# Patient Record
Sex: FEMALE | Race: ASIAN | NOT HISPANIC OR LATINO
[De-identification: names, ages, dates, MRNs, and addresses within clinical notes are randomized per-mention and may not be internally consistent; named-entity substitution may affect disease eponyms.]

---

## 2017-02-15 ENCOUNTER — RESULT REVIEW (OUTPATIENT)
Age: 72
End: 2017-02-15

## 2018-12-26 ENCOUNTER — APPOINTMENT (OUTPATIENT)
Dept: PULMONOLOGY | Facility: CLINIC | Age: 73
End: 2018-12-26
Payer: MEDICARE

## 2018-12-26 VITALS
OXYGEN SATURATION: 99 % | HEIGHT: 59 IN | TEMPERATURE: 98 F | RESPIRATION RATE: 20 BRPM | WEIGHT: 252 LBS | BODY MASS INDEX: 50.8 KG/M2 | HEART RATE: 111 BPM | DIASTOLIC BLOOD PRESSURE: 82 MMHG | SYSTOLIC BLOOD PRESSURE: 164 MMHG

## 2018-12-26 VITALS — WEIGHT: 250 LBS | BODY MASS INDEX: 50.49 KG/M2

## 2018-12-26 DIAGNOSIS — E66.2 MORBID (SEVERE) OBESITY WITH ALVEOLAR HYPOVENTILATION: ICD-10-CM

## 2018-12-26 DIAGNOSIS — M06.30 RHEUMATOID NODULE, UNSPECIFIED SITE: ICD-10-CM

## 2018-12-26 PROCEDURE — ZZZZZ: CPT

## 2018-12-26 PROCEDURE — 94729 DIFFUSING CAPACITY: CPT

## 2018-12-26 PROCEDURE — 94726 PLETHYSMOGRAPHY LUNG VOLUMES: CPT

## 2018-12-26 PROCEDURE — 94010 BREATHING CAPACITY TEST: CPT

## 2018-12-26 PROCEDURE — 99214 OFFICE O/P EST MOD 30 MIN: CPT | Mod: 25

## 2018-12-27 ENCOUNTER — OTHER (OUTPATIENT)
Age: 73
End: 2018-12-27

## 2018-12-27 DIAGNOSIS — G47.19 OTHER HYPERSOMNIA: ICD-10-CM

## 2019-01-25 ENCOUNTER — APPOINTMENT (OUTPATIENT)
Dept: SLEEP CENTER | Facility: CLINIC | Age: 74
End: 2019-01-25
Payer: MEDICARE

## 2019-01-25 PROCEDURE — G0399: CPT | Mod: 26

## 2019-01-28 ENCOUNTER — OUTPATIENT (OUTPATIENT)
Dept: OUTPATIENT SERVICES | Facility: HOSPITAL | Age: 74
LOS: 1 days | End: 2019-01-28
Payer: MEDICARE

## 2019-01-28 PROCEDURE — G0399: CPT

## 2019-02-01 DIAGNOSIS — G47.33 OBSTRUCTIVE SLEEP APNEA (ADULT) (PEDIATRIC): ICD-10-CM

## 2019-03-15 ENCOUNTER — APPOINTMENT (OUTPATIENT)
Dept: PULMONOLOGY | Facility: CLINIC | Age: 74
End: 2019-03-15
Payer: MEDICARE

## 2019-03-15 VITALS
HEIGHT: 59 IN | HEART RATE: 95 BPM | TEMPERATURE: 98 F | SYSTOLIC BLOOD PRESSURE: 160 MMHG | DIASTOLIC BLOOD PRESSURE: 81 MMHG | BODY MASS INDEX: 50.4 KG/M2 | OXYGEN SATURATION: 95 % | WEIGHT: 250 LBS | RESPIRATION RATE: 16 BRPM

## 2019-03-15 DIAGNOSIS — J47.9 BRONCHIECTASIS, UNCOMPLICATED: ICD-10-CM

## 2019-03-15 DIAGNOSIS — I50.30 UNSPECIFIED DIASTOLIC (CONGESTIVE) HEART FAILURE: ICD-10-CM

## 2019-03-15 DIAGNOSIS — Z86.79 PERSONAL HISTORY OF OTHER DISEASES OF THE CIRCULATORY SYSTEM: ICD-10-CM

## 2019-03-15 DIAGNOSIS — Z86.39 PERSONAL HISTORY OF OTHER ENDOCRINE, NUTRITIONAL AND METABOLIC DISEASE: ICD-10-CM

## 2019-03-15 DIAGNOSIS — M06.9 RHEUMATOID ARTHRITIS, UNSPECIFIED: ICD-10-CM

## 2019-03-15 PROCEDURE — 99215 OFFICE O/P EST HI 40 MIN: CPT

## 2019-03-15 RX ORDER — HYDRALAZINE HYDROCHLORIDE 50 MG/1
TABLET ORAL
Refills: 0 | Status: ACTIVE | COMMUNITY

## 2019-03-15 RX ORDER — BENAZEPRIL HYDROCHLORIDE 10 MG/1
10 TABLET ORAL
Refills: 0 | Status: ACTIVE | COMMUNITY

## 2019-03-15 NOTE — ASSESSMENT
[FreeTextEntry1] : Based on patient examination and her chest x-ray findings, it seems likely that she has diastolic heart failure. My plan is to add Aldactone to her regimen. I asked her to weigh herself daily and she will call me in 3 or 4 days to be certain that she tolerates the Aldactone. She did not have any significant effect, I would like we added metolazone to her regimen while monitoring her kidney function. I discussed this with her in detail. I reviewed the echo report from her cardiologist's office.

## 2019-03-15 NOTE — REVIEW OF SYSTEMS
[Fatigue] : fatigue [Cough] : cough [Dyspnea] : dyspnea [As Noted in HPI] : as noted in HPI [Hypertension] : ~T hypertension [Arthralgias] : arthralgias [Diabetes] : diabetes mellitus [Negative] : Neurologic

## 2019-03-15 NOTE — PHYSICAL EXAM
[General Appearance - In No Acute Distress] : no acute distress [Normal Conjunctiva] : the conjunctiva exhibited no abnormalities [Neck Appearance] : the appearance of the neck was normal [Heart Rate And Rhythm] : heart rate and rhythm were normal [Heart Sounds] : normal S1 and S2 [] : no respiratory distress [Respiration, Rhythm And Depth] : normal respiratory rhythm and effort [FreeTextEntry1] : wheelchair [Nail Clubbing] : no clubbing of the fingernails [Cyanosis, Localized] : no localized cyanosis [FreeTextEntry2] : +4 [No Focal Deficits] : no focal deficits [Affect] : the affect was normal [Mood] : the mood was normal

## 2019-03-15 NOTE — HISTORY OF PRESENT ILLNESS
[FreeTextEntry1] : 73-year-old female with increasing dyspnea including paroxysmal nocturnal dyspnea. The patient is complaining of difficulty breathing while sleeping with choking episodes. Her sleep study failed to disclose significant apnea. However, a recent chest radiograph demonstrated bilateral pleural effusions, perihilar infiltrates and cardiomegaly consistent with congestive heart failure. Her cardiologist started her on Cipro. The patient currently is on several antihypertensives and 80 mg of Lasix a day. She admits to a nonproductive cough and fatigue as well as dyspnea but denies any chest pain or fever

## 2019-03-15 NOTE — REASON FOR VISIT
[Follow-Up] : a follow-up visit [Shortness of Breath] : shortness of Breath [Family Member] : family member

## 2019-04-15 ENCOUNTER — RX RENEWAL (OUTPATIENT)
Age: 74
End: 2019-04-15

## 2019-04-15 RX ORDER — SPIRONOLACTONE 25 MG/1
25 TABLET ORAL
Qty: 60 | Refills: 0 | Status: ACTIVE | COMMUNITY
Start: 2019-03-15 | End: 1900-01-01

## 2020-04-04 ENCOUNTER — INPATIENT (INPATIENT)
Facility: HOSPITAL | Age: 75
LOS: 19 days | Discharge: HOSPICE | End: 2020-04-24
Attending: INTERNAL MEDICINE | Admitting: INTERNAL MEDICINE
Payer: MEDICARE

## 2020-04-04 VITALS
HEART RATE: 94 BPM | RESPIRATION RATE: 20 BRPM | TEMPERATURE: 99 F | OXYGEN SATURATION: 99 % | SYSTOLIC BLOOD PRESSURE: 99 MMHG | DIASTOLIC BLOOD PRESSURE: 40 MMHG

## 2020-04-04 DIAGNOSIS — E87.5 HYPERKALEMIA: ICD-10-CM

## 2020-04-04 LAB
ALBUMIN SERPL ELPH-MCNC: 2.3 G/DL — LOW (ref 3.3–5)
ALBUMIN SERPL ELPH-MCNC: 2.4 G/DL — LOW (ref 3.3–5)
ALP SERPL-CCNC: 187 U/L — HIGH (ref 40–120)
ALP SERPL-CCNC: 201 U/L — HIGH (ref 40–120)
ALT FLD-CCNC: 27 U/L — SIGNIFICANT CHANGE UP (ref 4–33)
ALT FLD-CCNC: 28 U/L — SIGNIFICANT CHANGE UP (ref 4–33)
ANION GAP SERPL CALC-SCNC: 14 MMO/L — SIGNIFICANT CHANGE UP (ref 7–14)
ANION GAP SERPL CALC-SCNC: 15 MMO/L — HIGH (ref 7–14)
ANION GAP SERPL CALC-SCNC: 17 MMO/L — HIGH (ref 7–14)
ANISOCYTOSIS BLD QL: SLIGHT — SIGNIFICANT CHANGE UP
APPEARANCE UR: CLEAR — SIGNIFICANT CHANGE UP
APTT BLD: 24.2 SEC — LOW (ref 27.5–36.3)
AST SERPL-CCNC: 20 U/L — SIGNIFICANT CHANGE UP (ref 4–32)
AST SERPL-CCNC: 22 U/L — SIGNIFICANT CHANGE UP (ref 4–32)
BACTERIA # UR AUTO: HIGH
BASOPHILS # BLD AUTO: 0.04 K/UL — SIGNIFICANT CHANGE UP (ref 0–0.2)
BASOPHILS NFR BLD AUTO: 0.2 % — SIGNIFICANT CHANGE UP (ref 0–2)
BASOPHILS NFR SPEC: 0 % — SIGNIFICANT CHANGE UP (ref 0–2)
BILIRUB SERPL-MCNC: 0.3 MG/DL — SIGNIFICANT CHANGE UP (ref 0.2–1.2)
BILIRUB SERPL-MCNC: 0.4 MG/DL — SIGNIFICANT CHANGE UP (ref 0.2–1.2)
BILIRUB UR-MCNC: NEGATIVE — SIGNIFICANT CHANGE UP
BLASTS # FLD: 0 % — SIGNIFICANT CHANGE UP (ref 0–0)
BLOOD UR QL VISUAL: NEGATIVE — SIGNIFICANT CHANGE UP
BUN SERPL-MCNC: 107 MG/DL — HIGH (ref 7–23)
BUN SERPL-MCNC: 110 MG/DL — HIGH (ref 7–23)
BUN SERPL-MCNC: 110 MG/DL — HIGH (ref 7–23)
CALCIUM SERPL-MCNC: 9.2 MG/DL — SIGNIFICANT CHANGE UP (ref 8.4–10.5)
CALCIUM SERPL-MCNC: 9.4 MG/DL — SIGNIFICANT CHANGE UP (ref 8.4–10.5)
CALCIUM SERPL-MCNC: 9.5 MG/DL — SIGNIFICANT CHANGE UP (ref 8.4–10.5)
CHLORIDE SERPL-SCNC: 100 MMOL/L — SIGNIFICANT CHANGE UP (ref 98–107)
CHLORIDE SERPL-SCNC: 100 MMOL/L — SIGNIFICANT CHANGE UP (ref 98–107)
CHLORIDE SERPL-SCNC: 101 MMOL/L — SIGNIFICANT CHANGE UP (ref 98–107)
CO2 SERPL-SCNC: 19 MMOL/L — LOW (ref 22–31)
CO2 SERPL-SCNC: 19 MMOL/L — LOW (ref 22–31)
CO2 SERPL-SCNC: 20 MMOL/L — LOW (ref 22–31)
COLOR SPEC: YELLOW — SIGNIFICANT CHANGE UP
CREAT SERPL-MCNC: 2.82 MG/DL — HIGH (ref 0.5–1.3)
CREAT SERPL-MCNC: 2.97 MG/DL — HIGH (ref 0.5–1.3)
CREAT SERPL-MCNC: 3.15 MG/DL — HIGH (ref 0.5–1.3)
EOSINOPHIL # BLD AUTO: 0.04 K/UL — SIGNIFICANT CHANGE UP (ref 0–0.5)
EOSINOPHIL NFR BLD AUTO: 0.2 % — SIGNIFICANT CHANGE UP (ref 0–6)
EOSINOPHIL NFR FLD: 0 % — SIGNIFICANT CHANGE UP (ref 0–6)
EPI CELLS # UR: SIGNIFICANT CHANGE UP
GLUCOSE SERPL-MCNC: 224 MG/DL — HIGH (ref 70–99)
GLUCOSE SERPL-MCNC: 300 MG/DL — HIGH (ref 70–99)
GLUCOSE SERPL-MCNC: 413 MG/DL — HIGH (ref 70–99)
GLUCOSE UR-MCNC: NEGATIVE — SIGNIFICANT CHANGE UP
HCT VFR BLD CALC: 27.1 % — LOW (ref 34.5–45)
HCT VFR BLD CALC: 27.9 % — LOW (ref 34.5–45)
HGB BLD-MCNC: 7.9 G/DL — LOW (ref 11.5–15.5)
HGB BLD-MCNC: 8 G/DL — LOW (ref 11.5–15.5)
HYPOCHROMIA BLD QL: SIGNIFICANT CHANGE UP
IMM GRANULOCYTES NFR BLD AUTO: 7.3 % — HIGH (ref 0–1.5)
INR BLD: 1.24 — HIGH (ref 0.88–1.17)
KETONES UR-MCNC: NEGATIVE — SIGNIFICANT CHANGE UP
LEUKOCYTE ESTERASE UR-ACNC: SIGNIFICANT CHANGE UP
LYMPHOCYTES # BLD AUTO: 0.2 K/UL — LOW (ref 1–3.3)
LYMPHOCYTES # BLD AUTO: 1.2 % — LOW (ref 13–44)
LYMPHOCYTES NFR SPEC AUTO: 0.9 % — LOW (ref 13–44)
MACROCYTES BLD QL: SLIGHT — SIGNIFICANT CHANGE UP
MANUAL SMEAR VERIFICATION: SIGNIFICANT CHANGE UP
MCHC RBC-ENTMCNC: 27.6 PG — SIGNIFICANT CHANGE UP (ref 27–34)
MCHC RBC-ENTMCNC: 28 PG — SIGNIFICANT CHANGE UP (ref 27–34)
MCHC RBC-ENTMCNC: 28.7 % — LOW (ref 32–36)
MCHC RBC-ENTMCNC: 29.2 % — LOW (ref 32–36)
MCV RBC AUTO: 94.8 FL — SIGNIFICANT CHANGE UP (ref 80–100)
MCV RBC AUTO: 97.6 FL — SIGNIFICANT CHANGE UP (ref 80–100)
METAMYELOCYTES # FLD: 1.7 % — HIGH (ref 0–1)
MONOCYTES # BLD AUTO: 0.51 K/UL — SIGNIFICANT CHANGE UP (ref 0–0.9)
MONOCYTES NFR BLD AUTO: 3.1 % — SIGNIFICANT CHANGE UP (ref 2–14)
MONOCYTES NFR BLD: 4.4 % — SIGNIFICANT CHANGE UP (ref 2–9)
MYELOCYTES NFR BLD: 0.9 % — HIGH (ref 0–0)
NEUTROPHIL AB SER-ACNC: 87.8 % — HIGH (ref 43–77)
NEUTROPHILS # BLD AUTO: 14.61 K/UL — HIGH (ref 1.8–7.4)
NEUTROPHILS NFR BLD AUTO: 88 % — HIGH (ref 43–77)
NEUTS BAND # BLD: 4.3 % — SIGNIFICANT CHANGE UP (ref 0–6)
NITRITE UR-MCNC: NEGATIVE — SIGNIFICANT CHANGE UP
NRBC # FLD: 0.03 K/UL — SIGNIFICANT CHANGE UP (ref 0–0)
NRBC # FLD: 0.06 K/UL — SIGNIFICANT CHANGE UP (ref 0–0)
NT-PROBNP SERPL-SCNC: 2753 PG/ML — SIGNIFICANT CHANGE UP
OTHER - HEMATOLOGY %: 0 — SIGNIFICANT CHANGE UP
PH UR: 6 — SIGNIFICANT CHANGE UP (ref 5–8)
PLATELET # BLD AUTO: 142 K/UL — LOW (ref 150–400)
PLATELET # BLD AUTO: 165 K/UL — SIGNIFICANT CHANGE UP (ref 150–400)
PLATELET COUNT - ESTIMATE: NORMAL — SIGNIFICANT CHANGE UP
PMV BLD: 12.5 FL — SIGNIFICANT CHANGE UP (ref 7–13)
PMV BLD: 12.7 FL — SIGNIFICANT CHANGE UP (ref 7–13)
POLYCHROMASIA BLD QL SMEAR: SLIGHT — SIGNIFICANT CHANGE UP
POTASSIUM SERPL-MCNC: 7.2 MMOL/L — CRITICAL HIGH (ref 3.5–5.3)
POTASSIUM SERPL-MCNC: 7.5 MMOL/L — CRITICAL HIGH (ref 3.5–5.3)
POTASSIUM SERPL-MCNC: 7.5 MMOL/L — CRITICAL HIGH (ref 3.5–5.3)
POTASSIUM SERPL-SCNC: 7.2 MMOL/L — CRITICAL HIGH (ref 3.5–5.3)
POTASSIUM SERPL-SCNC: 7.5 MMOL/L — CRITICAL HIGH (ref 3.5–5.3)
POTASSIUM SERPL-SCNC: 7.5 MMOL/L — CRITICAL HIGH (ref 3.5–5.3)
PROCALCITONIN SERPL-MCNC: 4.05 NG/ML — HIGH (ref 0.02–0.1)
PROCALCITONIN SERPL-MCNC: 4.82 NG/ML — HIGH (ref 0.02–0.1)
PROMYELOCYTES # FLD: 0 % — SIGNIFICANT CHANGE UP (ref 0–0)
PROT SERPL-MCNC: 6.5 G/DL — SIGNIFICANT CHANGE UP (ref 6–8.3)
PROT SERPL-MCNC: 6.9 G/DL — SIGNIFICANT CHANGE UP (ref 6–8.3)
PROT UR-MCNC: SIGNIFICANT CHANGE UP
PROTHROM AB SERPL-ACNC: 14.3 SEC — HIGH (ref 9.8–13.1)
RBC # BLD: 2.86 M/UL — LOW (ref 3.8–5.2)
RBC # BLD: 2.86 M/UL — LOW (ref 3.8–5.2)
RBC # FLD: 15.9 % — HIGH (ref 10.3–14.5)
RBC # FLD: 15.9 % — HIGH (ref 10.3–14.5)
RBC CASTS # UR COMP ASSIST: SIGNIFICANT CHANGE UP (ref 0–?)
REVIEW TO FOLLOW: YES — SIGNIFICANT CHANGE UP
SODIUM SERPL-SCNC: 134 MMOL/L — LOW (ref 135–145)
SODIUM SERPL-SCNC: 135 MMOL/L — SIGNIFICANT CHANGE UP (ref 135–145)
SODIUM SERPL-SCNC: 136 MMOL/L — SIGNIFICANT CHANGE UP (ref 135–145)
SP GR SPEC: 1.02 — SIGNIFICANT CHANGE UP (ref 1–1.04)
UROBILINOGEN FLD QL: 0.2 — SIGNIFICANT CHANGE UP
VARIANT LYMPHS # BLD: 0 % — SIGNIFICANT CHANGE UP
WBC # BLD: 15.61 K/UL — HIGH (ref 3.8–10.5)
WBC # BLD: 16.61 K/UL — HIGH (ref 3.8–10.5)
WBC # FLD AUTO: 15.61 K/UL — HIGH (ref 3.8–10.5)
WBC # FLD AUTO: 16.61 K/UL — HIGH (ref 3.8–10.5)
WBC UR QL: SIGNIFICANT CHANGE UP (ref 0–?)

## 2020-04-04 PROCEDURE — 71045 X-RAY EXAM CHEST 1 VIEW: CPT | Mod: 26

## 2020-04-04 RX ORDER — INSULIN HUMAN 100 [IU]/ML
3 INJECTION, SOLUTION SUBCUTANEOUS ONCE
Refills: 0 | Status: COMPLETED | OUTPATIENT
Start: 2020-04-04 | End: 2020-04-04

## 2020-04-04 RX ORDER — SODIUM ZIRCONIUM CYCLOSILICATE 10 G/10G
10 POWDER, FOR SUSPENSION ORAL ONCE
Refills: 0 | Status: COMPLETED | OUTPATIENT
Start: 2020-04-04 | End: 2020-04-05

## 2020-04-04 RX ORDER — DEXTROSE 50 % IN WATER 50 %
50 SYRINGE (ML) INTRAVENOUS ONCE
Refills: 0 | Status: COMPLETED | OUTPATIENT
Start: 2020-04-04 | End: 2020-04-04

## 2020-04-04 RX ORDER — SODIUM BICARBONATE 1 MEQ/ML
50 SYRINGE (ML) INTRAVENOUS ONCE
Refills: 0 | Status: COMPLETED | OUTPATIENT
Start: 2020-04-04 | End: 2020-04-04

## 2020-04-04 RX ORDER — ACETAMINOPHEN 500 MG
650 TABLET ORAL ONCE
Refills: 0 | Status: COMPLETED | OUTPATIENT
Start: 2020-04-04 | End: 2020-04-04

## 2020-04-04 RX ORDER — CALCIUM GLUCONATE 100 MG/ML
1 VIAL (ML) INTRAVENOUS ONCE
Refills: 0 | Status: COMPLETED | OUTPATIENT
Start: 2020-04-04 | End: 2020-04-04

## 2020-04-04 RX ORDER — CALCIUM GLUCONATE 100 MG/ML
2 VIAL (ML) INTRAVENOUS ONCE
Refills: 0 | Status: COMPLETED | OUTPATIENT
Start: 2020-04-04 | End: 2020-04-04

## 2020-04-04 RX ORDER — INSULIN HUMAN 100 [IU]/ML
7 INJECTION, SOLUTION SUBCUTANEOUS ONCE
Refills: 0 | Status: COMPLETED | OUTPATIENT
Start: 2020-04-04 | End: 2020-04-04

## 2020-04-04 RX ORDER — FUROSEMIDE 40 MG
40 TABLET ORAL ONCE
Refills: 0 | Status: COMPLETED | OUTPATIENT
Start: 2020-04-04 | End: 2020-04-04

## 2020-04-04 RX ORDER — SODIUM ZIRCONIUM CYCLOSILICATE 10 G/10G
10 POWDER, FOR SUSPENSION ORAL ONCE
Refills: 0 | Status: DISCONTINUED | OUTPATIENT
Start: 2020-04-04 | End: 2020-04-04

## 2020-04-04 RX ORDER — SODIUM ZIRCONIUM CYCLOSILICATE 10 G/10G
10 POWDER, FOR SUSPENSION ORAL THREE TIMES A DAY
Refills: 0 | Status: DISCONTINUED | OUTPATIENT
Start: 2020-04-04 | End: 2020-04-04

## 2020-04-04 RX ORDER — BUMETANIDE 0.25 MG/ML
4 INJECTION INTRAMUSCULAR; INTRAVENOUS ONCE
Refills: 0 | Status: COMPLETED | OUTPATIENT
Start: 2020-04-04 | End: 2020-04-04

## 2020-04-04 RX ADMIN — Medication 50 MILLILITER(S): at 20:22

## 2020-04-04 RX ADMIN — Medication 40 MILLIGRAM(S): at 13:50

## 2020-04-04 RX ADMIN — Medication 50 MILLIEQUIVALENT(S): at 22:52

## 2020-04-04 RX ADMIN — Medication 100 GRAM(S): at 20:22

## 2020-04-04 RX ADMIN — INSULIN HUMAN 7 UNIT(S): 100 INJECTION, SOLUTION SUBCUTANEOUS at 20:22

## 2020-04-04 RX ADMIN — Medication 650 MILLIGRAM(S): at 22:52

## 2020-04-04 NOTE — ED PROVIDER NOTE - ATTENDING CONTRIBUTION TO CARE
74 year old female presenting with gross fluid overload. LE weeping edema. will check lytes and admit for iv diuresis.

## 2020-04-04 NOTE — H&P ADULT - NSICDXPASTMEDICALHX_GEN_ALL_CORE_FT
PAST MEDICAL HISTORY:  Bronchiectasis     Hyperlipidemia     Hypertension     Rheumatoid arthritis     Type 2 diabetes mellitus

## 2020-04-04 NOTE — H&P ADULT - NSHPLABSRESULTS_GEN_ALL_CORE
The Labs were reviewed by me   The Radiology was reviewed by me    EKG tracing reviewed by me        134<L>  |  100  |  107<H>  ----------------------------<  413<H>  7.2<HH>   |  20<L>  |  3.15<H>      135  |  101  |  110<H>  ----------------------------<  300<H>  7.5<HH>   |  19<L>  |  2.97<H>      136  |  100  |  110<H>  ----------------------------<  224<H>  7.5<HH>   |  19<L>  |  2.82<H>    Ca    9.4      2020 21:00  Ca    9.2      2020 13:50  Ca    9.5      2020 13:00    TPro  6.5  /  Alb  2.3<L>  /  TBili  0.3  /  DBili  x   /  AST  20  /  ALT  27  /  AlkPhos  187<H>    TPro  6.9  /  Alb  2.4<L>  /  TBili  0.4  /  DBili  x   /  AST  22  /  ALT  28  /  AlkPhos  201<H>            PT/INR - ( 2020 13:00 )   PT: 14.3 SEC;   INR: 1.24          PTT - ( 2020 13:00 )  PTT:24.2 SEC              Urinalysis Basic - ( 2020 21:00 )    Color: YELLOW / Appearance: CLEAR / S.019 / pH: 6.0  Gluc: NEGATIVE / Ketone: NEGATIVE  / Bili: NEGATIVE / Urobili: 0.2   Blood: NEGATIVE / Protein: MODERATE / Nitrite: NEGATIVE   Leuk Esterase: SMALL / RBC: NONE / WBC 2-5   Sq Epi: x / Non Sq Epi: FEW / Bacteria: MODERATE                              8.0    15.61 )-----------( 142      ( 2020 21:00 )             27.9                         7.9    16.61 )-----------( 165      ( 2020 13:00 )             27.1     CAPILLARY BLOOD GLUCOSE      POCT Blood Glucose.: 416 mg/dL (2020 22:39)  POCT Blood Glucose.: 375 mg/dL (2020 20:02)  POCT Blood Glucose.: 220 mg/dL (2020 12:10) The Labs were reviewed by me   The Radiology was reviewed by me    EKG tracing reviewed by me        134<L>  |  100  |  107<H>  ----------------------------<  413<H>  7.2<HH>   |  20<L>  |  3.15<H>      135  |  101  |  110<H>  ----------------------------<  300<H>  7.5<HH>   |  19<L>  |  2.97<H>      136  |  100  |  110<H>  ----------------------------<  224<H>  7.5<HH>   |  19<L>  |  2.82<H>    Ca    9.4      2020 21:00  Ca    9.2      2020 13:50  Ca    9.5      2020 13:00    TPro  6.5  /  Alb  2.3<L>  /  TBili  0.3  /  DBili  x   /  AST  20  /  ALT  27  /  AlkPhos  187<H>    TPro  6.9  /  Alb  2.4<L>  /  TBili  0.4  /  DBili  x   /  AST  22  /  ALT  28  /  AlkPhos  201<H>            PT/INR - ( 2020 13:00 )   PT: 14.3 SEC;   INR: 1.24          PTT - ( 2020 13:00 )  PTT:24.2 SEC              Urinalysis Basic - ( 2020 21:00 )    Color: YELLOW / Appearance: CLEAR / S.019 / pH: 6.0  Gluc: NEGATIVE / Ketone: NEGATIVE  / Bili: NEGATIVE / Urobili: 0.2   Blood: NEGATIVE / Protein: MODERATE / Nitrite: NEGATIVE   Leuk Esterase: SMALL / RBC: NONE / WBC 2-5   Sq Epi: x / Non Sq Epi: FEW / Bacteria: MODERATE                              8.0    15.61 )-----------( 142      ( 2020 21:00 )             27.9                         7.9    16.61 )-----------( 165      ( 2020 13:00 )             27.1     CAPILLARY BLOOD GLUCOSE      POCT Blood Glucose.: 416 mg/dL (2020 22:39)  POCT Blood Glucose.: 375 mg/dL (2020 20:02)  POCT Blood Glucose.: 220 mg/dL (2020 12:10)    < from: Xray Chest 1 View-PORTABLE IMMEDIATE (20 @ 14:38) >      IMPRESSION:  Redemonstratedblunted bilateral CP angles compatible with persistent small pleural reactions. Grossly clear remaining visualized lungs. No pneumothorax.    Stable slightly prominent appearing cardiac and mediastinal silhouettes.    Trachea midline.    Stable osseous structures.    < end of copied text >

## 2020-04-04 NOTE — H&P ADULT - PROBLEM SELECTOR PLAN 4
PMH insulin dependent T2DM; home medications include tresiba 40 U at bedtime, Novolog sliding scale, amaryl 2 mg BID  -c/w lantus 15 U (about 50% given acute renal failure)   -FS q6h while NPO  -Hyperglycemic to 400s given recent D50 adminsitration and renal failure Meets SIRS criteria (WBC 16.61, HR 93-99, RR 20-28) unclear source   -CXR clear, UA w/ small leuk esterase  -monitor off abx  -f/u BCX, UCX   -VS q4h Meets SIRS criteria (WBC 16.61, HR 93-99, RR 20-28) unclear source   - pro-marlin elevated to 4, CXR clear, UA w/ small leuk esterase  -monitor off abx for now, if spikes fever, low threshold to start broad spectrum abx  -f/u BCX, UCX, covid 19 PCR  -VS q4h

## 2020-04-04 NOTE — ED ADULT NURSE NOTE - OBJECTIVE STATEMENT
patient brought to room 24 awake and alert, bilateral legs noted swollen with weeping edema, bed sheets soaked. The top of patient s right foot noted to have large popped blister. Patient states she has not walked in days and is helped by her home health aide. Patient states that she uses home o2.

## 2020-04-04 NOTE — H&P ADULT - PROBLEM SELECTOR PLAN 3
Per [employer] notes, patient w/ diastolic HF   -P/w LE edema, orthopnea likely 2/2 acute on chronic diastolic HF  -S/p lasix 40 IVP x1; hold off on further diuresis for now given acute renal failure  -f/u TTE Per [employer] notes, patient w/ diastolic HF   -P/w LE edema, orthopnea likely 2/2 acute on chronic diastolic HF  -S/p lasix 40 IVP x1; bumex 4 IVx1  -monitor I&Os  -f/u TTE

## 2020-04-04 NOTE — ED PROVIDER NOTE - CLINICAL SUMMARY MEDICAL DECISION MAKING FREE TEXT BOX
73 yo F with COPD on 4L O2 at home with O2 sat around 97%, CHF, HLD, DM, HTN ,RA presents with complaints, of leg swelling/ edema worse x 1 week, and sob. pt notes chills this am, otherwise no reports of fever. Pt found by EMS hypoglycemic  FS 50, given glucose  in triage..  chf/ fluid overload vs pna vs copd exacerbation vs covid.   no infectious appearance of LE.  IV diuresis. labs, cxr, admit.

## 2020-04-04 NOTE — ED PROVIDER NOTE - OBJECTIVE STATEMENT
73 yo F with COPD on 4L O2 at home with O2 sat around 97%, CHF, HLD, DM, HTN ,RA presents with complaints, of leg swelling/ edema worse x 1 week, and sob. pt notes chills this am, otherwise no reports of fever. Pt found by EMS hypoglycemic  FS 50, given glucose  in triage. Pt notes took her meds without eating today. Denies cough, fever, sore throat, URI symptoms, abdominal pain, nausea, vomiting, diarrhea, dysuria, hematuria.   Pt notes that due to weight its hard for her to get around, especially to go to the bathroom so her "water Pills" were changed.

## 2020-04-04 NOTE — H&P ADULT - PROBLEM SELECTOR PLAN 9
IMPROVE score:   Diet: NPO except meds given current condition IMPROVE score: HSQ q12h  Diet: NPO except meds given current condition    #GOC  -attempted to have GOC conversation regarding code status but patient was overwhelmed and refused to participate in conversation. She states that she did not want to think about it.   Remains FULL CODE at this time

## 2020-04-04 NOTE — H&P ADULT - ASSESSMENT
Patient is a 74 y.o F w/ PMH HLD, HTN, insulin dependent T2DM, RA (on chronic prednisone and leflunomide) c/b bronchiectasis (on home O2 4 L NC) who is admitted with acute renal failure c/b hyperkalemia and acute on chronic diastolic heart failure

## 2020-04-04 NOTE — H&P ADULT - PROBLEM SELECTOR PLAN 1
P/w acute renal failure creatinine 3.15 (baseline 1.02 from 3/25) c/b hyperkalemia and oliguria of unclear etiology   -Nephrology consulted; appreciate recs  -f/u repeat BMP; if k+ doesn't decrease, will likely need urgent HD   -Strict I&Os  -avoid nephrotoxic agents P/w acute renal failure creatinine 3.15 (baseline 1.02 from 3/25) c/b hyperkalemia and oliguria of unclear etiology   -Nephrology consulted; appreciate recs  -f/u repeat BMP; if k+ doesn't decrease or becomes anuric, will likely need urgent HD   -Strict I&Os  -avoid nephrotoxic agents  -f/u renal U/S  -f/u U Na, Ucreatinine, Uprotein/creatinine, C3, C4, hepBsAg, hepatitis C antibody

## 2020-04-04 NOTE — H&P ADULT - HISTORY OF PRESENT ILLNESS
Patient is a 74 y.o F w/ PMH HLD, HTN, insulin dependent T2DM, RA (on chronic prednisone and leflunomide) c/b bronchiectasis (on home O2 4 L NC) who p/w worsening LE swelling. She states that about two weeks ago, she started experiencing weakness of her LE 2/2 her RA. Her prednisone dose was increased to 20 mg but she started to notice worsening LE swelling and SOB. She admits to orthopnea. She called her PMD this week who started her on lasix 20 daily w/ some improvement in her LE swelling but she continued to have SOB. Her PMD advised her to go to the ED. She denies any CP, fever/chills, cough, sick contacts, or recent travel.     In the ED, vitals Tmax 98.8 HR 93-99 BP 99/40 -134/97 RR 20-28 SPO2 % on 4-5 L NC. Notable labs: WBC 16.61, k+ 7.5, creatinine 2.82. s/p Patient is a 74 y.o F w/ PMH HLD, HTN, insulin dependent T2DM, RA (on chronic prednisone and leflunomide) c/b bronchiectasis (on home O2 4 L NC) who p/w worsening LE swelling. She states that about two weeks ago, she started experiencing weakness of her LE 2/2 her RA. Her prednisone dose was increased to 20 mg but she started to notice worsening LE swelling and SOB. She admits to orthopnea. She called her PMD this week who started her on lasix 20 daily w/ some improvement in her LE swelling but she continued to have SOB. She also admit to one watery BM a day which is unusual for her. Her PMD advised her to go to the ED. She denies any CP, palpitations, fever/chills, cough, sick contacts, or recent travel.     In the ED, vitals Tmax 98.8 HR 93-99 BP 99/40 -134/97 RR 20-28 SPO2 % on 4-5 L NC. Notable labs: WBC 16.61, k+ 7.5, creatinine 2.82. s/p Patient is a 74 y.o F w/ PMH HLD, HTN, insulin dependent T2DM, RA (on chronic prednisone, leflunomide, and hydroxychloroquine) c/b bronchiectasis (on home O2 4 L NC) who p/w worsening LE swelling. She states that about two weeks ago, she started experiencing weakness of her LE 2/2 her RA. Her prednisone dose was increased to 20 mg but she started to notice worsening LE swelling and SOB. She admits to orthopnea. She called her PMD this week who started her on lasix 20 daily w/ some improvement in her LE swelling but she continued to have SOB. She also admit to one watery BM a day which is unusual for her. Her PMD advised her to go to the ED. She denies any CP, palpitations, fever/chills, cough, sick contacts, or recent travel.     In the ED, vitals Tmax 98.8 HR 93-99 BP 99/40 -134/97 RR 20-28 SPO2 % on 4-5 L NC. Notable labs: WBC 16.61, k+ 7.5, creatinine 2.82. s/p

## 2020-04-04 NOTE — CHART NOTE - NSCHARTNOTEFT_GEN_A_CORE
Called by ER team about pt. with renal failure and hyperkalemia. Pt. being ruled out for COVID-19. Pt. found to have elevated Scr and serum potassium of 7.2. Pt. volume overloaded per ER team. Cevallos catheter inserted. Medical management of hyperkalemia attempted with insulin/D50, Lasix, and Lokelma. Repeat serum potassium mildly improved to 7.2. Pt. remains hyperglycemic. Also noted to be hypoalbuminemic and with minimal UOP (200 cc) since administration of Lasix.    Recommend to give Bumex 4 mg IV x1, 1 amp of sodium bicarbonate, and insulin. Monitor UOP closely. Repeat serum potassium measurement in 3-4 hours. Please call renal fellow with result. If serum potassium does not continue to downtrend, pt. may require urgent HD.    Plan reviewed with on-call nephrology attending, Dr. Wiggins.

## 2020-04-04 NOTE — H&P ADULT - NSHPPHYSICALEXAM_GEN_ALL_CORE
PHYSICAL EXAM:   · CONSTITUTIONAL: - - -  · Appearance: on 4L NC sat 100%, RR 22  · Manner: appropriate for situation  · Mentation: awake, alert, oriented to person, place, time/situation  · EYES: Clear bilaterally, pupils equal, round and reactive to light.  · CARDIAC: - - -  · CARDIAC RHYTHM: regular  · CARDIAC RATE: normal  · CARDIAC SOUNDS: S1-S2  · CARDIAC PEDAL EDEMA: PITTING 6 MM  · RESPIRATORY: - - -  · Respiratory Distress: no  · Breath Sounds: RALES  · Rales: RIGHT LOWER LOBE, LEFT LOWER LOBE  · GASTROINTESTINAL: Abdomen soft, non-tender, no guarding.  · SKIN: WOUNDS  · SKIN WOUND TYPE: boullus appearing, blister appearing, lesions on anterior lower leg b/l. and large blister, sloughing of skin on right foot, and posterior left leg.  · SKIN LOC 1: leg  · Laterality #1: bilateral

## 2020-04-04 NOTE — ED ADULT NURSE REASSESSMENT NOTE - NS ED NURSE REASSESS COMMENT FT1
Patient awake and alert, popped large blister noted on the top of her right foot. Patient with 3 liter nasal canula o2 in place, uses o2 at home. IV placed, labs sent., will continue to monitor.

## 2020-04-04 NOTE — H&P ADULT - PROBLEM SELECTOR PLAN 6
PMH HTN; home medications include hydralazine 50 TID, amlodipine 10 daily   -hold anti hypertensives in the setting PMH HTN; home medications include hydralazine 50 TID, amlodipine 10 daily   -hold anti hypertensives in the setting of possible sepsis   -VS q4h

## 2020-04-04 NOTE — ED PROVIDER NOTE - PROGRESS NOTE DETAILS
Discussed with renal and hospitalist. medications for hyperK to be given. Pt still making urine and does not need urgent dialysis at this time. Pt to be admitted to med on TELE and recheck BMP at midnight. If still elevated K will reevaluate options for dialysis   Get Urbano M.D. PGY-2

## 2020-04-04 NOTE — H&P ADULT - NSHPREVIEWOFSYSTEMS_GEN_ALL_CORE
CONSTITUTIONAL:  No weight loss, fever, chills, weakness or fatigue.  HEENT:  Eyes:  No visual loss, blurred vision, double vision or yellow sclerae. Ears, Nose, Throat:  No hearing loss, sneezing, congestion, runny nose or sore throat.  SKIN:  No rash or itching.  CARDIOVASCULAR:  No chest pain, chest pressure or chest discomfort. No palpitations or edema.  RESPIRATORY:  No shortness of breath, cough or sputum.  GASTROINTESTINAL:  No anorexia, nausea, vomiting or diarrhea. No abdominal pain or blood.  GENITOURINARY:  Denies hematuria, dysuria.   NEUROLOGICAL:  No headache, dizziness, syncope, paralysis, ataxia, numbness or tingling in the extremities. No change in bowel or bladder control.  MUSCULOSKELETAL:  No muscle, back pain, joint pain or stiffness.  HEMATOLOGIC:  No anemia, bleeding or bruising.  LYMPHATICS:  No enlarged nodes. No history of splenectomy.  PSYCHIATRIC:  No history of depression or anxiety.  ENDOCRINOLOGIC:  No reports of sweating, cold or heat intolerance. No polyuria or polydipsia.  ALLERGIES:  No history of asthma, hives, eczema or rhinitis. CONSTITUTIONAL:  +weakness, No weight loss, fever, chills  HEENT:  Eyes:  No visual loss, blurred vision, double vision or yellow sclerae. Ears, Nose, Throat:  No hearing loss, sneezing, congestion, runny nose or sore throat.  SKIN:  No rash or itching.  CARDIOVASCULAR:  No chest pain, chest pressure or chest discomfort. No palpitations or edema.  RESPIRATORY:  No shortness of breath, cough or sputum.  GASTROINTESTINAL:  No anorexia, nausea, vomiting or diarrhea. No abdominal pain or blood.  GENITOURINARY:  Denies hematuria, dysuria.   NEUROLOGICAL:  No headache, dizziness, syncope, paralysis, ataxia, numbness or tingling in the extremities. No change in bowel or bladder control.  MUSCULOSKELETAL:  No muscle, back pain, joint pain or stiffness.  HEMATOLOGIC:  No anemia, bleeding or bruising.  LYMPHATICS:  No enlarged nodes. No history of splenectomy.  PSYCHIATRIC:  No history of depression or anxiety.  ENDOCRINOLOGIC:  No reports of sweating, cold or heat intolerance. No polyuria or polydipsia.  ALLERGIES:  No history of asthma, hives, eczema or rhinitis.

## 2020-04-04 NOTE — ED ADULT TRIAGE NOTE - CHIEF COMPLAINT QUOTE
Pt BIB EMS for EMS for shortness of breath FS by EMS 50  oral glucose given Pt is O@ dependant at home   PMH: CHF DM HTN Pt BIB EMS for shortness of breath FS by EMS 50  oral glucose given Pt is O@ dependant at home   FS in triage 228  PMH: CHF DM HTN

## 2020-04-04 NOTE — H&P ADULT - PROBLEM SELECTOR PLAN 5
PMH HTN; home medications include hydralazine PMH insulin dependent T2DM; home medications include tresiba 40 U at bedtime, Novolog sliding scale, amaryl 2 mg BID  -c/w lantus 15 U (about 50% given acute renal failure)   -FS q6h while NPO  -Hyperglycemic to 400s given recent D50 adminsitration and renal failure PMH insulin dependent T2DM; home medications include tresiba 40 U at bedtime, Novolog sliding scale, amaryl 2 mg BID  -c/w lantus 15 U (about 50% given acute renal failure)   -FS q6h while NPO  -Hyperglycemic to 400s given recent D50 adminsitration and renal failure  -f/u A1c in AM

## 2020-04-04 NOTE — ED ADULT NURSE REASSESSMENT NOTE - NS ED NURSE REASSESS COMMENT FT1
Patient awake and alert, chow catheter placed as ordered, will monitor, patient appears comfortable at this time. NSR on cardiac monitor.

## 2020-04-04 NOTE — ED PROVIDER NOTE - SKIN WOUND TYPE
boullus appearing, blister appearing, lesions on anterior lower leg b/l. and large blister, sloughing of skin on right foot, and posterior left leg.

## 2020-04-04 NOTE — H&P ADULT - PROBLEM SELECTOR PLAN 8
PMH RA; home medications include prednisone 20 mg daily, leflunomide 20 daily, and hydroxychloroquine 200 BID  -c/w prednisone 20 daily  -Hold hydroxychloroquine and leflunamide in setting of renal impairment

## 2020-04-04 NOTE — H&P ADULT - PROBLEM SELECTOR PLAN 2
P/w k+ of 7.5 in setting of acute renal failure; -s/p lasix 40 IVP x1, R insulin 7 U x1; k+ decreased to 7.2  -EKG sinus but P waves flattened and appear prolonged, s/p calcium gluconate 1 g x1 and 2 g x1  -monitor on telemetry   -S/p bumex 4 IV x1, sodium bicarb 50 IV x1, lokelma 10 g x1; f/u repeat BMP   -If k+ does not improve will consider urgent HD P/w k+ of 7.5 in setting of acute renal failure; -s/p lasix 40 IVP x1, R insulin 7 U x1; k+ decreased to 7.2  -EKG sinus but P waves flattened and appear prolonged, s/p calcium gluconate 1 g x1 and 2 g x1  -monitor on telemetry   -S/p bumex 4 IV x1, sodium bicarb 50 IV x1, lokelma 10 g x1; f/u repeat BMP: ADDENDUM; repeat BMP 6.7. Will give another bumex 4 IV x1, lokelma 10 g x1, Rinsulin 10 U x1, calcium gluconate 1 g x1, f/u repeat BMP at noon   -If k+ does not improve will consider urgent HD

## 2020-04-04 NOTE — ED ADULT NURSE NOTE - CHIEF COMPLAINT QUOTE
Pt BIB EMS for shortness of breath FS by EMS 50  oral glucose given Pt is O@ dependant at home   FS in triage 228  PMH: CHF DM HTN

## 2020-04-05 DIAGNOSIS — Z29.9 ENCOUNTER FOR PROPHYLACTIC MEASURES, UNSPECIFIED: ICD-10-CM

## 2020-04-05 DIAGNOSIS — R65.10 SYSTEMIC INFLAMMATORY RESPONSE SYNDROME (SIRS) OF NON-INFECTIOUS ORIGIN WITHOUT ACUTE ORGAN DYSFUNCTION: ICD-10-CM

## 2020-04-05 DIAGNOSIS — E87.5 HYPERKALEMIA: ICD-10-CM

## 2020-04-05 DIAGNOSIS — I10 ESSENTIAL (PRIMARY) HYPERTENSION: ICD-10-CM

## 2020-04-05 DIAGNOSIS — N19 UNSPECIFIED KIDNEY FAILURE: ICD-10-CM

## 2020-04-05 DIAGNOSIS — E11.9 TYPE 2 DIABETES MELLITUS WITHOUT COMPLICATIONS: ICD-10-CM

## 2020-04-05 DIAGNOSIS — J47.9 BRONCHIECTASIS, UNCOMPLICATED: ICD-10-CM

## 2020-04-05 DIAGNOSIS — Z02.9 ENCOUNTER FOR ADMINISTRATIVE EXAMINATIONS, UNSPECIFIED: ICD-10-CM

## 2020-04-05 DIAGNOSIS — M06.9 RHEUMATOID ARTHRITIS, UNSPECIFIED: ICD-10-CM

## 2020-04-05 DIAGNOSIS — I50.33 ACUTE ON CHRONIC DIASTOLIC (CONGESTIVE) HEART FAILURE: ICD-10-CM

## 2020-04-05 DIAGNOSIS — N17.9 ACUTE KIDNEY FAILURE, UNSPECIFIED: ICD-10-CM

## 2020-04-05 LAB
ANION GAP SERPL CALC-SCNC: 17 MMO/L — HIGH (ref 7–14)
BASE EXCESS BLDV CALC-SCNC: -3.8 MMOL/L — SIGNIFICANT CHANGE UP
BLOOD GAS VENOUS - CREATININE: 3.12 MG/DL — HIGH (ref 0.5–1.3)
BLOOD GAS VENOUS - FIO2: 40 — SIGNIFICANT CHANGE UP
BUN SERPL-MCNC: 106 MG/DL — HIGH (ref 7–23)
CALCIUM SERPL-MCNC: 9.5 MG/DL — SIGNIFICANT CHANGE UP (ref 8.4–10.5)
CHLORIDE BLDV-SCNC: 104 MMOL/L — SIGNIFICANT CHANGE UP (ref 96–108)
CHLORIDE SERPL-SCNC: 96 MMOL/L — LOW (ref 98–107)
CO2 SERPL-SCNC: 18 MMOL/L — LOW (ref 22–31)
CREAT SERPL-MCNC: 3.05 MG/DL — HIGH (ref 0.5–1.3)
GAS PNL BLDV: 134 MMOL/L — LOW (ref 136–146)
GLUCOSE BLDC GLUCOMTR-MCNC: 306 MG/DL — HIGH (ref 70–99)
GLUCOSE BLDC GLUCOMTR-MCNC: 330 MG/DL — HIGH (ref 70–99)
GLUCOSE BLDC GLUCOMTR-MCNC: 351 MG/DL — HIGH (ref 70–99)
GLUCOSE BLDC GLUCOMTR-MCNC: 365 MG/DL — HIGH (ref 70–99)
GLUCOSE BLDV-MCNC: 357 MG/DL — HIGH (ref 70–99)
GLUCOSE SERPL-MCNC: 393 MG/DL — HIGH (ref 70–99)
GRAM STN FLD: SIGNIFICANT CHANGE UP
HCO3 BLDV-SCNC: 21 MMOL/L — SIGNIFICANT CHANGE UP (ref 20–27)
HCT VFR BLDV CALC: 22.3 % — LOW (ref 34.5–45)
HGB BLDV-MCNC: 7.1 G/DL — LOW (ref 11.5–15.5)
LACTATE BLDV-MCNC: 1.3 MMOL/L — SIGNIFICANT CHANGE UP (ref 0.5–2)
PCO2 BLDV: 63 MMHG — HIGH (ref 41–51)
PH BLDV: 7.19 PH — CRITICAL LOW (ref 7.32–7.43)
PO2 BLDV: 67 MMHG — HIGH (ref 35–40)
POTASSIUM BLDV-SCNC: 6.5 MMOL/L — CRITICAL HIGH (ref 3.4–4.5)
POTASSIUM SERPL-MCNC: 7.2 MMOL/L — CRITICAL HIGH (ref 3.5–5.3)
POTASSIUM SERPL-SCNC: 7.2 MMOL/L — CRITICAL HIGH (ref 3.5–5.3)
SAO2 % BLDV: 88.6 % — HIGH (ref 60–85)
SARS-COV-2 RNA SPEC QL NAA+PROBE: SIGNIFICANT CHANGE UP
SODIUM SERPL-SCNC: 131 MMOL/L — LOW (ref 135–145)
SPECIMEN SOURCE: SIGNIFICANT CHANGE UP

## 2020-04-05 PROCEDURE — 99233 SBSQ HOSP IP/OBS HIGH 50: CPT | Mod: GC

## 2020-04-05 PROCEDURE — 99233 SBSQ HOSP IP/OBS HIGH 50: CPT | Mod: GC,25

## 2020-04-05 PROCEDURE — 71045 X-RAY EXAM CHEST 1 VIEW: CPT | Mod: 26

## 2020-04-05 PROCEDURE — 99254 IP/OBS CNSLTJ NEW/EST MOD 60: CPT | Mod: GC

## 2020-04-05 PROCEDURE — 76770 US EXAM ABDO BACK WALL COMP: CPT | Mod: 26

## 2020-04-05 PROCEDURE — 36556 INSERT NON-TUNNEL CV CATH: CPT

## 2020-04-05 RX ORDER — SODIUM CHLORIDE 9 MG/ML
1000 INJECTION, SOLUTION INTRAVENOUS
Refills: 0 | Status: DISCONTINUED | OUTPATIENT
Start: 2020-04-05 | End: 2020-04-24

## 2020-04-05 RX ORDER — SODIUM ZIRCONIUM CYCLOSILICATE 10 G/10G
10 POWDER, FOR SUSPENSION ORAL ONCE
Refills: 0 | Status: COMPLETED | OUTPATIENT
Start: 2020-04-05 | End: 2020-04-05

## 2020-04-05 RX ORDER — ACETAMINOPHEN 500 MG
650 TABLET ORAL EVERY 6 HOURS
Refills: 0 | Status: DISCONTINUED | OUTPATIENT
Start: 2020-04-05 | End: 2020-04-13

## 2020-04-05 RX ORDER — ACETAMINOPHEN 500 MG
1000 TABLET ORAL ONCE
Refills: 0 | Status: COMPLETED | OUTPATIENT
Start: 2020-04-05 | End: 2020-04-05

## 2020-04-05 RX ORDER — INSULIN GLARGINE 100 [IU]/ML
20 INJECTION, SOLUTION SUBCUTANEOUS AT BEDTIME
Refills: 0 | Status: DISCONTINUED | OUTPATIENT
Start: 2020-04-05 | End: 2020-04-07

## 2020-04-05 RX ORDER — HEPARIN SODIUM 5000 [USP'U]/ML
5000 INJECTION INTRAVENOUS; SUBCUTANEOUS EVERY 12 HOURS
Refills: 0 | Status: DISCONTINUED | OUTPATIENT
Start: 2020-04-05 | End: 2020-04-12

## 2020-04-05 RX ORDER — DEXTROSE 50 % IN WATER 50 %
12.5 SYRINGE (ML) INTRAVENOUS ONCE
Refills: 0 | Status: DISCONTINUED | OUTPATIENT
Start: 2020-04-05 | End: 2020-04-07

## 2020-04-05 RX ORDER — CALCIUM GLUCONATE 100 MG/ML
1 VIAL (ML) INTRAVENOUS ONCE
Refills: 0 | Status: COMPLETED | OUTPATIENT
Start: 2020-04-05 | End: 2020-04-05

## 2020-04-05 RX ORDER — MIDODRINE HYDROCHLORIDE 2.5 MG/1
5 TABLET ORAL ONCE
Refills: 0 | Status: DISCONTINUED | OUTPATIENT
Start: 2020-04-05 | End: 2020-04-05

## 2020-04-05 RX ORDER — MIDODRINE HYDROCHLORIDE 2.5 MG/1
10 TABLET ORAL
Refills: 0 | Status: DISCONTINUED | OUTPATIENT
Start: 2020-04-05 | End: 2020-04-24

## 2020-04-05 RX ORDER — DEXTROSE 50 % IN WATER 50 %
25 SYRINGE (ML) INTRAVENOUS ONCE
Refills: 0 | Status: DISCONTINUED | OUTPATIENT
Start: 2020-04-05 | End: 2020-04-07

## 2020-04-05 RX ORDER — INSULIN LISPRO 100/ML
5 VIAL (ML) SUBCUTANEOUS
Refills: 0 | Status: DISCONTINUED | OUTPATIENT
Start: 2020-04-05 | End: 2020-04-07

## 2020-04-05 RX ORDER — FUROSEMIDE 40 MG
1 TABLET ORAL
Qty: 0 | Refills: 0 | DISCHARGE

## 2020-04-05 RX ORDER — INSULIN GLARGINE 100 [IU]/ML
15 INJECTION, SOLUTION SUBCUTANEOUS AT BEDTIME
Refills: 0 | Status: DISCONTINUED | OUTPATIENT
Start: 2020-04-05 | End: 2020-04-05

## 2020-04-05 RX ORDER — DEXTROSE 50 % IN WATER 50 %
15 SYRINGE (ML) INTRAVENOUS ONCE
Refills: 0 | Status: DISCONTINUED | OUTPATIENT
Start: 2020-04-05 | End: 2020-04-07

## 2020-04-05 RX ORDER — MIDODRINE HYDROCHLORIDE 2.5 MG/1
5 TABLET ORAL ONCE
Refills: 0 | Status: COMPLETED | OUTPATIENT
Start: 2020-04-05 | End: 2020-04-05

## 2020-04-05 RX ORDER — GLUCAGON INJECTION, SOLUTION 0.5 MG/.1ML
1 INJECTION, SOLUTION SUBCUTANEOUS ONCE
Refills: 0 | Status: DISCONTINUED | OUTPATIENT
Start: 2020-04-05 | End: 2020-04-24

## 2020-04-05 RX ORDER — MIDODRINE HYDROCHLORIDE 2.5 MG/1
5 TABLET ORAL
Refills: 0 | Status: DISCONTINUED | OUTPATIENT
Start: 2020-04-05 | End: 2020-04-05

## 2020-04-05 RX ORDER — LEFLUNOMIDE 10 MG/1
1 TABLET ORAL
Qty: 0 | Refills: 0 | DISCHARGE

## 2020-04-05 RX ORDER — BUMETANIDE 0.25 MG/ML
4 INJECTION INTRAMUSCULAR; INTRAVENOUS ONCE
Refills: 0 | Status: COMPLETED | OUTPATIENT
Start: 2020-04-05 | End: 2020-04-05

## 2020-04-05 RX ORDER — INSULIN HUMAN 100 [IU]/ML
5 INJECTION, SOLUTION SUBCUTANEOUS ONCE
Refills: 0 | Status: COMPLETED | OUTPATIENT
Start: 2020-04-05 | End: 2020-04-05

## 2020-04-05 RX ORDER — INSULIN HUMAN 100 [IU]/ML
10 INJECTION, SOLUTION SUBCUTANEOUS ONCE
Refills: 0 | Status: DISCONTINUED | OUTPATIENT
Start: 2020-04-05 | End: 2020-04-05

## 2020-04-05 RX ORDER — DEXTROSE 50 % IN WATER 50 %
50 SYRINGE (ML) INTRAVENOUS ONCE
Refills: 0 | Status: COMPLETED | OUTPATIENT
Start: 2020-04-05 | End: 2020-04-05

## 2020-04-05 RX ADMIN — BUMETANIDE 132 MILLIGRAM(S): 0.25 INJECTION INTRAMUSCULAR; INTRAVENOUS at 11:35

## 2020-04-05 RX ADMIN — SODIUM ZIRCONIUM CYCLOSILICATE 10 GRAM(S): 10 POWDER, FOR SUSPENSION ORAL at 10:26

## 2020-04-05 RX ADMIN — Medication 200 GRAM(S): at 01:16

## 2020-04-05 RX ADMIN — HEPARIN SODIUM 5000 UNIT(S): 5000 INJECTION INTRAVENOUS; SUBCUTANEOUS at 18:51

## 2020-04-05 RX ADMIN — BUMETANIDE 132 MILLIGRAM(S): 0.25 INJECTION INTRAMUSCULAR; INTRAVENOUS at 00:03

## 2020-04-05 RX ADMIN — Medication 650 MILLIGRAM(S): at 20:58

## 2020-04-05 RX ADMIN — MIDODRINE HYDROCHLORIDE 5 MILLIGRAM(S): 2.5 TABLET ORAL at 22:31

## 2020-04-05 RX ADMIN — INSULIN HUMAN 5 UNIT(S): 100 INJECTION, SOLUTION SUBCUTANEOUS at 10:48

## 2020-04-05 RX ADMIN — Medication 400 MILLIGRAM(S): at 06:59

## 2020-04-05 RX ADMIN — Medication 50 MILLILITER(S): at 10:26

## 2020-04-05 RX ADMIN — HEPARIN SODIUM 5000 UNIT(S): 5000 INJECTION INTRAVENOUS; SUBCUTANEOUS at 08:00

## 2020-04-05 RX ADMIN — Medication 500 UNIT(S): at 17:00

## 2020-04-05 RX ADMIN — INSULIN GLARGINE 15 UNIT(S): 100 INJECTION, SOLUTION SUBCUTANEOUS at 03:33

## 2020-04-05 RX ADMIN — SODIUM ZIRCONIUM CYCLOSILICATE 10 GRAM(S): 10 POWDER, FOR SUSPENSION ORAL at 00:03

## 2020-04-05 RX ADMIN — Medication 5 UNIT(S): at 18:51

## 2020-04-05 RX ADMIN — Medication 100 GRAM(S): at 10:27

## 2020-04-05 RX ADMIN — Medication 20 MILLIGRAM(S): at 01:16

## 2020-04-05 NOTE — CONSULT NOTE ADULT - SUBJECTIVE AND OBJECTIVE BOX
VASCULAR SURGERY CONSULT NOTE  --------------------------------------------------------------------------------------------    HPI:  Patient is a 74 y.o F w/ PMH HLD, HTN, insulin dependent T2DM, RA (on chronic prednisone, leflunomide, and hydroxychloroquine) c/b bronchiectasis (on home O2 4 L NC) who p/w worsening LE swelling. She states that about two weeks ago, she started experiencing weakness of her LE 2/2 her RA. Her prednisone dose was increased to 20 mg but she started to notice worsening LE swelling and SOB. She admits to orthopnea. She called her PMD this week who started her on lasix 20 daily w/ some improvement in her LE swelling but she continued to have SOB. She also admit to one watery BM a day which is unusual for her. Her PMD advised her to go to the ED. She denies any CP, palpitations, fever/chills, cough, sick contacts, or recent travel.     In the ED, vitals Tmax 98.8 HR 93-99 BP 99/40 -134/97 RR 20-28 SPO2 % on 4-5 L NC. Notable labs: WBC 16.61, k+ 7.5, creatinine 2.82. s/p       PAST MEDICAL & SURGICAL HISTORY:  Hyperlipidemia  Bronchiectasis  Type 2 diabetes mellitus  Hypertension  Rheumatoid arthritis  No significant past surgical history      FAMILY HISTORY:  No pertinent family history in first degree relatives  Family history not pertinent as reviewed with the patient and family      ALLERGIES: No Known Allergies      CURRENT MEDICATIONS  MEDICATIONS (STANDING): dextrose 5%. 1000 milliLiter(s) IV Continuous <Continuous>  dextrose 50% Injectable 12.5 Gram(s) IV Push once  dextrose 50% Injectable 25 Gram(s) IV Push once  dextrose 50% Injectable 25 Gram(s) IV Push once  heparin  flush 100 Units/mL Injectable 500 Unit(s) IV Push once  heparin  Injectable 5000 Unit(s) SubCutaneous every 12 hours  insulin glargine Injectable (LANTUS) 15 Unit(s) SubCutaneous at bedtime  predniSONE   Tablet 20 milliGRAM(s) Oral daily    MEDICATIONS (PRN):dextrose 40% Gel 15 Gram(s) Oral once PRN Blood Glucose LESS THAN 70 milliGRAM(s)/deciliter  glucagon  Injectable 1 milliGRAM(s) IntraMuscular once PRN Glucose LESS THAN 70 milligrams/deciliter    --------------------------------------------------------------------------------------------    Vitals:   T(C): 36.1 (20 @ 03:00), Max: 36.9 (20 @ 19:28)  HR: 90 (20 @ 03:00) (90 - 95)  BP: 136/82 (20 @ 03:00) (109/40 - 136/82)  RR: 21 (20 @ 03:00) (21 - 24)  SpO2: 100% (20 @ 03:00) (99% - 100%)  CAPILLARY BLOOD GLUCOSE    POCT Blood Glucose.: 306 mg/dL (2020 10:34)  POCT Blood Glucose.: 351 mg/dL (2020 03:29)  POCT Blood Glucose.: 365 mg/dL (2020 01:02)  POCT Blood Glucose.: 416 mg/dL (2020 22:39)  POCT Blood Glucose.: 375 mg/dL (2020 20:02)      PHYSICAL EXAM:   General: NAD, Lying in bed comfortably  Neuro: A+Ox3  HEENT: NC/AT, EOMI  Neck: Soft, supple  Cardio: RRR, nml S1/S2  Resp: Good effort, CTA b/l  GI/Abd: Soft, NT/ND, no rebound/guarding, no masses palpated  Vascular: All 4 extremities warm.  Skin: Intact, no breakdown  Lymphatic/Nodes: No palpable lymphadenopathy  Musculoskeletal: All 4 extremities moving spontaneously, no limitations  --------------------------------------------------------------------------------------------    LABS  CBC ( 06:20)                              8.0<L>                         13.22<H>  )----------------(  159        92.9<H>% Neutrophils, 1.4<L>% Lymphocytes, ANC: 12.28<H>                              27.7<L>  CBC ( @ 21:00)                              8.0<L>                         15.61<H>  )----------------(  142<L>     --    % Neutrophils, --    % Lymphocytes, ANC: --                                  27.9<L>    BMP ( 06:20)             137     |  97<L>   |  109<H>		Ca++ --      Ca 10.1               ---------------------------------( 305<H>		Mg --                 6.9<HH>  |  23      |  3.31<H>			Ph --      BMP ( @ 03:11)             133<L>  |  96<L>   |  108<H>		Ca++ --      Ca 9.8                ---------------------------------( 348<H>		Mg --                 6.7<HH>  |  23      |  3.10<H>			Ph --        LFTs ( 13:50)      TPro 6.5 / Alb 2.3<L> / TBili 0.3 / DBili -- / AST 20 / ALT 27 / AlkPhos 187<H>  LFTs ( 13:00)      TPro 6.9 / Alb 2.4<L> / TBili 0.4 / DBili -- / AST 22 / ALT 28 / AlkPhos 201<H>    Coags ( 06:20)  aPTT 25.6<L> / INR 1.13 / PT 13.0  Coags ( @ 13:00)  aPTT 24.2<L> / INR 1.24<H> / PT 14.3<H>        VBG ( @ 06:20)     7.11<LL> / 80<HH> / 44<H> / 20 / -4.3 / 66.2%     Lactate: --  VBG ( @ 00:09)     7.19<LL> / 63<H> / 67<H> / 21 / -3.8 / 88.6<H>%     Lactate: 1.3    --------------------------------------------------------------------------------------------    MICROBIOLOGY  Urinalysis ( @ 21:00):     Color: YELLOW / Appearance: CLEAR / S.019 / pH: 6.0 / Gluc: NEGATIVE / Ketones: NEGATIVE / Bili: NEGATIVE / Urobili: 0.2 / Protein :MODERATE / Nitrites: NEGATIVE / Leuk.Est: SMALL / RBC: NONE / WBC: 2-5 / Sq Epi:  / Non Sq Epi: FEW / Bacteria MODERATE<H>         --------------------------------------------------------------------------------------------    IMAGING  None pertinent

## 2020-04-05 NOTE — CONSULT NOTE ADULT - ASSESSMENT
73 y/o female w/PMHx of HTN, HLD, T2DM on insulin, rheumatoid arthritis on chronic prednisone, bronchiectasis, admitted with NBA and acute on chronic dCHF (being ruled out for COVID). Patient now with worsening anuric NBA requiring renal replacement therapy refractory to medical management. MICU called for consideration of CRRT.    Anuric NBA requiring RRT  R/o COVID  Agree with the necessity for dialysis. Given normotensive now no acute need for CRRT  Recommend pursuing HD per nephrology team  Recommend post HD labs  F/u COVID    Patient seen and discussed with Dr. Mariano Norman MD PGY-5

## 2020-04-05 NOTE — CONSULT NOTE ADULT - SUBJECTIVE AND OBJECTIVE BOX
[facility] DIVISION OF KIDNEY DISEASES AND HYPERTENSION -- INITIAL CONSULT NOTE  --------------------------------------------------------------------------------  HPI: 73 yo F with RA c/b chronic bronchiectasis, HTN, and DM2 is admitted with SOB. Pt. currently with suspected COVID-19. On admission (4/4/20), Scr was elevated to 2.82, which as worsened to 3.31 today (4/5/20). Serum potassium was elevated to 7.5 on admission, and despite multiple rounds of medical management, remains elevated at 6.9 today. Pt. also noted to be hypoalbuminemic and primarily with respiratory acidosis with pH of 7.11. Nephrology team is consulted for NBA and hyperkalemia.    Pt. seen and examined at bedside this AM in ER. Pt. states that she had recent medication changes which includes increasing of prednisone (d/t her RA). She developed LE edema over the week and was prescribed Lasix by her PMD. Pt. continued to have LE edema and SOB, along with orthopnea. Pt. was asked to come to ER for further evaluation. Pt. denies kidney disease history, or family-related kidney diagnoses. States that she has been urinating a normal amount. Admits to not being able to eat/drink normally. [facility] with normal Scr of 0.96 on 6/7/16, however without other more recent labs for review. Pt. currently denies CP, SOB, N/V/F/C.    Thoroughly reviewed risks and benefits of RRT/HD. Pt. agreeable to dialytic therapy if needed.    PAST HISTORY  --------------------------------------------------------------------------------  PAST MEDICAL & SURGICAL HISTORY:  Hyperlipidemia  Bronchiectasis  Type 2 diabetes mellitus  Hypertension  Rheumatoid arthritis  No significant past surgical history    FAMILY HISTORY:  No pertinent family history in first degree relatives  Denies family history of kidney disease    PAST SOCIAL HISTORY:  Denies alcohol/tobacco use    ALLERGIES & MEDICATIONS  --------------------------------------------------------------------------------  Allergies    No Known Allergies    Intolerances    Standing Inpatient Medications  dextrose 5%. 1000 milliLiter(s) IV Continuous <Continuous>  dextrose 50% Injectable 12.5 Gram(s) IV Push once  dextrose 50% Injectable 25 Gram(s) IV Push once  dextrose 50% Injectable 25 Gram(s) IV Push once  heparin  Injectable 5000 Unit(s) SubCutaneous every 12 hours  insulin glargine Injectable (LANTUS) 15 Unit(s) SubCutaneous at bedtime  predniSONE   Tablet 20 milliGRAM(s) Oral daily    PRN Inpatient Medications  dextrose 40% Gel 15 Gram(s) Oral once PRN  glucagon  Injectable 1 milliGRAM(s) IntraMuscular once PRN    REVIEW OF SYSTEMS  --------------------------------------------------------------------------------  Gen: + lethargy, + fatigue  Respiratory: + dyspnea, + orthopnea  CV: No chest pain  GI: + decreased oral intake  MSK: + LE edema  Neuro: No dizziness  Heme: No bleeding  Skin: + ruptured bulae/wound of right foot  Psych: No depression    All other systems were reviewed and are negative, except as noted.    VITALS/PHYSICAL EXAM  --------------------------------------------------------------------------------  T(C): 36.1 (04-05-20 @ 03:00), Max: 37.1 (04-04-20 @ 12:39)  HR: 90 (04-05-20 @ 03:00) (90 - 99)  BP: 136/82 (04-05-20 @ 03:00) (109/40 - 136/82)  RR: 21 (04-05-20 @ 03:00) (21 - 28)  SpO2: 100% (04-05-20 @ 03:00) (99% - 100%)  Wt(kg): --    Physical Exam:  	Gen: NAD  	HEENT: supple neck  	Pulm: diminished bibasilar breath sounds  	CV: RRR, S1S2; no rub  	Abd: +BS, soft, distended  	: + Cevallos catheter with 400cc of urine since insertion  	LE: Pitting edema, ? lymphedema b/l  	Skin: Right dorsal foot wound  	Psych: withdrawn affect  	Vascular access: none    LABS/STUDIES  --------------------------------------------------------------------------------              8.0    13.22 >-----------<  159      [04-05-20 @ 06:20]              27.7     137  |  97  |  109  ----------------------------<  305      [04-05-20 @ 06:20]  6.9   |  23  |  3.31        Ca     10.1     [04-05-20 @ 06:20]    Creatinine Trend:  SCr 3.31 [04-05 @ 06:20]  SCr 3.10 [04-05 @ 03:11]  SCr 3.05 [04-05 @ 00:09]  SCr 3.15 [04-04 @ 21:00]  SCr 2.97 [04-04 @ 13:50]    Urinalysis - [04-04-20 @ 21:00]      Color YELLOW / Appearance CLEAR / SG 1.019 / pH 6.0      Gluc NEGATIVE / Ketone NEGATIVE  / Bili NEGATIVE / Urobili 0.2       Blood NEGATIVE / Protein MODERATE / Leuk Est SMALL / Nitrite NEGATIVE      RBC NONE / WBC 2-5 / Hyaline  / Gran  / Sq Epi  / Non Sq Epi FEW / Bacteria MODERATE [facility] DIVISION OF KIDNEY DISEASES AND HYPERTENSION -- INITIAL CONSULT NOTE  --------------------------------------------------------------------------------  HPI: 73 yo F with RA c/b chronic bronchiectasis, HTN, and DM2 is admitted with SOB. Pt. currently with suspected COVID-19. On admission (4/4/20), Scr was elevated to 2.82, which as worsened to 3.31 today (4/5/20). Serum potassium was elevated to 7.5 on admission, and despite multiple rounds of medical management, remains elevated at 6.9 today. Pt. also noted to be hypoalbuminemic and primarily with respiratory acidosis with pH of 7.11. Nephrology team is consulted for NBA and hyperkalemia.    Pt. seen and examined at bedside this AM in ER. Pt. states that she had recent medication changes which includes increasing of prednisone (d/t her RA). She developed LE edema over the week and was prescribed Lasix by her PMD. Pt. continued to have LE edema and SOB, along with orthopnea. Pt. was asked to come to ER for further evaluation. Pt. denies kidney disease history, or family-related kidney diagnoses. States that she has been urinating a normal amount. Admits to not being able to eat/drink normally. [facility] pt. with normal Scr of 0.96 on 6/7/16, however without other more recent labs for review. Pt. currently denies CP, SOB, N/V/F/C.    Thoroughly reviewed risks and benefits of RRT/HD. Pt. agreeable to dialytic therapy if absolutely needed.    PAST HISTORY  --------------------------------------------------------------------------------  PAST MEDICAL & SURGICAL HISTORY:  Hyperlipidemia  Bronchiectasis  Type 2 diabetes mellitus  Hypertension  Rheumatoid arthritis  No significant past surgical history    FAMILY HISTORY:  No pertinent family history in first degree relatives  Denies family history of kidney disease    PAST SOCIAL HISTORY:  Denies alcohol/tobacco use    ALLERGIES & MEDICATIONS  --------------------------------------------------------------------------------  Allergies    No Known Allergies    Intolerances    Standing Inpatient Medications  dextrose 5%. 1000 milliLiter(s) IV Continuous <Continuous>  dextrose 50% Injectable 12.5 Gram(s) IV Push once  dextrose 50% Injectable 25 Gram(s) IV Push once  dextrose 50% Injectable 25 Gram(s) IV Push once  heparin  Injectable 5000 Unit(s) SubCutaneous every 12 hours  insulin glargine Injectable (LANTUS) 15 Unit(s) SubCutaneous at bedtime  predniSONE   Tablet 20 milliGRAM(s) Oral daily    PRN Inpatient Medications  dextrose 40% Gel 15 Gram(s) Oral once PRN  glucagon  Injectable 1 milliGRAM(s) IntraMuscular once PRN    REVIEW OF SYSTEMS  --------------------------------------------------------------------------------  Gen: + lethargy, + fatigue  Respiratory: + dyspnea, + orthopnea  CV: No chest pain  GI: + decreased oral intake  MSK: + LE edema  Neuro: No dizziness  Heme: No bleeding  Skin: + ruptured bulae/wound of right foot  Psych: No depression    All other systems were reviewed and are negative, except as noted.    VITALS/PHYSICAL EXAM  --------------------------------------------------------------------------------  T(C): 36.1 (04-05-20 @ 03:00), Max: 37.1 (04-04-20 @ 12:39)  HR: 90 (04-05-20 @ 03:00) (90 - 99)  BP: 136/82 (04-05-20 @ 03:00) (109/40 - 136/82)  RR: 21 (04-05-20 @ 03:00) (21 - 28)  SpO2: 100% (04-05-20 @ 03:00) (99% - 100%)  Wt(kg): --    Physical Exam:  	Gen: NAD  	HEENT: supple neck  	Pulm: diminished bibasilar breath sounds  	CV: RRR, S1S2; no rub  	Abd: +BS, soft, distended  	: + Cevallos catheter with 400cc of urine since insertion  	LE: Pitting edema, ? lymphedema b/l  	Skin: Right dorsal foot wound  	Psych: withdrawn affect  	Vascular access: none    LABS/STUDIES  --------------------------------------------------------------------------------              8.0    13.22 >-----------<  159      [04-05-20 @ 06:20]              27.7     137  |  97  |  109  ----------------------------<  305      [04-05-20 @ 06:20]  6.9   |  23  |  3.31        Ca     10.1     [04-05-20 @ 06:20]    Creatinine Trend:  SCr 3.31 [04-05 @ 06:20]  SCr 3.10 [04-05 @ 03:11]  SCr 3.05 [04-05 @ 00:09]  SCr 3.15 [04-04 @ 21:00]  SCr 2.97 [04-04 @ 13:50]    Urinalysis - [04-04-20 @ 21:00]      Color YELLOW / Appearance CLEAR / SG 1.019 / pH 6.0      Gluc NEGATIVE / Ketone NEGATIVE  / Bili NEGATIVE / Urobili 0.2       Blood NEGATIVE / Protein MODERATE / Leuk Est SMALL / Nitrite NEGATIVE      RBC NONE / WBC 2-5 / Hyaline  / Gran  / Sq Epi  / Non Sq Epi FEW / Bacteria MODERATE

## 2020-04-05 NOTE — PROVIDER CONTACT NOTE (OTHER) - ACTION/TREATMENT ORDERED:
ordered to okay to resume HD tx with 200ml/min of BFR and no fluid removal if HR less than 140's  Resumed HD tx with HR 106bpm

## 2020-04-05 NOTE — PROGRESS NOTE ADULT - PROBLEM SELECTOR PLAN 7
-Lantus 20 units at bedtime   -Start Humalog 5 units TID with meals   -Low dose correctional scale   -Renal- carb consistent diet   -Inpatient BG goal of 100-180

## 2020-04-05 NOTE — CONSULT NOTE ADULT - ASSESSMENT
ASSESSMENT: Patient is a 74 y.o F w/ PMH HLD, HTN, insulin dependent T2DM, RA (on chronic prednisone and leflunomide) c/b bronchiectasis (on home O2 4 L NC) who is admitted with acute renal failure c/b hyperkalemia and acute on chronic diastolic heart failure     PLAN:   - r/b/a of shiley placement explained to patient - she is in agreement with plan  - Emergent shiley placed at bedside (Left IJ), consent signed and in chart  - Dialysis per nephrology    Discussed with vascular surgery fellow    Get Gutierrez, PGY-3  C Team Surgery [id number]

## 2020-04-05 NOTE — PROGRESS NOTE ADULT - SUBJECTIVE AND OBJECTIVE BOX
Patient is a 74y old  Female who presents with a chief complaint of LE swelling      SUBJECTIVE / OVERNIGHT EVENTS: No acute events overnight.  Reported feeling more comfortable now   Most recent K is 6.7- given IV insulin, D50 and calcium gluconate     MEDICATIONS  (STANDING):  dextrose 5%. 1000 milliLiter(s) (50 mL/Hr) IV Continuous <Continuous>  dextrose 50% Injectable 12.5 Gram(s) IV Push once  dextrose 50% Injectable 25 Gram(s) IV Push once  dextrose 50% Injectable 25 Gram(s) IV Push once  heparin  flush 100 Units/mL Injectable 500 Unit(s) IV Push once  heparin  Injectable 5000 Unit(s) SubCutaneous every 12 hours  insulin glargine Injectable (LANTUS) 15 Unit(s) SubCutaneous at bedtime  predniSONE   Tablet 20 milliGRAM(s) Oral daily    MEDICATIONS  (PRN):  dextrose 40% Gel 15 Gram(s) Oral once PRN Blood Glucose LESS THAN 70 milliGRAM(s)/deciliter  glucagon  Injectable 1 milliGRAM(s) IntraMuscular once PRN Glucose LESS THAN 70 milligrams/deciliter      T(C): 36.1 (20 @ 03:00), Max: 36.9 (20 @ 19:28)  HR: 89 (20 @ 15:00) (89 - 95)  BP: 118/50 (20 @ 15:00) (109/40 - 136/82)  RR: 20 (20 @ 15:00) (20 - 24)  SpO2: 97% (20 @ 15:00) (97% - 100%)    CAPILLARY BLOOD GLUCOSE      POCT Blood Glucose.: 306 mg/dL (2020 10:34)  POCT Blood Glucose.: 351 mg/dL (2020 03:29)  POCT Blood Glucose.: 365 mg/dL (2020 01:02)  POCT Blood Glucose.: 416 mg/dL (2020 22:39)  POCT Blood Glucose.: 375 mg/dL (2020 20:02)    I&O's Summary      PHYSICAL EXAM:  GENERAL: not in distress, on room air  EYES: open, sclera clear b/l  CHEST/LUNG: normal respiratory effort, not tachypneic, speaking in full sentences without difficulty  HEART: Not tachycardic, no lower extremity edema b/l  ABDOMEN: Soft, Nontender, Nondistended  EXTREMITIES: 2-3+ pitting edema, Multiple erythematous seeping wound in B/L LE   NEUROLOGY: awake, alert, responds to Qs appropriately, no gross deficits  PSYCH: calm, cooperative  SKIN: No visible rashes or lesions    LABS:                        8.0    13.22 )-----------( 159      ( 2020 06:20 )             27.7     04-05    137  |  97<L>  |  109<H>  ----------------------------<  305<H>  6.9<HH>   |  23  |  3.31<H>    Ca    10.1      2020 06:20    TPro  6.5  /  Alb  2.3<L>  /  TBili  0.3  /  DBili  x   /  AST  20  /  ALT  27  /  AlkPhos  187<H>  04-04    PT/INR - ( 2020 06:20 )   PT: 13.0 SEC;   INR: 1.13          PTT - ( 2020 06:20 )  PTT:25.6 SEC      Color: YELLOW / Appearance: CLEAR / S.019 / pH: 6.0  Gluc: NEGATIVE / Ketone: NEGATIVE  / Bili: NEGATIVE / Urobili: 0.2   Blood: NEGATIVE / Protein: MODERATE / Nitrite: NEGATIVE   Leuk Esterase: SMALL / RBC: NONE / WBC 2-5   Sq Epi: x / Non Sq Epi: FEW / Bacteria: MODERATE

## 2020-04-05 NOTE — PROGRESS NOTE ADULT - PROBLEM SELECTOR PLAN 2
-s/p lasix 40 IVP x1, R insulin 7 U x2 ; k+ decreased to 6.7  -EKG sinus but P waves flattened and appear prolonged, s/p calcium gluconate 1 g x1 and 2 g x2  -monitor on telemetry   -S/p bumex 4 IV x1, sodium bicarb 50 IV x1, lokelma 10 g x1  -Now will be initiated on CVVH

## 2020-04-05 NOTE — CONSULT NOTE ADULT - SUBJECTIVE AND OBJECTIVE BOX
CHIEF COMPLAINT: Concern for dialysis    HPI: 75 y/o female w/PMHx of HTN, HLD, T2DM on insulin, rheumatoid arthritis on chronic prednisone, bronchiectasis, admitted with NBA and acute on chronic dCHF (being ruled out for COVID). Patient now with worsening anuric NBA requiring renal replacement therapy refractory to medical management. MICU called for consideration of CRRT.  Of note patient has K peak of 7.2, Cr of 3.31. Unclear baseline but on admission 2.82. Concern the patient may need CRRT due to hemodynamic instability.     PAST MEDICAL & SURGICAL HISTORY:  Hyperlipidemia  Bronchiectasis  Type 2 diabetes mellitus  Hypertension  Rheumatoid arthritis  No significant past surgical history      FAMILY HISTORY:  No pertinent family history in first degree relatives      SOCIAL HISTORY:  Smoking: __ packs x ___ years  EtOH Use:  Marital Status:  Occupation:  Recent Travel:  Country of Birth:  Advance Directives:    Allergies    No Known Allergies    Intolerances        HOME MEDICATIONS:    REVIEW OF SYSTEMS:  Negative except as noted in HPI  [ ] All other systems negative  [ ] Unable to assess ROS because ________    OBJECTIVE:  ICU Vital Signs Last 24 Hrs  T(C): 36.1 (2020 03:00), Max: 36.9 (2020 19:28)  T(F): 97 (2020 03:00), Max: 98.5 (2020 19:28)  HR: 89 (2020 15:00) (89 - 95)  BP: 118/50 (2020 15:00) (109/40 - 136/82)  BP(mean): --  ABP: --  ABP(mean): --  RR: 20 (2020 15:00) (20 - 24)  SpO2: 97% (2020 15:00) (97% - 100%)        CAPILLARY BLOOD GLUCOSE      POCT Blood Glucose.: 306 mg/dL (2020 10:34)      PHYSICAL EXAM:  General: No acute distress  Respiratory: Clear lungs  Cardiovascular: S1 S2 No MRG  Abdomen: Soft  Extremities: Edematous  Skin: No rashes  Neurological: Grossly intact    HOSPITAL MEDICATIONS:  MEDICATIONS  (STANDING):  dextrose 5%. 1000 milliLiter(s) (50 mL/Hr) IV Continuous <Continuous>  dextrose 50% Injectable 12.5 Gram(s) IV Push once  dextrose 50% Injectable 25 Gram(s) IV Push once  dextrose 50% Injectable 25 Gram(s) IV Push once  heparin  flush 100 Units/mL Injectable 500 Unit(s) IV Push once  heparin  Injectable 5000 Unit(s) SubCutaneous every 12 hours  insulin glargine Injectable (LANTUS) 20 Unit(s) SubCutaneous at bedtime  insulin lispro Injectable (HumaLOG) 5 Unit(s) SubCutaneous three times a day with meals  predniSONE   Tablet 20 milliGRAM(s) Oral daily    MEDICATIONS  (PRN):  dextrose 40% Gel 15 Gram(s) Oral once PRN Blood Glucose LESS THAN 70 milliGRAM(s)/deciliter  glucagon  Injectable 1 milliGRAM(s) IntraMuscular once PRN Glucose LESS THAN 70 milligrams/deciliter  midodrine. 5 milliGRAM(s) Oral once PRN 30 minutes prior to HD      LABS:                        8.0    13.22 )-----------( 159      ( 2020 06:20 )             27.7     04-    135  |  98  |  109<H>  ----------------------------<  416<H>  6.7<HH>   |  22  |  3.21<H>    Ca    10.4      2020 12:00  Phos  6.6     04-05  Mg     2.3     04-05    TPro  6.5  /  Alb  2.3<L>  /  TBili  0.3  /  DBili  x   /  AST  20  /  ALT  27  /  AlkPhos  187<H>  04-04    PT/INR - ( 2020 06:20 )   PT: 13.0 SEC;   INR: 1.13          PTT - ( 2020 06:20 )  PTT:25.6 SEC  Urinalysis Basic - ( 2020 21:00 )    Color: YELLOW / Appearance: CLEAR / S.019 / pH: 6.0  Gluc: NEGATIVE / Ketone: NEGATIVE  / Bili: NEGATIVE / Urobili: 0.2   Blood: NEGATIVE / Protein: MODERATE / Nitrite: NEGATIVE   Leuk Esterase: SMALL / RBC: NONE / WBC 2-5   Sq Epi: x / Non Sq Epi: FEW / Bacteria: MODERATE        Venous Blood Gas:   @ 06:20  7.11/80/44/20/66.2  VBG Lactate: --  Venous Blood Gas:   @ 00:09  7.19/63/67/21/88.6  VBG Lactate: 1.3      MICROBIOLOGY:     RADIOLOGY:  [ ] Reviewed and interpreted by me    EKG:

## 2020-04-05 NOTE — PROGRESS NOTE ADULT - PROBLEM SELECTOR PLAN 1
P/w acute renal failure creatinine 3.15 (baseline 1.02 from 3/25) with hyperkalemia and oligouria  -Nephrology consulted- now will be proceeding to CVVH due to persistent hyperkalemia   -f/u repeat BMP  -Strict I&Os  -avoid nephrotoxic agents  -Renal ultrasound showing large renal cyst on left upper quadrant   -f/u U Na, Ucreatinine, Uprotein/creatinine, hepBsAg, hepatitis C antibody.

## 2020-04-05 NOTE — PROVIDER CONTACT NOTE (OTHER) - SITUATION
15mins into HD tx, pt's BP dropped to 90/42 with c/o dizzy, nss replaced and UF off, lowered BFR to 300ml/min, 30mins into tx, HR raised to 140's(on tele monitor)

## 2020-04-05 NOTE — CONSULT NOTE ADULT - PROBLEM SELECTOR RECOMMENDATION 9
Pt. with severe hyperkalemia in the setting of NBA. Pt. noted to have elevated serum potassium of 7.5 on admission (4/4/20). Pt. was attempted with multiple rounds of medical management of hyperkalemia with Lokelma, Lasix IV, Bumex IV, sodium bicarbonate IVP, insulin, and calcium gluconate. Pt. remains with elevated serum potassium today at 6.9. Pt. with low BP. Plan to initiate CRRT/CVVHDF with 0K concentration fluids. Monitor serum potassium Pt. with severe hyperkalemia in the setting of NBA. Pt. noted to have elevated serum potassium of 7.5 on admission (4/4/20). Pt. was attempted with multiple rounds of medical management of hyperkalemia with Lokelma, Lasix IV, Bumex IV, sodium bicarbonate IVP, insulin, and calcium gluconate. Pt. remains with elevated serum potassium today at 6.9. Pt. with low BP. Recommend continue medical management with Lokelma, sodium bicarbonate IVP, insulin, and calcium gluconate. Monitor serum potassium

## 2020-04-05 NOTE — CONSULT NOTE ADULT - PROBLEM SELECTOR RECOMMENDATION 2
Pt. with likely NBA in the setting of infection, low BP, and decreased oral intake. Pt. however with possibly CKD in the setting of long-standing history of DM and HTN. Upon lab review of [facility], pt. with normal Scr of 0.96 on 6/7/16, however without other more recent labs for review. On admission (4/4/20), Scr was elevated to 2.82, which as worsened to 3.31 today (4/5/20). Will plan to initiate CRRT/CVVHDF for hyperkalemia. Pt. hypoalbuminemic and with protein on UA. Recommend serological workup including HepBsAg, HepC Ab, ANCAs, anti-GBM, serum immunofixation, C3 and C4. Check urine electrolytes and spot urine TP/CR. Renal US without hydronephrosis, however with large complex cystic lesion in LUQ. Monitor labs and urine output Pt. with likely NBA in the setting of infection, low BP, and decreased oral intake. Pt. however with possibly CKD in the setting of long-standing history of DM and HTN. Upon lab review of [facility], pt. with normal Scr of 0.96 on 6/7/16, however without other more recent labs for review. On admission (4/4/20), Scr was elevated to 2.82, which as worsened to 3.31 today (4/5/20). Pt. hypoalbuminemic and with protein on UA. Recommend serological workup including HepBsAg, HepC Ab, ANCAs, anti-GBM, serum immunofixation, C3 and C4. Check urine electrolytes and spot urine TP/CR. Renal US without hydronephrosis, however with large complex cystic lesion in LUQ. Monitor labs and urine output

## 2020-04-06 LAB
ALBUMIN SERPL ELPH-MCNC: 1.9 G/DL — LOW (ref 3.3–5)
ALBUMIN SERPL ELPH-MCNC: 2.2 G/DL — LOW (ref 3.3–5)
ALP SERPL-CCNC: 155 U/L — HIGH (ref 40–120)
ALP SERPL-CCNC: 165 U/L — HIGH (ref 40–120)
ALT FLD-CCNC: 31 U/L — SIGNIFICANT CHANGE UP (ref 4–33)
ALT FLD-CCNC: 32 U/L — SIGNIFICANT CHANGE UP (ref 4–33)
ANION GAP SERPL CALC-SCNC: 12 MMO/L — SIGNIFICANT CHANGE UP (ref 7–14)
ANION GAP SERPL CALC-SCNC: 14 MMO/L — SIGNIFICANT CHANGE UP (ref 7–14)
ANISOCYTOSIS BLD QL: SLIGHT — SIGNIFICANT CHANGE UP
AST SERPL-CCNC: 27 U/L — SIGNIFICANT CHANGE UP (ref 4–32)
AST SERPL-CCNC: 32 U/L — SIGNIFICANT CHANGE UP (ref 4–32)
BASOPHILS # BLD AUTO: 0.04 K/UL — SIGNIFICANT CHANGE UP (ref 0–0.2)
BASOPHILS NFR BLD AUTO: 0.3 % — SIGNIFICANT CHANGE UP (ref 0–2)
BASOPHILS NFR SPEC: 0 % — SIGNIFICANT CHANGE UP (ref 0–2)
BILIRUB SERPL-MCNC: 0.3 MG/DL — SIGNIFICANT CHANGE UP (ref 0.2–1.2)
BILIRUB SERPL-MCNC: 0.3 MG/DL — SIGNIFICANT CHANGE UP (ref 0.2–1.2)
BLASTS # FLD: 0 % — SIGNIFICANT CHANGE UP (ref 0–0)
BUN SERPL-MCNC: 100 MG/DL — HIGH (ref 7–23)
BUN SERPL-MCNC: 98 MG/DL — HIGH (ref 7–23)
CALCIUM SERPL-MCNC: 8.7 MG/DL — SIGNIFICANT CHANGE UP (ref 8.4–10.5)
CALCIUM SERPL-MCNC: 8.9 MG/DL — SIGNIFICANT CHANGE UP (ref 8.4–10.5)
CHLORIDE SERPL-SCNC: 101 MMOL/L — SIGNIFICANT CHANGE UP (ref 98–107)
CHLORIDE SERPL-SCNC: 102 MMOL/L — SIGNIFICANT CHANGE UP (ref 98–107)
CO2 SERPL-SCNC: 22 MMOL/L — SIGNIFICANT CHANGE UP (ref 22–31)
CO2 SERPL-SCNC: 25 MMOL/L — SIGNIFICANT CHANGE UP (ref 22–31)
CREAT SERPL-MCNC: 2.89 MG/DL — HIGH (ref 0.5–1.3)
CREAT SERPL-MCNC: 3.06 MG/DL — HIGH (ref 0.5–1.3)
E COLI DNA BLD POS QL NAA+NON-PROBE: SIGNIFICANT CHANGE UP
EOSINOPHIL # BLD AUTO: 0.02 K/UL — SIGNIFICANT CHANGE UP (ref 0–0.5)
EOSINOPHIL NFR BLD AUTO: 0.1 % — SIGNIFICANT CHANGE UP (ref 0–6)
EOSINOPHIL NFR FLD: 0.9 % — SIGNIFICANT CHANGE UP (ref 0–6)
FERRITIN SERPL-MCNC: 963.1 NG/ML — HIGH (ref 15–150)
GLUCOSE BLDC GLUCOMTR-MCNC: 220 MG/DL — HIGH (ref 70–99)
GLUCOSE BLDC GLUCOMTR-MCNC: 226 MG/DL — HIGH (ref 70–99)
GLUCOSE BLDC GLUCOMTR-MCNC: 232 MG/DL — HIGH (ref 70–99)
GLUCOSE BLDC GLUCOMTR-MCNC: 234 MG/DL — HIGH (ref 70–99)
GLUCOSE BLDC GLUCOMTR-MCNC: 240 MG/DL — HIGH (ref 70–99)
GLUCOSE BLDC GLUCOMTR-MCNC: 409 MG/DL — HIGH (ref 70–99)
GLUCOSE SERPL-MCNC: 192 MG/DL — HIGH (ref 70–99)
GLUCOSE SERPL-MCNC: 319 MG/DL — HIGH (ref 70–99)
GRAM STN FLD: SIGNIFICANT CHANGE UP
GRAM STN FLD: SIGNIFICANT CHANGE UP
HCT VFR BLD CALC: 26.7 % — LOW (ref 34.5–45)
HGB BLD-MCNC: 7.7 G/DL — LOW (ref 11.5–15.5)
IMM GRANULOCYTES NFR BLD AUTO: 4 % — HIGH (ref 0–1.5)
LYMPHOCYTES # BLD AUTO: 0.23 K/UL — LOW (ref 1–3.3)
LYMPHOCYTES # BLD AUTO: 1.5 % — LOW (ref 13–44)
LYMPHOCYTES NFR SPEC AUTO: 0.9 % — LOW (ref 13–44)
MACROCYTES BLD QL: SLIGHT — SIGNIFICANT CHANGE UP
MAGNESIUM SERPL-MCNC: 2.1 MG/DL — SIGNIFICANT CHANGE UP (ref 1.6–2.6)
MCHC RBC-ENTMCNC: 27.4 PG — SIGNIFICANT CHANGE UP (ref 27–34)
MCHC RBC-ENTMCNC: 28.8 % — LOW (ref 32–36)
MCV RBC AUTO: 95 FL — SIGNIFICANT CHANGE UP (ref 80–100)
METAMYELOCYTES # FLD: 0.9 % — SIGNIFICANT CHANGE UP (ref 0–1)
METHOD TYPE: SIGNIFICANT CHANGE UP
MONOCYTES # BLD AUTO: 0.44 K/UL — SIGNIFICANT CHANGE UP (ref 0–0.9)
MONOCYTES NFR BLD AUTO: 3 % — SIGNIFICANT CHANGE UP (ref 2–14)
MONOCYTES NFR BLD: 1.7 % — LOW (ref 2–9)
MYELOCYTES NFR BLD: 1.8 % — HIGH (ref 0–0)
NEUTROPHIL AB SER-ACNC: 75.2 % — SIGNIFICANT CHANGE UP (ref 43–77)
NEUTROPHILS # BLD AUTO: 13.54 K/UL — HIGH (ref 1.8–7.4)
NEUTROPHILS NFR BLD AUTO: 91.1 % — HIGH (ref 43–77)
NEUTS BAND # BLD: 17.7 % — HIGH (ref 0–6)
NRBC # FLD: 0.04 K/UL — SIGNIFICANT CHANGE UP (ref 0–0)
OTHER - HEMATOLOGY %: 0 — SIGNIFICANT CHANGE UP
OVALOCYTES BLD QL SMEAR: SLIGHT — SIGNIFICANT CHANGE UP
PHOSPHATE SERPL-MCNC: 4.7 MG/DL — HIGH (ref 2.5–4.5)
PHOSPHATE SERPL-MCNC: 4.9 MG/DL — HIGH (ref 2.5–4.5)
PLATELET # BLD AUTO: 90 K/UL — LOW (ref 150–400)
PLATELET COUNT - ESTIMATE: SIGNIFICANT CHANGE UP
PMV BLD: 13.3 FL — HIGH (ref 7–13)
POIKILOCYTOSIS BLD QL AUTO: SLIGHT — SIGNIFICANT CHANGE UP
POLYCHROMASIA BLD QL SMEAR: SLIGHT — SIGNIFICANT CHANGE UP
POTASSIUM SERPL-MCNC: 5.6 MMOL/L — HIGH (ref 3.5–5.3)
POTASSIUM SERPL-MCNC: 5.8 MMOL/L — HIGH (ref 3.5–5.3)
POTASSIUM SERPL-SCNC: 5.6 MMOL/L — HIGH (ref 3.5–5.3)
POTASSIUM SERPL-SCNC: 5.8 MMOL/L — HIGH (ref 3.5–5.3)
PROMYELOCYTES # FLD: 0 % — SIGNIFICANT CHANGE UP (ref 0–0)
PROT SERPL-MCNC: 5.6 G/DL — LOW (ref 6–8.3)
PROT SERPL-MCNC: 5.7 G/DL — LOW (ref 6–8.3)
PROT UR-MCNC: 51.6 MG/DL — SIGNIFICANT CHANGE UP
RBC # BLD: 2.81 M/UL — LOW (ref 3.8–5.2)
RBC # FLD: 15.8 % — HIGH (ref 10.3–14.5)
REVIEW TO FOLLOW: YES — SIGNIFICANT CHANGE UP
SODIUM SERPL-SCNC: 137 MMOL/L — SIGNIFICANT CHANGE UP (ref 135–145)
SODIUM SERPL-SCNC: 139 MMOL/L — SIGNIFICANT CHANGE UP (ref 135–145)
SODIUM UR-SCNC: 21 MMOL/L — SIGNIFICANT CHANGE UP
SPECIMEN SOURCE: SIGNIFICANT CHANGE UP
UUN UR-MCNC: 357.2 MG/DL — SIGNIFICANT CHANGE UP
VARIANT LYMPHS # BLD: 0.9 % — SIGNIFICANT CHANGE UP
WBC # BLD: 14.87 K/UL — HIGH (ref 3.8–10.5)
WBC # FLD AUTO: 14.87 K/UL — HIGH (ref 3.8–10.5)

## 2020-04-06 PROCEDURE — 99233 SBSQ HOSP IP/OBS HIGH 50: CPT

## 2020-04-06 PROCEDURE — 99233 SBSQ HOSP IP/OBS HIGH 50: CPT | Mod: GC

## 2020-04-06 RX ORDER — ONDANSETRON 8 MG/1
4 TABLET, FILM COATED ORAL ONCE
Refills: 0 | Status: COMPLETED | OUTPATIENT
Start: 2020-04-06 | End: 2020-04-08

## 2020-04-06 RX ORDER — DEXTROSE 50 % IN WATER 50 %
50 SYRINGE (ML) INTRAVENOUS ONCE
Refills: 0 | Status: COMPLETED | OUTPATIENT
Start: 2020-04-06 | End: 2020-04-06

## 2020-04-06 RX ORDER — PIPERACILLIN AND TAZOBACTAM 4; .5 G/20ML; G/20ML
3.38 INJECTION, POWDER, LYOPHILIZED, FOR SOLUTION INTRAVENOUS ONCE
Refills: 0 | Status: COMPLETED | OUTPATIENT
Start: 2020-04-06 | End: 2020-04-06

## 2020-04-06 RX ORDER — INSULIN HUMAN 100 [IU]/ML
5 INJECTION, SOLUTION SUBCUTANEOUS ONCE
Refills: 0 | Status: DISCONTINUED | OUTPATIENT
Start: 2020-04-06 | End: 2020-04-06

## 2020-04-06 RX ORDER — SODIUM ZIRCONIUM CYCLOSILICATE 10 G/10G
10 POWDER, FOR SUSPENSION ORAL ONCE
Refills: 0 | Status: COMPLETED | OUTPATIENT
Start: 2020-04-06 | End: 2020-04-06

## 2020-04-06 RX ORDER — CALCIUM GLUCONATE 100 MG/ML
1 VIAL (ML) INTRAVENOUS ONCE
Refills: 0 | Status: COMPLETED | OUTPATIENT
Start: 2020-04-06 | End: 2020-04-06

## 2020-04-06 RX ORDER — PIPERACILLIN AND TAZOBACTAM 4; .5 G/20ML; G/20ML
3.38 INJECTION, POWDER, LYOPHILIZED, FOR SOLUTION INTRAVENOUS EVERY 12 HOURS
Refills: 0 | Status: DISCONTINUED | OUTPATIENT
Start: 2020-04-06 | End: 2020-04-09

## 2020-04-06 RX ORDER — INSULIN HUMAN 100 [IU]/ML
5 INJECTION, SOLUTION SUBCUTANEOUS ONCE
Refills: 0 | Status: COMPLETED | OUTPATIENT
Start: 2020-04-06 | End: 2020-04-06

## 2020-04-06 RX ADMIN — Medication 5 UNIT(S): at 12:52

## 2020-04-06 RX ADMIN — Medication 20 MILLIGRAM(S): at 04:09

## 2020-04-06 RX ADMIN — PIPERACILLIN AND TAZOBACTAM 200 GRAM(S): 4; .5 INJECTION, POWDER, LYOPHILIZED, FOR SOLUTION INTRAVENOUS at 06:27

## 2020-04-06 RX ADMIN — INSULIN HUMAN 5 UNIT(S): 100 INJECTION, SOLUTION SUBCUTANEOUS at 00:49

## 2020-04-06 RX ADMIN — INSULIN GLARGINE 20 UNIT(S): 100 INJECTION, SOLUTION SUBCUTANEOUS at 22:13

## 2020-04-06 RX ADMIN — Medication 100 GRAM(S): at 00:50

## 2020-04-06 RX ADMIN — SODIUM ZIRCONIUM CYCLOSILICATE 10 GRAM(S): 10 POWDER, FOR SUSPENSION ORAL at 02:27

## 2020-04-06 RX ADMIN — Medication 5 UNIT(S): at 08:58

## 2020-04-06 RX ADMIN — Medication 50 MILLILITER(S): at 00:36

## 2020-04-06 RX ADMIN — INSULIN GLARGINE 20 UNIT(S): 100 INJECTION, SOLUTION SUBCUTANEOUS at 00:58

## 2020-04-06 RX ADMIN — Medication 5 UNIT(S): at 17:24

## 2020-04-06 RX ADMIN — PIPERACILLIN AND TAZOBACTAM 25 GRAM(S): 4; .5 INJECTION, POWDER, LYOPHILIZED, FOR SOLUTION INTRAVENOUS at 16:01

## 2020-04-06 RX ADMIN — HEPARIN SODIUM 5000 UNIT(S): 5000 INJECTION INTRAVENOUS; SUBCUTANEOUS at 16:01

## 2020-04-06 NOTE — PROGRESS NOTE ADULT - ASSESSMENT
Patient is a 74 y.o F w/ PMH HLD, HTN, insulin dependent T2DM, RA (on chronic prednisone and leflunomide) c/b bronchiectasis (on home O2 4 L NC) who is admitted with acute renal failure c/b hyperkalemia and acute on chronic diastolic heart failure. Urine output has improved.

## 2020-04-06 NOTE — PROGRESS NOTE ADULT - PROBLEM SELECTOR PLAN 2
Pt. with likely NBA in the setting of infection, low BP, and decreased oral intake. Pt. however with possibly CKD in the setting of long-standing history of DM and HTN. Pt. with normal Scr of 0.96 on 6/7/16, no recent labs. On admission (4/4/20), Scr was elevated to 2.82, which as worsened to 3.31 yesterday (4/5/20), repeat labs pending. Pt. hypoalbuminemic and with protein on UA. Hep B and Hep C negative, C3,C4 WNL. ANCAs, anti-GBM, serum immunofixation labs pending. Renal US without hydronephrosis, however with large complex cystic lesion in LUQ. Check urine electrolytes and spot urine TP/CR.  Monitor labs and urine output

## 2020-04-06 NOTE — PROGRESS NOTE ADULT - PROBLEM SELECTOR PLAN 1
Pt. initially presented with severe hyperkalemia in the setting of NBA. Pt. noted to have elevated serum potassium of 7.5 on admission (4/4/20). Received medical management but serum potassium remained 6.9. Patient went for urgent HD yesterday (4/5/20) for hyperkalemia. Repeat labs pending post HD. Patient noted to  Monitor serum potassium Pt. initially presented with severe hyperkalemia in the setting of NBA. Pt. noted to have elevated serum potassium of 7.5 on admission (4/4/20). Received medical management but serum potassium remained 6.9. Patient went for urgent HD yesterday (4/5/20) for hyperkalemia. Repeat labs pending post HD. Patient non oliguric now. Monitor urine output.  Monitor serum potassium

## 2020-04-06 NOTE — PROGRESS NOTE ADULT - PROBLEM SELECTOR PLAN 4
-Follow up Blood cultures   -UA with small LE   -CXR clear   -COVID PCR is negative, overall low suspicion for Covid -- will not repeat testing at present

## 2020-04-06 NOTE — PROGRESS NOTE ADULT - PROBLEM SELECTOR PLAN 1
P/w acute renal failure creatinine 3.15 (baseline 1.02 from 3/25) with hyperkalemia and oligouria  -Nephrology consulted- s/p line and getting HD   -f/u repeat BMP  -Strict I&Os  -avoid nephrotoxic agents  -Renal ultrasound showing large renal cyst on left upper quadrant   -f/u U Na, Ucreatinine, Uprotein/creatinine, and w/u for NBA

## 2020-04-06 NOTE — PROGRESS NOTE ADULT - SUBJECTIVE AND OBJECTIVE BOX
Medicine Progress note    Patient is a 74y old  Female who presents with a chief complaint of LE swelling (06 Apr 2020 11:22)      SUBJECTIVE / OVERNIGHT EVENTS: Pt seen/examined. Reports fatigue. Breathing stable. Continued diffuse edema but now with improvement in urine output. S/p HD this morning. No joint pain.     MEDICATIONS  (STANDING):  dextrose 5%. 1000 milliLiter(s) (50 mL/Hr) IV Continuous <Continuous>  dextrose 50% Injectable 12.5 Gram(s) IV Push once  dextrose 50% Injectable 25 Gram(s) IV Push once  dextrose 50% Injectable 25 Gram(s) IV Push once  heparin  Injectable 5000 Unit(s) SubCutaneous every 12 hours  insulin glargine Injectable (LANTUS) 20 Unit(s) SubCutaneous at bedtime  insulin lispro Injectable (HumaLOG) 5 Unit(s) SubCutaneous three times a day with meals  ondansetron Injectable 4 milliGRAM(s) IV Push once  piperacillin/tazobactam IVPB.. 3.375 Gram(s) IV Intermittent every 12 hours    MEDICATIONS  (PRN):  acetaminophen   Tablet .. 650 milliGRAM(s) Oral every 6 hours PRN Mild Pain (1 - 3), Moderate Pain (4 - 6), Severe Pain (7 - 10)  dextrose 40% Gel 15 Gram(s) Oral once PRN Blood Glucose LESS THAN 70 milliGRAM(s)/deciliter  glucagon  Injectable 1 milliGRAM(s) IntraMuscular once PRN Glucose LESS THAN 70 milligrams/deciliter  midodrine. 10 milliGRAM(s) Oral <User Schedule> PRN Pre-HD for Hypotension      CAPILLARY BLOOD GLUCOSE    POCT Blood Glucose.: 239 mg/dL (06 Apr 2020 16:21)  POCT Blood Glucose.: 313 mg/dL (06 Apr 2020 12:35)  POCT Blood Glucose.: 234 mg/dL (06 Apr 2020 08:37)  POCT Blood Glucose.: 220 mg/dL (06 Apr 2020 06:30)  POCT Blood Glucose.: 240 mg/dL (06 Apr 2020 04:05)  POCT Blood Glucose.: 226 mg/dL (06 Apr 2020 02:24)  POCT Blood Glucose.: 409 mg/dL (06 Apr 2020 01:13)  POCT Blood Glucose.: 232 mg/dL (06 Apr 2020 00:35)    I&O's Summary    05 Apr 2020 07:01  -  06 Apr 2020 07:00  --------------------------------------------------------  IN: 900 mL / OUT: 624 mL / NET: 276 mL    06 Apr 2020 07:01  -  06 Apr 2020 22:03  --------------------------------------------------------  IN: 120 mL / OUT: 425 mL / NET: -305 mL    PHYSICAL EXAM:  Vital Signs Last 24 Hrs  T(C): 36.4 (06 Apr 2020 04:08), Max: 36.4 (05 Apr 2020 22:40)  T(F): 97.5 (06 Apr 2020 04:08), Max: 97.5 (05 Apr 2020 22:40)  HR: 102 (06 Apr 2020 04:08) (102 - 138)  BP: 129/62 (06 Apr 2020 04:08) (120/55 - 131/56)  RR: 20 (06 Apr 2020 04:08) (20 - 22)  SpO2: 98% (06 Apr 2020 04:08) (98% - 100%)    GENERAL: not in distress, on room air  EYES: sclera clear b/l  CHEST/LUNG: normal respiratory effort, not tachypneic, speaking in full sentences without difficulty  HEART: Not tachycardic  ABDOMEN: Soft, Nontender, Nondistended  EXTREMITIES: 2-3+ pitting edema diffusely, Multiple erythematous weeping wound in B/L LE, scattered eccymoses   NEUROLOGY: awake, alert, responds to Qs appropriately, no gross deficits  PSYCH: calm, cooperative  SKIN: see above       LABS:                        7.7    14.87 )-----------( 90       ( 06 Apr 2020 11:53 )             26.7     04-06    137  |  101  |  98<H>  ----------------------------<  319<H>  5.8<H>   |  22  |  2.89<H>    Ca    8.7      06 Apr 2020 11:53  Phos  4.9     04-06  Mg     2.3     04-05    TPro  5.6<L>  /  Alb  1.9<L>  /  TBili  0.3  /  DBili  x   /  AST  32  /  ALT  32  /  AlkPhos  165<H>  04-06    PT/INR - ( 05 Apr 2020 06:20 )   PT: 13.0 SEC;   INR: 1.13       PTT - ( 05 Apr 2020 06:20 )  PTT:25.6 SEC    COVID-19 PCR: NotDetec (04-05-20 @ 02:56)    RADIOLOGY & ADDITIONAL TESTS:  Imaging from Last 24 Hours:    Electrocardiogram/QTc Interval:    Case discussed with: PA

## 2020-04-06 NOTE — PROGRESS NOTE ADULT - SUBJECTIVE AND OBJECTIVE BOX
[facility] DIVISION OF KIDNEY DISEASES AND HYPERTENSION -- FOLLOW UP NOTE  --------------------------------------------------------------------------------  75 yo F with RA c/b chronic bronchiectasis, HTN, and DM2 is admitted with SOB. Pt. currently COVID-19 rule out. Nephrology team is consulted for NBA and hyperkalemia. On admission (4/4/20), Scr was elevated to 2.82, which as worsened to 3.31 yesterday (4/5/20). Serum potassium was elevated to 7.5 on admission, and despite multiple rounds of medical management, remains elevated at 6.9, so patient was taken for urgent HD on 4/6/20. Patient tolerated 1 hour of HD, treatment was discontinued early due to tachycardia.     Patient evaluated at bedside, in no acute distress. Noted to be making urine. Repeat labs pending.    PAST HISTORY  --------------------------------------------------------------------------------  No significant changes to PMH, PSH, FHx, SHx, unless otherwise noted    ALLERGIES & MEDICATIONS  --------------------------------------------------------------------------------  Allergies    No Known Allergies    Intolerances      Standing Inpatient Medications  dextrose 5%. 1000 milliLiter(s) IV Continuous <Continuous>  dextrose 50% Injectable 12.5 Gram(s) IV Push once  dextrose 50% Injectable 25 Gram(s) IV Push once  dextrose 50% Injectable 25 Gram(s) IV Push once  heparin  Injectable 5000 Unit(s) SubCutaneous every 12 hours  insulin glargine Injectable (LANTUS) 20 Unit(s) SubCutaneous at bedtime  insulin lispro Injectable (HumaLOG) 5 Unit(s) SubCutaneous three times a day with meals  piperacillin/tazobactam IVPB.. 3.375 Gram(s) IV Intermittent every 12 hours  predniSONE   Tablet 20 milliGRAM(s) Oral daily    REVIEW OF SYSTEMS  --------------------------------------------------------------------------------  Gen: no fatigue  Respiratory: no dyspnea  CV: No chest pain  GI: + decreased oral intake  MSK: + LE edema  Neuro: No dizziness  Heme: No bleeding  Skin: + ruptured bulae/wound of right foot  Psych: No depression    All other systems were reviewed and are negative, except as noted.    VITALS/PHYSICAL EXAM  --------------------------------------------------------------------------------  T(C): 36.4 (04-06-20 @ 04:08), Max: 36.4 (04-05-20 @ 21:57)  HR: 102 (04-06-20 @ 04:08) (60 - 138)  BP: 129/62 (04-06-20 @ 04:08) (103/49 - 131/56)  RR: 20 (04-06-20 @ 04:08) (20 - 22)  SpO2: 98% (04-06-20 @ 04:08) (90% - 100%)  Wt(kg): --  Height (cm): 152.4 (04-05-20 @ 22:40)  Weight (kg): 111.6 (04-05-20 @ 22:40)  BMI (kg/m2): 48.1 (04-05-20 @ 22:40)  BSA (m2): 2.04 (04-05-20 @ 22:40)    04-05-20 @ 07:01  -  04-06-20 @ 07:00  --------------------------------------------------------  IN: 900 mL / OUT: 624 mL / NET: 276 mL    04-06-20 @ 07:01  -  04-06-20 @ 11:22  --------------------------------------------------------  IN: 120 mL / OUT: 200 mL / NET: -80 mL    Physical Exam:  	Gen: NAD  	HEENT: supple neck  	Pulm: diminished bibasilar breath sounds  	CV: RRR, S1S2; no rub  	Abd: +BS, soft, distended  	: + Ecvallos catheter   	LE: Pitting edema, ? lymphedema b/l  	Skin: Right dorsal foot wound  	Vascular access: LIJ non tunneled HD catheter    LABS/STUDIES  --------------------------------------------------------------------------------              8.0    13.22 >-----------<  159      [04-05-20 @ 06:20]              27.7     135  |  98  |  109  ----------------------------<  416      [04-05-20 @ 12:00]  6.7   |  22  |  3.21        Ca     10.4     [04-05-20 @ 12:00]      Mg     2.3     [04-05-20 @ 12:00]      Phos  6.6     [04-05-20 @ 12:00]    TPro  6.5  /  Alb  2.3  /  TBili  0.3  /  DBili  x   /  AST  20  /  ALT  27  /  AlkPhos  187  [04-04-20 @ 13:50]    PT/INR: PT 13.0 , INR 1.13       [04-05-20 @ 06:20]  PTT: 25.6       [04-05-20 @ 06:20]    Creatinine Trend:  SCr 3.21 [04-05 @ 12:00]  SCr 3.31 [04-05 @ 06:20]  SCr 3.10 [04-05 @ 03:11]  SCr 3.05 [04-05 @ 00:09]  SCr 3.15 [04-04 @ 21:00]    Urinalysis - [04-04-20 @ 21:00]      Color YELLOW / Appearance CLEAR / SG 1.019 / pH 6.0      Gluc NEGATIVE / Ketone NEGATIVE  / Bili NEGATIVE / Urobili 0.2       Blood NEGATIVE / Protein MODERATE / Leuk Est SMALL / Nitrite NEGATIVE      RBC NONE / WBC 2-5 / Hyaline  / Gran  / Sq Epi  / Non Sq Epi FEW / Bacteria MODERATE    Urine Protein 51.6      [04-05-20 @ 22:50]  Urine Sodium 21      [04-05-20 @ 22:50]  Urine Urea Nitrogen 357.2      [04-05-20 @ 22:50]

## 2020-04-07 LAB
-  AMIKACIN: SIGNIFICANT CHANGE UP
-  AMPICILLIN/SULBACTAM: SIGNIFICANT CHANGE UP
-  AMPICILLIN: SIGNIFICANT CHANGE UP
-  AZTREONAM: SIGNIFICANT CHANGE UP
-  CEFAZOLIN: SIGNIFICANT CHANGE UP
-  CEFEPIME: SIGNIFICANT CHANGE UP
-  CEFOXITIN: SIGNIFICANT CHANGE UP
-  CEFTRIAXONE: SIGNIFICANT CHANGE UP
-  CIPROFLOXACIN: SIGNIFICANT CHANGE UP
-  ERTAPENEM: SIGNIFICANT CHANGE UP
-  GENTAMICIN: SIGNIFICANT CHANGE UP
-  IMIPENEM: SIGNIFICANT CHANGE UP
-  LEVOFLOXACIN: SIGNIFICANT CHANGE UP
-  MEROPENEM: SIGNIFICANT CHANGE UP
-  PIPERACILLIN/TAZOBACTAM: SIGNIFICANT CHANGE UP
-  TOBRAMYCIN: SIGNIFICANT CHANGE UP
-  TRIMETHOPRIM/SULFAMETHOXAZOLE: SIGNIFICANT CHANGE UP
ALBUMIN SERPL ELPH-MCNC: 2 G/DL — LOW (ref 3.3–5)
ALBUMIN SERPL ELPH-MCNC: 2.3 G/DL — LOW (ref 3.3–5)
ALP SERPL-CCNC: 158 U/L — HIGH (ref 40–120)
ALP SERPL-CCNC: 186 U/L — HIGH (ref 40–120)
ALT FLD-CCNC: 24 U/L — SIGNIFICANT CHANGE UP (ref 4–33)
ALT FLD-CCNC: 30 U/L — SIGNIFICANT CHANGE UP (ref 4–33)
ANION GAP SERPL CALC-SCNC: 14 MMO/L — SIGNIFICANT CHANGE UP (ref 7–14)
ANION GAP SERPL CALC-SCNC: 16 MMO/L — HIGH (ref 7–14)
ANION GAP SERPL CALC-SCNC: 26 MMO/L — HIGH (ref 7–14)
APTT BLD: 25.8 SEC — LOW (ref 27.5–36.3)
AST SERPL-CCNC: 19 U/L — SIGNIFICANT CHANGE UP (ref 4–32)
AST SERPL-CCNC: 45 U/L — HIGH (ref 4–32)
BILIRUB SERPL-MCNC: 0.3 MG/DL — SIGNIFICANT CHANGE UP (ref 0.2–1.2)
BILIRUB SERPL-MCNC: 0.3 MG/DL — SIGNIFICANT CHANGE UP (ref 0.2–1.2)
BUN SERPL-MCNC: 106 MG/DL — HIGH (ref 7–23)
BUN SERPL-MCNC: 110 MG/DL — HIGH (ref 7–23)
BUN SERPL-MCNC: 111 MG/DL — HIGH (ref 7–23)
CALCIUM SERPL-MCNC: 7.6 MG/DL — LOW (ref 8.4–10.5)
CALCIUM SERPL-MCNC: 8.5 MG/DL — SIGNIFICANT CHANGE UP (ref 8.4–10.5)
CALCIUM SERPL-MCNC: 8.7 MG/DL — SIGNIFICANT CHANGE UP (ref 8.4–10.5)
CHLORIDE SERPL-SCNC: 101 MMOL/L — SIGNIFICANT CHANGE UP (ref 98–107)
CHLORIDE SERPL-SCNC: 104 MMOL/L — SIGNIFICANT CHANGE UP (ref 98–107)
CHLORIDE SERPL-SCNC: 106 MMOL/L — SIGNIFICANT CHANGE UP (ref 98–107)
CO2 SERPL-SCNC: 13 MMOL/L — LOW (ref 22–31)
CO2 SERPL-SCNC: 21 MMOL/L — LOW (ref 22–31)
CO2 SERPL-SCNC: 22 MMOL/L — SIGNIFICANT CHANGE UP (ref 22–31)
CREAT SERPL-MCNC: 2.97 MG/DL — HIGH (ref 0.5–1.3)
CREAT SERPL-MCNC: 2.99 MG/DL — HIGH (ref 0.5–1.3)
CREAT SERPL-MCNC: 3.07 MG/DL — HIGH (ref 0.5–1.3)
CRP SERPL-MCNC: 268.4 MG/L — HIGH
CULTURE RESULTS: SIGNIFICANT CHANGE UP
CULTURE RESULTS: SIGNIFICANT CHANGE UP
D DIMER BLD IA.RAPID-MCNC: 6275 NG/ML — SIGNIFICANT CHANGE UP
FERRITIN SERPL-MCNC: 815.9 NG/ML — HIGH (ref 15–150)
FERRITIN SERPL-MCNC: 820.3 NG/ML — HIGH (ref 15–150)
GLUCOSE SERPL-MCNC: 128 MG/DL — HIGH (ref 70–99)
GLUCOSE SERPL-MCNC: 129 MG/DL — HIGH (ref 70–99)
GLUCOSE SERPL-MCNC: 333 MG/DL — HIGH (ref 70–99)
HCT VFR BLD CALC: 23.2 % — LOW (ref 34.5–45)
HGB BLD-MCNC: 6.9 G/DL — CRITICAL LOW (ref 11.5–15.5)
INR BLD: 1.23 — HIGH (ref 0.88–1.17)
MAGNESIUM SERPL-MCNC: 2 MG/DL — SIGNIFICANT CHANGE UP (ref 1.6–2.6)
MAGNESIUM SERPL-MCNC: 2.1 MG/DL — SIGNIFICANT CHANGE UP (ref 1.6–2.6)
MCHC RBC-ENTMCNC: 28.2 PG — SIGNIFICANT CHANGE UP (ref 27–34)
MCHC RBC-ENTMCNC: 29.7 % — LOW (ref 32–36)
MCV RBC AUTO: 94.7 FL — SIGNIFICANT CHANGE UP (ref 80–100)
METHOD TYPE: SIGNIFICANT CHANGE UP
NRBC # FLD: 0.03 K/UL — SIGNIFICANT CHANGE UP (ref 0–0)
ORGANISM # SPEC MICROSCOPIC CNT: SIGNIFICANT CHANGE UP
PHOSPHATE SERPL-MCNC: 5 MG/DL — HIGH (ref 2.5–4.5)
PLATELET # BLD AUTO: 71 K/UL — LOW (ref 150–400)
PMV BLD: 14.1 FL — HIGH (ref 7–13)
POTASSIUM SERPL-MCNC: 5.6 MMOL/L — HIGH (ref 3.5–5.3)
POTASSIUM SERPL-MCNC: 5.7 MMOL/L — HIGH (ref 3.5–5.3)
POTASSIUM SERPL-MCNC: 6.8 MMOL/L — CRITICAL HIGH (ref 3.5–5.3)
POTASSIUM SERPL-SCNC: 5.6 MMOL/L — HIGH (ref 3.5–5.3)
POTASSIUM SERPL-SCNC: 5.7 MMOL/L — HIGH (ref 3.5–5.3)
POTASSIUM SERPL-SCNC: 6.8 MMOL/L — CRITICAL HIGH (ref 3.5–5.3)
PROCALCITONIN SERPL-MCNC: 5.65 NG/ML — HIGH (ref 0.02–0.1)
PROCALCITONIN SERPL-MCNC: 5.97 NG/ML — HIGH (ref 0.02–0.1)
PROCALCITONIN SERPL-MCNC: 9.04 NG/ML — HIGH (ref 0.02–0.1)
PROT SERPL-MCNC: 5.5 G/DL — LOW (ref 6–8.3)
PROT SERPL-MCNC: 5.7 G/DL — LOW (ref 6–8.3)
PROTHROM AB SERPL-ACNC: 14.1 SEC — HIGH (ref 9.8–13.1)
RBC # BLD: 2.45 M/UL — LOW (ref 3.8–5.2)
RBC # FLD: 15.9 % — HIGH (ref 10.3–14.5)
SODIUM SERPL-SCNC: 137 MMOL/L — SIGNIFICANT CHANGE UP (ref 135–145)
SODIUM SERPL-SCNC: 141 MMOL/L — SIGNIFICANT CHANGE UP (ref 135–145)
SODIUM SERPL-SCNC: 145 MMOL/L — SIGNIFICANT CHANGE UP (ref 135–145)
SPECIMEN SOURCE: SIGNIFICANT CHANGE UP
SPECIMEN SOURCE: SIGNIFICANT CHANGE UP
TROPONIN T, HIGH SENSITIVITY: 158 NG/L — CRITICAL HIGH (ref ?–14)
WBC # BLD: 17.75 K/UL — HIGH (ref 3.8–10.5)
WBC # FLD AUTO: 17.75 K/UL — HIGH (ref 3.8–10.5)

## 2020-04-07 PROCEDURE — 99255 IP/OBS CONSLTJ NEW/EST HI 80: CPT

## 2020-04-07 PROCEDURE — 99232 SBSQ HOSP IP/OBS MODERATE 35: CPT

## 2020-04-07 PROCEDURE — 99233 SBSQ HOSP IP/OBS HIGH 50: CPT

## 2020-04-07 PROCEDURE — 99233 SBSQ HOSP IP/OBS HIGH 50: CPT | Mod: GC

## 2020-04-07 RX ORDER — INSULIN GLARGINE 100 [IU]/ML
20 INJECTION, SOLUTION SUBCUTANEOUS AT BEDTIME
Refills: 0 | Status: DISCONTINUED | OUTPATIENT
Start: 2020-04-07 | End: 2020-04-07

## 2020-04-07 RX ORDER — INSULIN LISPRO 100/ML
VIAL (ML) SUBCUTANEOUS
Refills: 0 | Status: DISCONTINUED | OUTPATIENT
Start: 2020-04-07 | End: 2020-04-24

## 2020-04-07 RX ORDER — DEXTROSE 50 % IN WATER 50 %
15 SYRINGE (ML) INTRAVENOUS ONCE
Refills: 0 | Status: DISCONTINUED | OUTPATIENT
Start: 2020-04-07 | End: 2020-04-24

## 2020-04-07 RX ORDER — INSULIN LISPRO 100/ML
5 VIAL (ML) SUBCUTANEOUS
Refills: 0 | Status: DISCONTINUED | OUTPATIENT
Start: 2020-04-07 | End: 2020-04-07

## 2020-04-07 RX ORDER — DEXTROSE 50 % IN WATER 50 %
12.5 SYRINGE (ML) INTRAVENOUS ONCE
Refills: 0 | Status: DISCONTINUED | OUTPATIENT
Start: 2020-04-07 | End: 2020-04-24

## 2020-04-07 RX ORDER — DEXTROSE 50 % IN WATER 50 %
25 SYRINGE (ML) INTRAVENOUS ONCE
Refills: 0 | Status: DISCONTINUED | OUTPATIENT
Start: 2020-04-07 | End: 2020-04-07

## 2020-04-07 RX ORDER — INSULIN GLARGINE 100 [IU]/ML
20 INJECTION, SOLUTION SUBCUTANEOUS AT BEDTIME
Refills: 0 | Status: DISCONTINUED | OUTPATIENT
Start: 2020-04-07 | End: 2020-04-10

## 2020-04-07 RX ORDER — DEXTROSE 50 % IN WATER 50 %
12.5 SYRINGE (ML) INTRAVENOUS ONCE
Refills: 0 | Status: DISCONTINUED | OUTPATIENT
Start: 2020-04-07 | End: 2020-04-07

## 2020-04-07 RX ORDER — INSULIN LISPRO 100/ML
VIAL (ML) SUBCUTANEOUS
Refills: 0 | Status: DISCONTINUED | OUTPATIENT
Start: 2020-04-07 | End: 2020-04-07

## 2020-04-07 RX ORDER — DEXTROSE 50 % IN WATER 50 %
15 SYRINGE (ML) INTRAVENOUS ONCE
Refills: 0 | Status: COMPLETED | OUTPATIENT
Start: 2020-04-07 | End: 2020-04-07

## 2020-04-07 RX ORDER — DEXTROSE 50 % IN WATER 50 %
15 SYRINGE (ML) INTRAVENOUS ONCE
Refills: 0 | Status: DISCONTINUED | OUTPATIENT
Start: 2020-04-07 | End: 2020-04-07

## 2020-04-07 RX ORDER — INSULIN LISPRO 100/ML
5 VIAL (ML) SUBCUTANEOUS
Refills: 0 | Status: DISCONTINUED | OUTPATIENT
Start: 2020-04-07 | End: 2020-04-10

## 2020-04-07 RX ADMIN — Medication 5 UNIT(S): at 17:35

## 2020-04-07 RX ADMIN — Medication 5 UNIT(S): at 09:43

## 2020-04-07 RX ADMIN — PIPERACILLIN AND TAZOBACTAM 25 GRAM(S): 4; .5 INJECTION, POWDER, LYOPHILIZED, FOR SOLUTION INTRAVENOUS at 04:29

## 2020-04-07 RX ADMIN — Medication 5 UNIT(S): at 12:15

## 2020-04-07 RX ADMIN — Medication 17.5 MILLIGRAM(S): at 04:29

## 2020-04-07 RX ADMIN — HEPARIN SODIUM 5000 UNIT(S): 5000 INJECTION INTRAVENOUS; SUBCUTANEOUS at 04:29

## 2020-04-07 RX ADMIN — Medication 650 MILLIGRAM(S): at 16:28

## 2020-04-07 RX ADMIN — Medication 15 GRAM(S): at 22:30

## 2020-04-07 RX ADMIN — PIPERACILLIN AND TAZOBACTAM 25 GRAM(S): 4; .5 INJECTION, POWDER, LYOPHILIZED, FOR SOLUTION INTRAVENOUS at 16:27

## 2020-04-07 RX ADMIN — HEPARIN SODIUM 5000 UNIT(S): 5000 INJECTION INTRAVENOUS; SUBCUTANEOUS at 16:27

## 2020-04-07 NOTE — PROGRESS NOTE ADULT - SUBJECTIVE AND OBJECTIVE BOX
[facility] DIVISION OF KIDNEY DISEASES AND HYPERTENSION -- FOLLOW UP NOTE  --------------------------------------------------------------------------------  73 yo F with RA c/b chronic bronchiectasis, HTN, and DM2 is admitted with SOB. Pt. currently COVID-19 rule out. Nephrology team is consulted for NBA and hyperkalemia. On admission (4/4/20), Scr was elevated to 2.82, which as worsened to 3.31 on 4/5/20. Serum potassium was elevated to 7.5 on admission, and despite multiple rounds of medical management, remained elevated at 6.9, so patient was taken for urgent HD on 4/6/20. Patient tolerated 1 hour of HD, treatment was discontinued early due to tachycardia.     Patient evaluated at bedside, in no distress. Noted to be hyperkalemic to 5.6 and with a BUN of 110, will arrange for HD today.    PAST HISTORY  --------------------------------------------------------------------------------  No significant changes to PMH, PSH, FHx, SHx, unless otherwise noted    ALLERGIES & MEDICATIONS  --------------------------------------------------------------------------------  Allergies    No Known Allergies    Intolerances      Standing Inpatient Medications  dextrose 5%. 1000 milliLiter(s) IV Continuous <Continuous>  dextrose 50% Injectable 12.5 Gram(s) IV Push once  heparin  Injectable 5000 Unit(s) SubCutaneous every 12 hours  insulin glargine Injectable (LANTUS) 20 Unit(s) SubCutaneous at bedtime  insulin lispro (HumaLOG) corrective regimen sliding scale   SubCutaneous Before meals and at bedtime  insulin lispro Injectable (HumaLOG) 5 Unit(s) SubCutaneous three times a day before meals  ondansetron Injectable 4 milliGRAM(s) IV Push once  piperacillin/tazobactam IVPB.. 3.375 Gram(s) IV Intermittent every 12 hours  predniSONE   Tablet 17.5 milliGRAM(s) Oral daily    REVIEW OF SYSTEMS  --------------------------------------------------------------------------------  Gen: no fatigue  Respiratory: no dyspnea  CV: No chest pain  GI: + decreased oral intake  MSK: + LE edema  Neuro: No dizziness  Heme: No bleeding  Skin: + ruptured bulae/wound of right foot  Psych: No depression    All other systems were reviewed and are negative, except as noted.    VITALS/PHYSICAL EXAM  --------------------------------------------------------------------------------  T(C): 36.7 (04-07-20 @ 11:30), Max: 36.7 (04-07-20 @ 11:30)  HR: 112 (04-07-20 @ 11:30) (100 - 112)  BP: 108/44 (04-07-20 @ 11:30) (108/44 - 123/55)  RR: 20 (04-07-20 @ 11:30) (19 - 20)  SpO2: 100% (04-07-20 @ 11:30) (100% - 100%)  Wt(kg): --  Height (cm): 152.4 (04-05-20 @ 22:40)  Weight (kg): 111.6 (04-05-20 @ 22:40)  BMI (kg/m2): 48.1 (04-05-20 @ 22:40)  BSA (m2): 2.04 (04-05-20 @ 22:40)    04-06-20 @ 07:01  -  04-07-20 @ 07:00  --------------------------------------------------------  IN: 460 mL / OUT: 425 mL / NET: 35 mL    04-07-20 @ 07:01  -  04-07-20 @ 13:35  --------------------------------------------------------  IN: 0 mL / OUT: 425 mL / NET: -425 mL    Physical Exam:  	Gen: NAD  	HEENT: supple neck  	Pulm: diminished bibasilar breath sounds  	CV: RRR, S1S2; no rub  	Abd: +BS, soft, distended  	: + Cevallos catheter   	LE: Pitting edema, ? lymphedema b/l  	Skin: Right dorsal foot wound  	Vascular access: LIJ non tunneled HD catheter    LABS/STUDIES  --------------------------------------------------------------------------------              6.9    17.75 >-----------<  71       [04-07-20 @ 10:15]              23.2     137  |  101  |  110  ----------------------------<  128      [04-07-20 @ 10:15]  5.6   |  22  |  2.99        Ca     8.5     [04-07-20 @ 10:15]      Mg     2.0     [04-07-20 @ 10:15]      Phos  5.0     [04-07-20 @ 10:15]    TPro  5.7  /  Alb  2.0  /  TBili  0.3  /  DBili  x   /  AST  19  /  ALT  24  /  AlkPhos  158  [04-07-20 @ 10:15]    PT/INR: PT 14.1 , INR 1.23       [04-07-20 @ 10:15]  PTT: 25.8       [04-07-20 @ 10:15]    Creatinine Trend:  SCr 2.99 [04-07 @ 10:15]  SCr 3.06 [04-06 @ 21:26]  SCr 2.89 [04-06 @ 11:53]  SCr 3.21 [04-05 @ 12:00]  SCr 3.31 [04-05 @ 06:20]    Urinalysis - [04-04-20 @ 21:00]      Color YELLOW / Appearance CLEAR / SG 1.019 / pH 6.0      Gluc NEGATIVE / Ketone NEGATIVE  / Bili NEGATIVE / Urobili 0.2       Blood NEGATIVE / Protein MODERATE / Leuk Est SMALL / Nitrite NEGATIVE      RBC NONE / WBC 2-5 / Hyaline  / Gran  / Sq Epi  / Non Sq Epi FEW / Bacteria MODERATE    Urine Protein 51.6      [04-05-20 @ 22:50]  Urine Sodium 21      [04-05-20 @ 22:50]  Urine Urea Nitrogen 357.2      [04-05-20 @ 22:50]    Ferritin 815.9      [04-07-20 @ 10:15]  HbA1c 6.4      [04-05-20 @ 06:20]    HBsAg NEGATIVE      [04-05-20 @ 12:00]  HCV 0.21, Nonreactive Hepatitis C AB

## 2020-04-07 NOTE — CONSULT NOTE ADULT - SUBJECTIVE AND OBJECTIVE BOX
"HPI: Patient is a 74 y.o F w/ PMH HLD, HTN, insulin dependent T2DM, RA (on chronic prednisone, leflunomide, and hydroxychloroquine) c/b bronchiectasis (on home O2 4 L NC) who p/w worsening LE swelling. She states that about two weeks ago, she started experiencing weakness of her LE 2/2 her RA. Her prednisone dose was increased to 20 mg but she started to notice worsening LE swelling and SOB. She admits to orthopnea. She called her PMD this week who started her on lasix 20 daily w/ some improvement in her LE swelling but she continued to have SOB. She also admit to one watery BM a day which is unusual for her. Her PMD advised her to go to the ED. She denies any CP, palpitations, fever/chills, cough, sick contacts, or recent travel.     In the ED, vitals Tmax 98.8 HR 93-99 BP 99/40 -134/97 RR 20-28 SPO2 % on 4-5 L NC. Notable labs: WBC 16.61, k+ 7.5, creatinine 2.82. s/p (04 Apr 2020 23:59)"    Above reviewed. Patient with known history RA on prednisone, Hydroxychloroquine, on home O2 presenting with LE weakness. Here, patient with LE and shortness of breath. No fevers, no chills, patient then found to have positive BCXs with E coli.     PAST MEDICAL & SURGICAL HISTORY:  Hyperlipidemia  Bronchiectasis  Type 2 diabetes mellitus  Hypertension  Rheumatoid arthritis  No significant past surgical history    Allergies    No Known Allergies    ANTIMICROBIALS:  piperacillin/tazobactam IVPB.. 3.375 every 12 hours    OTHER MEDS:  acetaminophen   Tablet .. 650 milliGRAM(s) Oral every 6 hours PRN  dextrose 40% Gel 15 Gram(s) Oral once PRN  dextrose 5%. 1000 milliLiter(s) IV Continuous <Continuous>  dextrose 50% Injectable 12.5 Gram(s) IV Push once  glucagon  Injectable 1 milliGRAM(s) IntraMuscular once PRN  heparin  Injectable 5000 Unit(s) SubCutaneous every 12 hours  insulin glargine Injectable (LANTUS) 20 Unit(s) SubCutaneous at bedtime  insulin lispro (HumaLOG) corrective regimen sliding scale   SubCutaneous Before meals and at bedtime  insulin lispro Injectable (HumaLOG) 5 Unit(s) SubCutaneous three times a day before meals  midodrine. 10 milliGRAM(s) Oral <User Schedule> PRN  ondansetron Injectable 4 milliGRAM(s) IV Push once  predniSONE   Tablet 17.5 milliGRAM(s) Oral daily    SOCIAL HISTORY: No tobacco, no alcohol, no illicit drugs    FAMILY HISTORY:  No pertinent family history in first degree relatives relating to chief complaint    Drug Dosing Weight  Height (cm): 152.4 (05 Apr 2020 22:40)  Weight (kg): 111.6 (05 Apr 2020 22:40)  BMI (kg/m2): 48.1 (05 Apr 2020 22:40)  BSA (m2): 2.04 (05 Apr 2020 22:40)    PE:    Vital Signs Last 24 Hrs  T(C): 36.7 (07 Apr 2020 11:30), Max: 36.7 (07 Apr 2020 11:30)  T(F): 98.1 (07 Apr 2020 11:30), Max: 98.1 (07 Apr 2020 11:30)  HR: 112 (07 Apr 2020 11:30) (100 - 112)  BP: 108/44 (07 Apr 2020 11:30) (108/44 - 123/55)  RR: 20 (07 Apr 2020 11:30) (19 - 20)  SpO2: 100% (07 Apr 2020 11:30) (100% - 100%)    Gen: AOx3, NAD, non-toxic, pleasant  CV: S1+S2 normal, nontachycardic  Resp: Clear bilat, no resp distress, no crackles/wheezes  Abd: Soft, nontender, +BS  Ext: No LE edema, no wounds  : No Cevallos  IV/Skin: No thrombophlebitis  Msk: No low back pain, no arthralgias, no joint swelling  Neuro: No sensory deficits, no motor deficits    LABS:                        6.9    17.75 )-----------( 73       ( 07 Apr 2020 10:15 )             23.2     04-07    137  |  101  |  110<H>  ----------------------------<  128<H>  5.6<H>   |  22  |  2.99<H>    Ca    8.5      07 Apr 2020 10:15  Phos  5.0     04-07  Mg     2.0     04-07    TPro  5.7<L>  /  Alb  2.0<L>  /  TBili  0.3  /  DBili  x   /  AST  19  /  ALT  24  /  AlkPhos  158<H>  04-07    MICROBIOLOGY:    .Blood Blood-Venous  04-05-20   Growth in aerobic and anaerobic bottles: Escherichia coli  See previous culture [id number]  --    Growth in aerobic bottle: Gram Negative Rods  Growth in anaerobic bottle: Gram Negative Rods    .Blood Blood-Peripheral  04-05-20   Growth in aerobic and anaerobic bottles: Escherichia coli Susceptibility  to follow.    (otherwise reviewed)    RADIOLOGY:    4/5 CXR:    FINDINGS:    Lines and Tubes: A catheter via left-sided approach is in the SVC.    Lungs: Bilateral lower lung opacities.    Pleura: Bilateral pleural effusions, left greater than right.    Heart and Mediastinum: Not well evaluated.    Skeletal: Unremarkable.    IMPRESSION:    1.  Catheter in the SVC. "HPI: Patient is a 74 y.o F w/ PMH HLD, HTN, insulin dependent T2DM, RA (on chronic prednisone, leflunomide, and hydroxychloroquine) c/b bronchiectasis (on home O2 4 L NC) who p/w worsening LE swelling. She states that about two weeks ago, she started experiencing weakness of her LE 2/2 her RA. Her prednisone dose was increased to 20 mg but she started to notice worsening LE swelling and SOB. She admits to orthopnea. She called her PMD this week who started her on lasix 20 daily w/ some improvement in her LE swelling but she continued to have SOB. She also admit to one watery BM a day which is unusual for her. Her PMD advised her to go to the ED. She denies any CP, palpitations, fever/chills, cough, sick contacts, or recent travel.     In the ED, vitals Tmax 98.8 HR 93-99 BP 99/40 -134/97 RR 20-28 SPO2 % on 4-5 L NC. Notable labs: WBC 16.61, k+ 7.5, creatinine 2.82. s/p (04 Apr 2020 23:59)"    Above reviewed. Patient with known history RA on prednisone, Hydroxychloroquine, on home O2 presenting with LE weakness. Here, patient with LE and shortness of breath. No fevers, no chills, patient then found to have positive BCXs with E coli. Patient specifically with no abd pain. No N/V/D. No dysuria, no pyuria. No other new complaints. ID eval for GNR bacteremia.    PAST MEDICAL & SURGICAL HISTORY:  Hyperlipidemia  Bronchiectasis  Type 2 diabetes mellitus  Hypertension  Rheumatoid arthritis  No significant past surgical history    Allergies    No Known Allergies    ANTIMICROBIALS:  piperacillin/tazobactam IVPB.. 3.375 every 12 hours    OTHER MEDS:  acetaminophen   Tablet .. 650 milliGRAM(s) Oral every 6 hours PRN  dextrose 40% Gel 15 Gram(s) Oral once PRN  dextrose 5%. 1000 milliLiter(s) IV Continuous <Continuous>  dextrose 50% Injectable 12.5 Gram(s) IV Push once  glucagon  Injectable 1 milliGRAM(s) IntraMuscular once PRN  heparin  Injectable 5000 Unit(s) SubCutaneous every 12 hours  insulin glargine Injectable (LANTUS) 20 Unit(s) SubCutaneous at bedtime  insulin lispro (HumaLOG) corrective regimen sliding scale   SubCutaneous Before meals and at bedtime  insulin lispro Injectable (HumaLOG) 5 Unit(s) SubCutaneous three times a day before meals  midodrine. 10 milliGRAM(s) Oral <User Schedule> PRN  ondansetron Injectable 4 milliGRAM(s) IV Push once  predniSONE   Tablet 17.5 milliGRAM(s) Oral daily    SOCIAL HISTORY: No tobacco, no alcohol, no illicit drugs    FAMILY HISTORY:  No pertinent family history in first degree relatives relating to chief complaint    Drug Dosing Weight  Height (cm): 152.4 (05 Apr 2020 22:40)  Weight (kg): 111.6 (05 Apr 2020 22:40)  BMI (kg/m2): 48.1 (05 Apr 2020 22:40)  BSA (m2): 2.04 (05 Apr 2020 22:40)    PE:    Vital Signs Last 24 Hrs  T(C): 36.7 (07 Apr 2020 11:30), Max: 36.7 (07 Apr 2020 11:30)  T(F): 98.1 (07 Apr 2020 11:30), Max: 98.1 (07 Apr 2020 11:30)  HR: 112 (07 Apr 2020 11:30) (100 - 112)  BP: 108/44 (07 Apr 2020 11:30) (108/44 - 123/55)  RR: 20 (07 Apr 2020 11:30) (19 - 20)  SpO2: 100% (07 Apr 2020 11:30) (100% - 100%)    Gen: AOx3, NAD, fatigued  CV: S1+S2 normal, nontachycardic  Resp: Clear bilat, no resp distress, no crackles/wheezes  Abd: Soft, nontender, +BS  Ext: No LE edema, no wounds  : No Cevallos  IV/Skin: L GRACE cabrera  Msk: No low back pain, no arthralgias, no joint swelling  Neuro: No sensory deficits, no motor deficits    LABS:                        6.9    17.75 )-----------( 71       ( 07 Apr 2020 10:15 )             23.2     04-07    137  |  101  |  110<H>  ----------------------------<  128<H>  5.6<H>   |  22  |  2.99<H>    Ca    8.5      07 Apr 2020 10:15  Phos  5.0     04-07  Mg     2.0     04-07    TPro  5.7<L>  /  Alb  2.0<L>  /  TBili  0.3  /  DBili  x   /  AST  19  /  ALT  24  /  AlkPhos  158<H>  04-07    MICROBIOLOGY:    .Blood Blood-Venous  04-05-20   Growth in aerobic and anaerobic bottles: Escherichia coli  See previous culture [id number]  --    Growth in aerobic bottle: Gram Negative Rods  Growth in anaerobic bottle: Gram Negative Rods    .Blood Blood-Peripheral  04-05-20   Growth in aerobic and anaerobic bottles: Escherichia coli Susceptibility  to follow.    (otherwise reviewed)    RADIOLOGY:    4/5 CXR:    FINDINGS:    Lines and Tubes: A catheter via left-sided approach is in the SVC.    Lungs: Bilateral lower lung opacities.    Pleura: Bilateral pleural effusions, left greater than right.    Heart and Mediastinum: Not well evaluated.    Skeletal: Unremarkable.    IMPRESSION:    1.  Catheter in the SVC.

## 2020-04-07 NOTE — CONSULT NOTE ADULT - ASSESSMENT
75 yo F w/ PMH HLD, HTN, insulin dependent T2DM, RA (on chronic prednisone, leflunomide, and hydroxychloroquine) c/b bronchiectasis (on home O2 4 L NC) who p/w worsening LE swelling  E coli bacteremia    Continue Zosyn    Full note to follow 73 yo F w/ PMH HLD, HTN, insulin dependent T2DM, RA (on chronic prednisone, leflunomide, and hydroxychloroquine) c/b bronchiectasis (on home O2 4 L NC) who p/w worsening LE swelling  Leukocytosis, no fever  E coli bacteremia, unclear source, S pending  COVID neg, CXR clear, no fevers--would not retest as we have alternate explanation for inflammatory response  Overall,  1) E coli bacteremia  - Zosyn 3.375g q 12 (if clinical worsening, change to Ertapenem, otherwise will narrow based on S pattern)  - Repeat BCX  - Would check CT A/P to evaluate for source of bacteremia  - Check strongy ab in setting of chronic steroid use  2) R/O COVID  - CXR clear, no fevers, COVID PCR neg, lower suspicion for COVID  - As long as infection control in agreement can DC isolation and not check second test  3) Leukocytosis  - Likely due to bacteremia, trend to normal  - Monitor for alternate cause    Oral Peacock MD  Pager [phone]  After 5pm and on weekends call [phone]

## 2020-04-07 NOTE — PROGRESS NOTE ADULT - PROBLEM SELECTOR PLAN 2
Pt. with likely NBA in the setting of infection, low BP, and decreased oral intake. Pt. however with possibly CKD in the setting of long-standing history of DM and HTN. Pt. with normal Scr of 0.96 on 6/7/16, no recent labs. On admission (4/4/20), Scr was elevated to 2.82, which worsened to 3.31 on 4/5/20, now 2.99 after 1 hour of HD. Pt. hypoalbuminemic and with protein on UA. Hep B and Hep C negative, C3,C4 WNL. ANCAs, anti-GBM, serum immunofixation labs pending. Renal US without hydronephrosis, however with large complex cystic lesion in LUQ. Check urine electrolytes and spot urine TP/CR.  Monitor labs and urine output    If any questions, please feel free to contact me  Oliver Ricci   Nephrology Fellow  [phone]  (After 5 pm or on weekends please page the on-call fellow)

## 2020-04-07 NOTE — PROGRESS NOTE ADULT - SUBJECTIVE AND OBJECTIVE BOX
Blood cultures positive for E. Coli    Vital Signs Last 24 Hrs  T(C): 36.7 (07 Apr 2020 11:30), Max: 36.7 (07 Apr 2020 11:30)  T(F): 98.1 (07 Apr 2020 11:30), Max: 98.1 (07 Apr 2020 11:30)  HR: 112 (07 Apr 2020 11:30) (100 - 112)  BP: 108/44 (07 Apr 2020 11:30) (108/44 - 123/55)  RR: 19 (07 Apr 2020 15:30) (19 - 20)  SpO2: 100% (07 Apr 2020 15:30) (100% - 100%)    See physical exam documented 4/6/2020.    04-07    137  |  101  |  110<H>  ----------------------------<  128<H>  5.6<H>   |  22  |  2.99<H>    Ca    8.5      07 Apr 2020 10:15  Phos  5.0     04-07  Mg     2.0     04-07    TPro  5.7<L>  /  Alb  2.0<L>  /  TBili  0.3  /  DBili  x   /  AST  19  /  ALT  24  /  AlkPhos  158<H>  04-07                          6.9    17.75 )-----------( 71       ( 07 Apr 2020 10:15 )             23.2     MEDICATIONS  (STANDING):  dextrose 5%. 1000 milliLiter(s) (50 mL/Hr) IV Continuous <Continuous>  dextrose 50% Injectable 12.5 Gram(s) IV Push once  heparin  Injectable 5000 Unit(s) SubCutaneous every 12 hours  insulin glargine Injectable (LANTUS) 20 Unit(s) SubCutaneous at bedtime  insulin lispro (HumaLOG) corrective regimen sliding scale   SubCutaneous Before meals and at bedtime  insulin lispro Injectable (HumaLOG) 5 Unit(s) SubCutaneous three times a day before meals  ondansetron Injectable 4 milliGRAM(s) IV Push once  piperacillin/tazobactam IVPB.. 3.375 Gram(s) IV Intermittent every 12 hours  predniSONE   Tablet 17.5 milliGRAM(s) Oral daily      74W  HTN  DLD  DM  RA  (on chronic prednisone and leflunomide) - resume hydroxychloroquine 200 bid  Bronchiectasis (on home O2 4 L NC)   Admitted with NBA c/b hyperkalemia - HD today  Found to have E. coli bacteremia - pip/tazo  Large indeterminate complex cystic mass in the left upper quadrant - obtain CT A/P without contrast given NBA

## 2020-04-07 NOTE — PROGRESS NOTE ADULT - PROBLEM SELECTOR PLAN 1
Pt. initially presented with severe hyperkalemia in the setting of NBA. Pt. noted to have elevated serum potassium of 7.5 on admission (4/4/20). Received medical management but serum potassium remained 6.9. Patient went for urgent HD 4/6/20. Patient non oliguric now, but remains hyperkalemic with poor clearance. Will arrange for HD again today. Monitor urine output.  Monitor serum potassium

## 2020-04-08 DIAGNOSIS — M06.9 RHEUMATOID ARTHRITIS, UNSPECIFIED: ICD-10-CM

## 2020-04-08 DIAGNOSIS — K86.89 OTHER SPECIFIED DISEASES OF PANCREAS: ICD-10-CM

## 2020-04-08 DIAGNOSIS — D50.0 IRON DEFICIENCY ANEMIA SECONDARY TO BLOOD LOSS (CHRONIC): ICD-10-CM

## 2020-04-08 DIAGNOSIS — I10 ESSENTIAL (PRIMARY) HYPERTENSION: ICD-10-CM

## 2020-04-08 DIAGNOSIS — R78.81 BACTEREMIA: ICD-10-CM

## 2020-04-08 DIAGNOSIS — E11.29 TYPE 2 DIABETES MELLITUS WITH OTHER DIABETIC KIDNEY COMPLICATION: ICD-10-CM

## 2020-04-08 DIAGNOSIS — N17.9 ACUTE KIDNEY FAILURE, UNSPECIFIED: ICD-10-CM

## 2020-04-08 LAB
ANION GAP SERPL CALC-SCNC: 12 MMO/L — SIGNIFICANT CHANGE UP (ref 7–14)
BLD GP AB SCN SERPL QL: NEGATIVE — SIGNIFICANT CHANGE UP
BUN SERPL-MCNC: 106 MG/DL — HIGH (ref 7–23)
CALCIUM SERPL-MCNC: 8.5 MG/DL — SIGNIFICANT CHANGE UP (ref 8.4–10.5)
CHLORIDE SERPL-SCNC: 105 MMOL/L — SIGNIFICANT CHANGE UP (ref 98–107)
CHLORIDE UR-SCNC: < 20 MMOL/L — SIGNIFICANT CHANGE UP
CO2 SERPL-SCNC: 23 MMOL/L — SIGNIFICANT CHANGE UP (ref 22–31)
CREAT ?TM UR-MCNC: 68.1 MG/DL — SIGNIFICANT CHANGE UP
CREAT SERPL-MCNC: 2.94 MG/DL — HIGH (ref 0.5–1.3)
GLUCOSE BLDC GLUCOMTR-MCNC: 115 MG/DL — HIGH (ref 70–99)
GLUCOSE BLDC GLUCOMTR-MCNC: 124 MG/DL — HIGH (ref 70–99)
GLUCOSE BLDC GLUCOMTR-MCNC: 159 MG/DL — HIGH (ref 70–99)
GLUCOSE BLDC GLUCOMTR-MCNC: 61 MG/DL — LOW (ref 70–99)
GLUCOSE BLDC GLUCOMTR-MCNC: 66 MG/DL — LOW (ref 70–99)
GLUCOSE BLDC GLUCOMTR-MCNC: 88 MG/DL — SIGNIFICANT CHANGE UP (ref 70–99)
GLUCOSE BLDC GLUCOMTR-MCNC: 89 MG/DL — SIGNIFICANT CHANGE UP (ref 70–99)
GLUCOSE SERPL-MCNC: 87 MG/DL — SIGNIFICANT CHANGE UP (ref 70–99)
HCT VFR BLD CALC: 24.6 % — LOW (ref 34.5–45)
MAGNESIUM SERPL-MCNC: 2 MG/DL — SIGNIFICANT CHANGE UP (ref 1.6–2.6)
MCHC RBC-ENTMCNC: 26.8 % — LOW (ref 32–36)
MCHC RBC-ENTMCNC: 28.4 PG — SIGNIFICANT CHANGE UP (ref 27–34)
MCV RBC AUTO: 106 FL — HIGH (ref 80–100)
NRBC # FLD: 0.02 K/UL — SIGNIFICANT CHANGE UP (ref 0–0)
PHOSPHATE SERPL-MCNC: 5.1 MG/DL — HIGH (ref 2.5–4.5)
PLATELET # BLD AUTO: 65 K/UL — LOW (ref 150–400)
PMV BLD: 13.5 FL — HIGH (ref 7–13)
POTASSIUM SERPL-MCNC: 6 MMOL/L — HIGH (ref 3.5–5.3)
POTASSIUM SERPL-SCNC: 6 MMOL/L — HIGH (ref 3.5–5.3)
POTASSIUM UR-SCNC: 39.1 MMOL/L — SIGNIFICANT CHANGE UP
PROT UR-MCNC: 41.9 MG/DL — SIGNIFICANT CHANGE UP
RBC # BLD: 2.32 M/UL — LOW (ref 3.8–5.2)
RBC # FLD: 16.1 % — HIGH (ref 10.3–14.5)
RH IG SCN BLD-IMP: POSITIVE — SIGNIFICANT CHANGE UP
SODIUM SERPL-SCNC: 140 MMOL/L — SIGNIFICANT CHANGE UP (ref 135–145)
SODIUM UR-SCNC: < 20 MMOL/L — SIGNIFICANT CHANGE UP
WBC # BLD: 18.96 K/UL — HIGH (ref 3.8–10.5)
WBC # FLD AUTO: 18.96 K/UL — HIGH (ref 3.8–10.5)

## 2020-04-08 PROCEDURE — 93306 TTE W/DOPPLER COMPLETE: CPT | Mod: 26

## 2020-04-08 PROCEDURE — 99233 SBSQ HOSP IP/OBS HIGH 50: CPT

## 2020-04-08 PROCEDURE — 74176 CT ABD & PELVIS W/O CONTRAST: CPT | Mod: 26

## 2020-04-08 RX ORDER — SODIUM ZIRCONIUM CYCLOSILICATE 10 G/10G
10 POWDER, FOR SUSPENSION ORAL ONCE
Refills: 0 | Status: COMPLETED | OUTPATIENT
Start: 2020-04-08 | End: 2020-04-08

## 2020-04-08 RX ORDER — SODIUM BICARBONATE 1 MEQ/ML
50 SYRINGE (ML) INTRAVENOUS ONCE
Refills: 0 | Status: COMPLETED | OUTPATIENT
Start: 2020-04-08 | End: 2020-04-08

## 2020-04-08 RX ORDER — INSULIN HUMAN 100 [IU]/ML
10 INJECTION, SOLUTION SUBCUTANEOUS ONCE
Refills: 0 | Status: COMPLETED | OUTPATIENT
Start: 2020-04-08 | End: 2020-04-08

## 2020-04-08 RX ORDER — DEXTROSE 50 % IN WATER 50 %
50 SYRINGE (ML) INTRAVENOUS ONCE
Refills: 0 | Status: COMPLETED | OUTPATIENT
Start: 2020-04-08 | End: 2020-04-08

## 2020-04-08 RX ORDER — DEXTROSE 50 % IN WATER 50 %
15 SYRINGE (ML) INTRAVENOUS ONCE
Refills: 0 | Status: COMPLETED | OUTPATIENT
Start: 2020-04-08 | End: 2020-04-08

## 2020-04-08 RX ORDER — ONDANSETRON 8 MG/1
4 TABLET, FILM COATED ORAL EVERY 8 HOURS
Refills: 0 | Status: DISCONTINUED | OUTPATIENT
Start: 2020-04-08 | End: 2020-04-24

## 2020-04-08 RX ORDER — INSULIN GLARGINE 100 [IU]/ML
10 INJECTION, SOLUTION SUBCUTANEOUS ONCE
Refills: 0 | Status: COMPLETED | OUTPATIENT
Start: 2020-04-08 | End: 2020-04-08

## 2020-04-08 RX ADMIN — Medication 50 MILLILITER(S): at 16:17

## 2020-04-08 RX ADMIN — Medication 50 MILLILITER(S): at 14:59

## 2020-04-08 RX ADMIN — Medication 17.5 MILLIGRAM(S): at 06:12

## 2020-04-08 RX ADMIN — INSULIN GLARGINE 20 UNIT(S): 100 INJECTION, SOLUTION SUBCUTANEOUS at 03:26

## 2020-04-08 RX ADMIN — PIPERACILLIN AND TAZOBACTAM 25 GRAM(S): 4; .5 INJECTION, POWDER, LYOPHILIZED, FOR SOLUTION INTRAVENOUS at 16:09

## 2020-04-08 RX ADMIN — HEPARIN SODIUM 5000 UNIT(S): 5000 INJECTION INTRAVENOUS; SUBCUTANEOUS at 16:09

## 2020-04-08 RX ADMIN — Medication 5 UNIT(S): at 17:25

## 2020-04-08 RX ADMIN — INSULIN GLARGINE 10 UNIT(S): 100 INJECTION, SOLUTION SUBCUTANEOUS at 23:17

## 2020-04-08 RX ADMIN — SODIUM ZIRCONIUM CYCLOSILICATE 10 GRAM(S): 10 POWDER, FOR SUSPENSION ORAL at 07:10

## 2020-04-08 RX ADMIN — Medication 650 MILLIGRAM(S): at 00:23

## 2020-04-08 RX ADMIN — INSULIN HUMAN 10 UNIT(S): 100 INJECTION, SOLUTION SUBCUTANEOUS at 16:16

## 2020-04-08 RX ADMIN — PIPERACILLIN AND TAZOBACTAM 25 GRAM(S): 4; .5 INJECTION, POWDER, LYOPHILIZED, FOR SOLUTION INTRAVENOUS at 06:10

## 2020-04-08 RX ADMIN — ONDANSETRON 4 MILLIGRAM(S): 8 TABLET, FILM COATED ORAL at 10:11

## 2020-04-08 RX ADMIN — Medication 15 GRAM(S): at 22:31

## 2020-04-08 RX ADMIN — SODIUM ZIRCONIUM CYCLOSILICATE 10 GRAM(S): 10 POWDER, FOR SUSPENSION ORAL at 16:06

## 2020-04-08 RX ADMIN — ONDANSETRON 4 MILLIGRAM(S): 8 TABLET, FILM COATED ORAL at 22:31

## 2020-04-08 NOTE — CHART NOTE - NSCHARTNOTEFT_GEN_A_CORE
Contact by Dialysis team regarding patient's shiley, which was placed at bedside (Left IJ) emergently for HD, was bleeding profusely so HD treatment was halted. Dialysis team consulted nephrologist Dr. Alber Phelps who agreed to stop HD treatment and consult vascular. Dialysis team was advised that patient's potassium has been consistently elevated, last K of 5.6. Advised by nephrology to order 10mg Lokelma until HD can be provided tomorrow. Hgb last 6.9. Will repeat CBC, BMP, T&S stat.   Will follow up with nephrology.   Will await consult from Vascular. Contact by Dialysis team regarding patient's shiley, which was placed at bedside (Left IJ) emergently for HD, was bleeding profusely so HD treatment was halted. Dialysis team consulted nephrologist Dr. Alber Phelps who agreed to stop HD treatment and consult vascular. Dialysis team was advised that patient's potassium has been consistently elevated, last K of 5.6. Advised by nephrology to order 10mg Lokelma until HD can be provided tomorrow. Hgb last 6.9. Will repeat CBC, BMP, T&S stat.   Will follow up with nephrology.   Will await consult from Vascular called by Renal.

## 2020-04-08 NOTE — PROGRESS NOTE ADULT - SUBJECTIVE AND OBJECTIVE BOX
[facility] Division of Hospital Medicine Progress Note    Patient is a 74y old  Female who presents with a chief complaint of LE swelling (07 Apr 2020 13:35)      SUBJECTIVE / OVERNIGHT EVENTS: Feels pain all over. No dyspnea. No chest pain. No abdominal pain. Making urine.  ADDITIONAL REVIEW OF SYSTEMS: None    MEDICATIONS  (STANDING):  dextrose 5%. 1000 milliLiter(s) (50 mL/Hr) IV Continuous <Continuous>  dextrose 50% Injectable 12.5 Gram(s) IV Push once  dextrose 50% Injectable 12.5 Gram(s) IV Push once  heparin  Injectable 5000 Unit(s) SubCutaneous every 12 hours  insulin glargine Injectable (LANTUS) 20 Unit(s) SubCutaneous at bedtime  insulin lispro (HumaLOG) corrective regimen sliding scale   SubCutaneous Before meals and at bedtime  insulin lispro Injectable (HumaLOG) 5 Unit(s) SubCutaneous three times a day before meals  piperacillin/tazobactam IVPB.. 3.375 Gram(s) IV Intermittent every 12 hours  predniSONE   Tablet 17.5 milliGRAM(s) Oral daily    MEDICATIONS  (PRN):  acetaminophen   Tablet .. 650 milliGRAM(s) Oral every 6 hours PRN Mild Pain (1 - 3), Moderate Pain (4 - 6), Severe Pain (7 - 10)  dextrose 40% Gel 15 Gram(s) Oral once PRN Blood Glucose LESS THAN 70 milliGRAM(s)/deciliter  glucagon  Injectable 1 milliGRAM(s) IntraMuscular once PRN Glucose LESS THAN 70 milligrams/deciliter  midodrine. 10 milliGRAM(s) Oral <User Schedule> PRN Pre-HD for Hypotension      CAPILLARY BLOOD GLUCOSE      POCT Blood Glucose.: 98 mg/dL (08 Apr 2020 11:24)  POCT Blood Glucose.: 76 mg/dL (08 Apr 2020 08:18)  POCT Blood Glucose.: 136 mg/dL (08 Apr 2020 03:16)  POCT Blood Glucose.: 134 mg/dL (07 Apr 2020 23:29)  POCT Blood Glucose.: 108 mg/dL (07 Apr 2020 22:49)  POCT Blood Glucose.: 83 mg/dL (07 Apr 2020 22:39)  POCT Blood Glucose.: 52 mg/dL (07 Apr 2020 22:08)  POCT Blood Glucose.: 51 mg/dL (07 Apr 2020 22:07)  POCT Blood Glucose.: 90 mg/dL (07 Apr 2020 16:49)    I&O's Summary    07 Apr 2020 07:01  -  08 Apr 2020 07:00  --------------------------------------------------------  IN: 400 mL / OUT: 829 mL / NET: -429 mL        PHYSICAL EXAM:  Vital Signs Last 24 Hrs  T(C): 36.5 (08 Apr 2020 06:14), Max: 37 (08 Apr 2020 00:30)  T(F): 97.7 (08 Apr 2020 06:14), Max: 98.6 (08 Apr 2020 00:30)  HR: 98 (08 Apr 2020 03:45) (98 - 117)  BP: 115/59 (08 Apr 2020 06:14) (111/62 - 134/60)  BP(mean): --  RR: 18 (08 Apr 2020 06:14) (18 - 20)  SpO2: 100% (08 Apr 2020 06:14) (100% - 100%)    CONSTITUTIONAL: NAD, well-developed, well-groomed  ENMT: Moist oral mucosa, no pharyngeal injection or exudates; normal dentition; left shiley in place without any bleeding  RESPIRATORY: Normal respiratory effort; lungs are clear to auscultation bilaterally  CARDIOVASCULAR: Regular rate and rhythm, normal S1 and S2    ABDOMEN: Nontender to palpation, normoactive bowel sounds, no rebound/guarding; No hepatosplenomegaly  PSYCH: A+O to person, place, and time; affect appropriate  NEUROLOGY: CN 2-12 are intact and symmetric; no gross sensory deficits   SKIN: No rashes; no palpable lesions  EXT: 2+ edema diffusely; +anasarca    LABS:                        6.6    18.96 )-----------( 65       ( 08 Apr 2020 13:02 )             24.6     04-08    140  |  105  |  106<H>  ----------------------------<  87  6.0<H>   |  23  |  2.94<H>    Ca    8.5      08 Apr 2020 13:02  Phos  5.1     04-08  Mg     2.0     04-08    TPro  5.7<L>  /  Alb  2.0<L>  /  TBili  0.3  /  DBili  x   /  AST  19  /  ALT  24  /  AlkPhos  158<H>  04-07    PT/INR - ( 07 Apr 2020 10:15 )   PT: 14.1 SEC;   INR: 1.23          PTT - ( 07 Apr 2020 10:15 )  PTT:25.8 SEC          COVID-19 PCR: NotDetec (04-05-20 @ 02:56)      RADIOLOGY & ADDITIONAL TESTS:  Imaging from Last 24 Hours:  Electrocardiogram/QTc Interval:    COORDINATION OF CARE:  Care Discussed with Consultants/Other Providers:

## 2020-04-08 NOTE — PROGRESS NOTE ADULT - ASSESSMENT
74 y.o F w/ PMH HLD, HTN, insulin dependent T2DM, RA (on chronic prednisone, leflunomide, and hydroxychloroquine) c/b bronchiectasis (on home O2 4 L NC) who p/w NBA on CKD now requiring CVVD, diastolic heart failure.

## 2020-04-08 NOTE — PROGRESS NOTE ADULT - PROBLEM SELECTOR PLAN 9
-Incidental finding of large 16cm tail of pancreas mass - will need EUS/FNB  -But will hold off for now given NBA, need for HD, and bacteremia  -Will consult GI eventually for workup of this lesion once other more acute issues have resolved

## 2020-04-08 NOTE — PROGRESS NOTE ADULT - PROBLEM SELECTOR PLAN 1
-Potassium is high still  -Give amp of bicarbonate and dose of sodium zirconium  -Discussed with renal failure - will plan for HD today

## 2020-04-08 NOTE — CHART NOTE - NSCHARTNOTEFT_GEN_A_CORE
Hospitalist addendum    Discussed with renal fellow again. Unclear if patient will get HD today due to scheduling. If unable to get HD today, would give 1/2 units of PRBC x 2 very slowly each over 4 hours. Would also give IV Bumex 2mg x 1, ok as per renal to give diuretics. Patient is consented for blood, and T&S x 2 sent by me.    To treat hyperkalemia, will give bicarb, oral binder as well as IV insulin.    Jerry Meyer MD

## 2020-04-08 NOTE — PROVIDER CONTACT NOTE (OTHER) - ASSESSMENT
Pt. site is profusely bleeding intra dialysis treatment; Pt. with telemetry and have event of rapid a-fib intra HD TX.

## 2020-04-09 LAB
ALBUMIN SERPL ELPH-MCNC: 2 G/DL — LOW (ref 3.3–5)
ALP SERPL-CCNC: 164 U/L — HIGH (ref 40–120)
ALT FLD-CCNC: 21 U/L — SIGNIFICANT CHANGE UP (ref 4–33)
ANION GAP SERPL CALC-SCNC: 14 MMO/L — SIGNIFICANT CHANGE UP (ref 7–14)
AST SERPL-CCNC: 16 U/L — SIGNIFICANT CHANGE UP (ref 4–32)
BILIRUB SERPL-MCNC: 0.3 MG/DL — SIGNIFICANT CHANGE UP (ref 0.2–1.2)
BUN SERPL-MCNC: 104 MG/DL — HIGH (ref 7–23)
CALCIUM SERPL-MCNC: 8.6 MG/DL — SIGNIFICANT CHANGE UP (ref 8.4–10.5)
CHLORIDE SERPL-SCNC: 102 MMOL/L — SIGNIFICANT CHANGE UP (ref 98–107)
CK MB BLD-MCNC: 2.79 NG/ML — SIGNIFICANT CHANGE UP (ref 1–4.7)
CK MB BLD-MCNC: SIGNIFICANT CHANGE UP (ref 0–2.5)
CK SERPL-CCNC: 85 U/L — SIGNIFICANT CHANGE UP (ref 25–170)
CO2 SERPL-SCNC: 26 MMOL/L — SIGNIFICANT CHANGE UP (ref 22–31)
CREAT SERPL-MCNC: 3.13 MG/DL — HIGH (ref 0.5–1.3)
GLUCOSE BLDC GLUCOMTR-MCNC: 108 MG/DL — HIGH (ref 70–99)
GLUCOSE BLDC GLUCOMTR-MCNC: 114 MG/DL — HIGH (ref 70–99)
GLUCOSE BLDC GLUCOMTR-MCNC: 80 MG/DL — SIGNIFICANT CHANGE UP (ref 70–99)
GLUCOSE BLDC GLUCOMTR-MCNC: 82 MG/DL — SIGNIFICANT CHANGE UP (ref 70–99)
GLUCOSE SERPL-MCNC: 67 MG/DL — LOW (ref 70–99)
HCT VFR BLD CALC: 20.8 % — CRITICAL LOW (ref 34.5–45)
HGB BLD-MCNC: 6 G/DL — CRITICAL LOW (ref 11.5–15.5)
MCHC RBC-ENTMCNC: 27.9 PG — SIGNIFICANT CHANGE UP (ref 27–34)
MCHC RBC-ENTMCNC: 28.8 % — LOW (ref 32–36)
MCV RBC AUTO: 96.7 FL — SIGNIFICANT CHANGE UP (ref 80–100)
NRBC # FLD: 0.03 K/UL — SIGNIFICANT CHANGE UP (ref 0–0)
PLATELET # BLD AUTO: 97 K/UL — LOW (ref 150–400)
PMV BLD: 13.2 FL — HIGH (ref 7–13)
POTASSIUM SERPL-MCNC: 5.3 MMOL/L — SIGNIFICANT CHANGE UP (ref 3.5–5.3)
POTASSIUM SERPL-SCNC: 5.3 MMOL/L — SIGNIFICANT CHANGE UP (ref 3.5–5.3)
PROT SERPL-MCNC: 5.4 G/DL — LOW (ref 6–8.3)
RBC # BLD: 2.15 M/UL — LOW (ref 3.8–5.2)
RBC # FLD: 15.9 % — HIGH (ref 10.3–14.5)
SODIUM SERPL-SCNC: 142 MMOL/L — SIGNIFICANT CHANGE UP (ref 135–145)
TROPONIN T, HIGH SENSITIVITY: 167 NG/L — CRITICAL HIGH (ref ?–14)
WBC # BLD: 18.4 K/UL — HIGH (ref 3.8–10.5)
WBC # FLD AUTO: 18.4 K/UL — HIGH (ref 3.8–10.5)

## 2020-04-09 PROCEDURE — 99232 SBSQ HOSP IP/OBS MODERATE 35: CPT

## 2020-04-09 PROCEDURE — 99232 SBSQ HOSP IP/OBS MODERATE 35: CPT | Mod: CS

## 2020-04-09 PROCEDURE — 93010 ELECTROCARDIOGRAM REPORT: CPT

## 2020-04-09 PROCEDURE — 99233 SBSQ HOSP IP/OBS HIGH 50: CPT | Mod: GC

## 2020-04-09 RX ORDER — BUMETANIDE 0.25 MG/ML
2 INJECTION INTRAMUSCULAR; INTRAVENOUS ONCE
Refills: 0 | Status: COMPLETED | OUTPATIENT
Start: 2020-04-09 | End: 2020-04-09

## 2020-04-09 RX ORDER — CEFTRIAXONE 500 MG/1
1000 INJECTION, POWDER, FOR SOLUTION INTRAMUSCULAR; INTRAVENOUS EVERY 24 HOURS
Refills: 0 | Status: COMPLETED | OUTPATIENT
Start: 2020-04-09 | End: 2020-04-15

## 2020-04-09 RX ORDER — DESMOPRESSIN ACETATE 0.1 MG/1
33 TABLET ORAL ONCE
Refills: 0 | Status: COMPLETED | OUTPATIENT
Start: 2020-04-09 | End: 2020-04-09

## 2020-04-09 RX ORDER — DESMOPRESSIN ACETATE 0.1 MG/1
33 TABLET ORAL ONCE
Refills: 0 | Status: DISCONTINUED | OUTPATIENT
Start: 2020-04-09 | End: 2020-04-09

## 2020-04-09 RX ADMIN — MIDODRINE HYDROCHLORIDE 10 MILLIGRAM(S): 2.5 TABLET ORAL at 06:29

## 2020-04-09 RX ADMIN — CEFTRIAXONE 100 MILLIGRAM(S): 500 INJECTION, POWDER, FOR SOLUTION INTRAMUSCULAR; INTRAVENOUS at 23:39

## 2020-04-09 RX ADMIN — ONDANSETRON 4 MILLIGRAM(S): 8 TABLET, FILM COATED ORAL at 19:45

## 2020-04-09 RX ADMIN — DESMOPRESSIN ACETATE 233 MICROGRAM(S): 0.1 TABLET ORAL at 12:01

## 2020-04-09 RX ADMIN — HEPARIN SODIUM 5000 UNIT(S): 5000 INJECTION INTRAVENOUS; SUBCUTANEOUS at 18:11

## 2020-04-09 RX ADMIN — PIPERACILLIN AND TAZOBACTAM 25 GRAM(S): 4; .5 INJECTION, POWDER, LYOPHILIZED, FOR SOLUTION INTRAVENOUS at 04:26

## 2020-04-09 RX ADMIN — Medication 17.5 MILLIGRAM(S): at 04:26

## 2020-04-09 RX ADMIN — INSULIN GLARGINE 20 UNIT(S): 100 INJECTION, SOLUTION SUBCUTANEOUS at 23:25

## 2020-04-09 RX ADMIN — BUMETANIDE 2 MILLIGRAM(S): 0.25 INJECTION INTRAMUSCULAR; INTRAVENOUS at 18:11

## 2020-04-09 NOTE — PROGRESS NOTE ADULT - PROBLEM SELECTOR PLAN 1
Pt. initially presented with severe hyperkalemia in the setting of NBA. Pt. noted to have elevated serum potassium of 7.5 on admission (4/4/20). Received medical management but serum potassium remained 6.9. Patient went for urgent HD 4/6/20. Patient non oliguric now, but remains hyperkalemic with poor clearance. Was in HD today but developed Aflutter and noted to have bleeding from catheter site. Vascular follow up regarding catheter and possible exchange. Can treat with Lokelma 10 mg TID for today. Monitor urine output.  Monitor serum potassium

## 2020-04-09 NOTE — PROGRESS NOTE ADULT - SUBJECTIVE AND OBJECTIVE BOX
[facility] DIVISION OF KIDNEY DISEASES AND HYPERTENSION -- FOLLOW UP NOTE  --------------------------------------------------------------------------------  73 yo F with RA c/b chronic bronchiectasis, HTN, and DM2 is admitted with SOB. Pt. currently COVID-19 rule out. Nephrology team is consulted for NBA and hyperkalemia. On admission (4/4/20), Scr was elevated to 2.82, which as worsened to 3.31 on 4/5/20. Serum potassium was elevated to 7.5 on admission, and despite multiple rounds of medical management, remained elevated at 6.9, so patient was taken for urgent HD on 4/6/20. Patient tolerated 1 hour of HD, treatment was discontinued early due to tachycardia. Patient was taken for HD on 4/7/20 but developed bleeding from catheter site. Was re-evaluated by Vascular. Patient was in HD today but developed Aflutter within 10 minutes of HD and continued to have bleeding from catheter site so HD treatment was stopped. Patient noted to have 570 ml UO over last 24 hours.    PAST HISTORY  --------------------------------------------------------------------------------  No significant changes to PMH, PSH, FHx, SHx, unless otherwise noted    ALLERGIES & MEDICATIONS  --------------------------------------------------------------------------------  Allergies    No Known Allergies    Intolerances      Standing Inpatient Medications  buMETAnide Injectable 2 milliGRAM(s) IV Push once  dextrose 5%. 1000 milliLiter(s) IV Continuous <Continuous>  dextrose 50% Injectable 12.5 Gram(s) IV Push once  dextrose 50% Injectable 12.5 Gram(s) IV Push once  heparin  Injectable 5000 Unit(s) SubCutaneous every 12 hours  insulin glargine Injectable (LANTUS) 20 Unit(s) SubCutaneous at bedtime  insulin lispro (HumaLOG) corrective regimen sliding scale   SubCutaneous Before meals and at bedtime  insulin lispro Injectable (HumaLOG) 5 Unit(s) SubCutaneous three times a day before meals  piperacillin/tazobactam IVPB.. 3.375 Gram(s) IV Intermittent every 12 hours  predniSONE   Tablet 17.5 milliGRAM(s) Oral daily    REVIEW OF SYSTEMS  --------------------------------------------------------------------------------  Gen: no fatigue  Respiratory: no dyspnea  CV: No chest pain  GI: + decreased oral intake  MSK: + LE edema  Neuro: No dizziness  Heme: No bleeding    All other systems were reviewed and are negative, except as noted.    VITALS/PHYSICAL EXAM  --------------------------------------------------------------------------------  T(C): 36.9 (04-09-20 @ 08:30), Max: 36.9 (04-09-20 @ 08:26)  HR: 108 (04-09-20 @ 08:30) (85 - 108)  BP: 140/59 (04-09-20 @ 08:30) (140/59 - 160/60)  RR: 17 (04-09-20 @ 08:30) (17 - 18)  SpO2: 100% (04-09-20 @ 04:28) (98% - 100%)  Wt(kg): --    04-08-20 @ 07:01  -  04-09-20 @ 07:00  --------------------------------------------------------  IN: 100 mL / OUT: 570 mL / NET: -470 mL    04-09-20 @ 07:01  -  04-09-20 @ 10:05  --------------------------------------------------------  IN: 400 mL / OUT: 187 mL / NET: 213 mL    Physical Exam:  	Gen: NAD  	HEENT: supple neck  	Pulm: diminished bibasilar breath sounds  	CV: RRR, S1S2; no rub  	Abd: +BS, soft, distended  	: + Cevallos catheter   	LE: Pitting edema, ? lymphedema b/l  	Skin: Right dorsal foot wound  	Vascular access: LIJ non tunneled HD catheter    LABS/STUDIES  --------------------------------------------------------------------------------              6.0    18.40 >-----------<  97       [04-09-20 @ 07:10]              20.8     142  |  102  |  104  ----------------------------<  67      [04-09-20 @ 07:10]  5.3   |  26  |  3.13        Ca     8.6     [04-09-20 @ 07:10]      Mg     2.0     [04-08-20 @ 13:02]      Phos  5.1     [04-08-20 @ 13:02]    TPro  5.4  /  Alb  2.0  /  TBili  0.3  /  DBili  x   /  AST  16  /  ALT  21  /  AlkPhos  164  [04-09-20 @ 07:10]    PT/INR: PT 14.1 , INR 1.23       [04-07-20 @ 10:15]  PTT: 25.8       [04-07-20 @ 10:15]    Creatinine Trend:  SCr 3.13 [04-09 @ 07:10]  SCr 2.94 [04-08 @ 13:02]  SCr 2.99 [04-07 @ 10:15]  SCr 3.06 [04-06 @ 21:26]  SCr 2.89 [04-06 @ 11:53]    Urinalysis - [04-04-20 @ 21:00]      Color YELLOW / Appearance CLEAR / SG 1.019 / pH 6.0      Gluc NEGATIVE / Ketone NEGATIVE  / Bili NEGATIVE / Urobili 0.2       Blood NEGATIVE / Protein MODERATE / Leuk Est SMALL / Nitrite NEGATIVE      RBC NONE / WBC 2-5 / Hyaline  / Gran  / Sq Epi  / Non Sq Epi FEW / Bacteria MODERATE    Urine Creatinine 68.10      [04-08-20 @ 06:00]  Urine Protein 41.9      [04-08-20 @ 06:00]  Urine Sodium < 20      [04-08-20 @ 06:00]  Urine Urea Nitrogen 357.2      [04-05-20 @ 22:50]  Urine Potassium 39.1      [04-08-20 @ 06:00]  Urine Chloride < 20      [04-08-20 @ 06:00]    Ferritin 815.9      [04-07-20 @ 10:15]  HbA1c 6.4      [04-05-20 @ 06:20]    HBsAg NEGATIVE      [04-05-20 @ 12:00]  HCV 0.21, Nonreactive Hepatitis C AB

## 2020-04-09 NOTE — PROGRESS NOTE ADULT - SUBJECTIVE AND OBJECTIVE BOX
CC: F/U bacteremia    Saw/spoke to patient. Patient generally well. No fevers, no chills. No new complaints.    Allergies  No Known Allergies    ANTIMICROBIALS:  cefTRIAXone   IVPB 1000 every 24 hours    PE:    Vital Signs Last 24 Hrs  T(C): 36.9 (09 Apr 2020 08:30), Max: 36.9 (09 Apr 2020 08:26)  T(F): 98.4 (09 Apr 2020 08:30), Max: 98.4 (09 Apr 2020 08:26)  HR: 109 (09 Apr 2020 10:52) (93 - 109)  BP: 140/59 (09 Apr 2020 08:30) (140/59 - 160/60)  RR: 16 (09 Apr 2020 10:52) (16 - 18)  SpO2: 100% (09 Apr 2020 10:52) (98% - 100%)    Gen: AOx3, NAD, non-toxic, pleasant  CV: S1+S2 normal, nontachycardic  Resp: Clear bilat, no resp distress, no crackles/wheezes  Abd: Soft, nontender, +BS  Ext: No LE edema, no wounds  IV/Skin: L rick IJ    LABS:                        6.0    18.40 )-----------( 97       ( 09 Apr 2020 07:10 )             20.8     04-09    142  |  102  |  104<H>  ----------------------------<  67<L>  5.3   |  26  |  3.13<H>    Ca    8.6      09 Apr 2020 07:10  Phos  5.1     04-08  Mg     2.0     04-08    TPro  5.4<L>  /  Alb  2.0<L>  /  TBili  0.3  /  DBili  x   /  AST  16  /  ALT  21  /  AlkPhos  164<H>  04-09    MICROBIOLOGY:    .Blood Blood-Venous  04-05-20   Growth in aerobic and anaerobic bottles: Escherichia coli  See previous culture [id number]  --    Growth in aerobic bottle: Gram Negative Rods  Growth in anaerobic bottle: Gram Negative Rods    .Blood Blood-Peripheral  04-05-20   Growth in aerobic and anaerobic bottles: Escherichia coli    (otherwise reviewed)    RADIOLOGY:    4/8 CT:    IMPRESSION: Indeterminate 16 cm left upper quadrant complex cystic mass likely arising from the pancreatic tail. 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    Right lower lobe calcified pleural nodule.

## 2020-04-09 NOTE — PROGRESS NOTE ADULT - ATTENDING COMMENTS
Blood loss anemia  NBA  ATN  non-oliguric    Monitor Is/Os closely and h/h. Will hold off from HD today and monitor labs and mental status for further HD needs.

## 2020-04-09 NOTE — PROGRESS NOTE ADULT - ASSESSMENT
74 y.o F w/ PMH HLD, HTN, insulin dependent T2DM, RA (on chronic prednisone, leflunomide, and hydroxychloroquine) c/b bronchiectasis (on home O2 4 L NC) who p/w NBA on CKD now requiring CVVD, diastolic heart failure.  Acute renal failure, unspecified acute renal failure type.- NaHCO3 drip and monitor K., patient developed Afib and not having dialysis per renal    Acute on chronic diastolic (congestive) heart failure.   E coli bacteremia: -Continue Zosynm Will repeat BCX to ensure clearance, CT of abdomen/pelvis was negative for active source.   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 complication  Hypertension, unspecified type-Monitor with midorine pre HD.if HD  Anemia due to blood loss-likely due to bleeding from [place], s/p  DDAVP, half unit of blood transfusion with Bumex   Pancreatic mass-Incidental finding of large 16cm tail of pancreas mass - will need GI evluation with EUS/FNB when NBA improved and bacteremia resolved  Prophylactic measure. -On SQ heparin; no signs of active bleeding now. 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  Acute renal failure, unspecified acute renal failure type.- NaHCO3 drip, s/p urgent dialysis on 4/6,  patient developed AFlutter/tachyarrhthmia at dialysis and bleeding from catheter site. Vascular followup.   K today was 5.3,  Renal suggests Lokelma 10 mg tid if hyperkalemia persists. will check K and urine output.     Acute on chronic diastolic (congestive) heart failure.   E coli bacteremia: -ceftriaxone, will repeat BCX to ensure clearance, CT of abdomen/pelvis was negative for active source.   COVID-19 testing negative, CXR clear   LE wounds- wound care as needed   Diarrhea-nonbloody, with resolved abdominal pain, will order stool tests if diarrhea persists.   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 -On SQ heparin; no signs of active bleeding now.     I updated patient's condition to her son today.

## 2020-04-09 NOTE — PROGRESS NOTE ADULT - ASSESSMENT
73 yo F w/ PMH HLD, HTN, insulin dependent T2DM, RA (on chronic prednisone, leflunomide, and hydroxychloroquine) c/b bronchiectasis (on home O2 4 L NC) who p/w worsening LE swelling  Leukocytosis, no fever  E coli bacteremia, unclear source, S to CTX  COVID neg, CXR clear, no fevers--would not retest as we have alternate explanation for inflammatory response  CT A/P reassuring but with pancreatic lesion  Overall,  1) E coli bacteremia  - Ceftriaxone 1g q 24  - DC Zosyn  - F/U BCXs for clearance  - F/U Strongy  2) R/O COVID  - CXR clear, no fevers, COVID PCR neg, lower suspicion for COVID  - As long as infection control in agreement can DC isolation and not check second test  3) Leukocytosis  - Likely due to bacteremia, trend to normal  - Monitor for alternate cause  4) LE wounds  - Wound care per primary team    Oral Peacock MD  Pager [phone]  After 5pm and on weekends call [phone]

## 2020-04-09 NOTE — CHART NOTE - NSCHARTNOTEFT_GEN_A_CORE
Notified patient with VEDA / sinus tach while on hemodialysis   Patient was seen in HD in NAD offers no complaints . Afebrile . BP stable . Taken off HD treatment as per HD staff. Noted to have bleeding at [facility] site. Vascular notified Transported to floor   EKG performed - irregular with no P waves - afib @ 110   on telemetry - NSR/ Sinus tach and episodes of VEDA  Complete Blood Count in AM (04.09.20 @ 07:10)    WBC Count: 18.40: Test Repeated K/uL    RBC Count: 2.15 M/uL    Hemoglobin: 6.0 g/dL    Hematocrit: 20.8 %    Mean Cell Volume: 96.7 fL    Mean Cell Hemoglobin: 27.9 pg    Mean Cell Hemoglobin Conc: 28.8 %    Red Cell Distrib Width: 15.9 %    Platelet Count - Automated: 97 K/uL    MPV: 13.2 fl    Nucleated RBC #: 0.03 K/uL    Comprehensive Metabolic Panel in AM (04.09.20 @ 07:10)    Sodium, Serum: 142 mmol/L    Potassium, Serum: 5.3: SPECIMEN NOT HEMOLYZED mmol/L    Chloride, Serum: 102 mmol/L    Carbon Dioxide, Serum: 26 mmol/L    Anion Gap, Serum: 14 mmo/L    Blood Urea Nitrogen, Serum: 104 mg/dL    Creatinine, Serum: 3.13 mg/dL    Glucose, Serum: 67 mg/dL    Calcium, Total Serum: 8.6 mg/dL    Protein Total, Serum: 5.4 g/dL    Albumin, Serum: 2.0 g/dL    Bilirubin Total, Serum: 0.3 mg/dL    Alkaline Phosphatase, Serum: 164 u/L    Aspartate Aminotransferase (AST/SGOT): 16 u/L    Alanine Aminotransferase (ALT/SGPT): 21 u/L  conditions associated with reduced secretion or  extrarenal elimination of creatinine. The eGFR equation  is not recommended for use in patients with unstable  creatinine levels. mL/min    eGFR if : 16 mL/min    74o F with hx of HTN , DM , RA, bronchiectasis ,new incidental finding of pancreatic mass ,  ren[facility]  now with rapid atrial fibrillation in the setting of infection / anemia   - continue telemetry   - EKG in chart - will add troponin and CPK to labs   - IJ [facility] bleeding durng HD - d/w vascular resident will evaluate patient / IJ site   - repeat blood cultures - pending - patient very hard stick multiple attempts made by staff - will need to check cultures with next HD  - monitor O sat on 3L   continue DVT prophylaxis for now given worsening anemia   - d/w attending aware of above will give DDAVP / transfuse 2   1/2 units slow with bumex in between. will likely need GI workup for anemia / pancreatic mass once infectious workup complete  - continue zosyn for bacteremia   nephrology to follow up

## 2020-04-09 NOTE — PROGRESS NOTE ADULT - PROBLEM SELECTOR PLAN 2
Pt. with likely NBA in the setting of infection, low BP, and decreased oral intake. Pt. however with possibly CKD in the setting of long-standing history of DM and HTN. Pt. with normal Scr of 0.96 on 6/7/16, no recent labs. On admission (4/4/20), Scr was elevated to 2.82, which worsened to 3.31 on 4/5/20, today 3.13. Pt. hypoalbuminemic and with protein on UA. Urine electrolytes consistent with pre-renal. Hep B and Hep C negative, C3,C4 WNL. Spot urine Pr/Cr ratio, non nephrotic range. ANCAs, anti-GBM, serum immunofixation labs pending. Renal US without hydronephrosis, however with large complex cystic lesion in LUQ. Encourage PO intake. Monitor labs and urine output    If any questions, please feel free to contact me  Oliver Ricci   Nephrology Fellow  [phone]  (After 5 pm or on weekends please page the on-call fellow) Pt. with likely NBA in the setting of infection, low BP, and decreased oral intake. Pt. however with possibly CKD in the setting of long-standing history of DM and HTN. Pt. with normal Scr of 0.96 on 6/7/16, no recent labs. On admission (4/4/20), Scr was elevated to 2.82, which worsened to 3.31 on 4/5/20, today 3.13. Pt. hypoalbuminemic and with protein on UA. Urine electrolytes consistent with pre-renal. Encourage PO intake. Agree with PRBC transfusion. Monitor labs and urine output    If any questions, please feel free to contact me  Oliver Ricci   Nephrology Fellow  [phone]  (After 5 pm or on weekends please page the on-call fellow)

## 2020-04-09 NOTE — PROGRESS NOTE ADULT - SUBJECTIVE AND OBJECTIVE BOX
[facility] Division of Hospital Medicine Progress Note    Patient is a 74y old  Female who presents with a chief complaint of LE swelling (08 Apr 2020 14:28)      SUBJECTIVE / OVERNIGHT EVENTS:  ADDITIONAL REVIEW OF SYSTEMS:    MEDICATIONS  (STANDING):  buMETAnide Injectable 2 milliGRAM(s) IV Push once  dextrose 5%. 1000 milliLiter(s) (50 mL/Hr) IV Continuous <Continuous>  dextrose 50% Injectable 12.5 Gram(s) IV Push once  dextrose 50% Injectable 12.5 Gram(s) IV Push once  heparin  Injectable 5000 Unit(s) SubCutaneous every 12 hours  insulin glargine Injectable (LANTUS) 20 Unit(s) SubCutaneous at bedtime  insulin lispro (HumaLOG) corrective regimen sliding scale   SubCutaneous Before meals and at bedtime  insulin lispro Injectable (HumaLOG) 5 Unit(s) SubCutaneous three times a day before meals  piperacillin/tazobactam IVPB.. 3.375 Gram(s) IV Intermittent every 12 hours  predniSONE   Tablet 17.5 milliGRAM(s) Oral daily    MEDICATIONS  (PRN):  acetaminophen   Tablet .. 650 milliGRAM(s) Oral every 6 hours PRN Mild Pain (1 - 3), Moderate Pain (4 - 6), Severe Pain (7 - 10)  desmopressin Injectable 33 MICROGram(s) IV Push once PRN Uremic bleeding  dextrose 40% Gel 15 Gram(s) Oral once PRN Blood Glucose LESS THAN 70 milliGRAM(s)/deciliter  glucagon  Injectable 1 milliGRAM(s) IntraMuscular once PRN Glucose LESS THAN 70 milligrams/deciliter  midodrine. 10 milliGRAM(s) Oral <User Schedule> PRN Pre-HD for Hypotension  ondansetron Injectable 4 milliGRAM(s) IV Push every 8 hours PRN Nausea and/or Vomiting      CAPILLARY BLOOD GLUCOSE      POCT Blood Glucose.: 82 mg/dL (09 Apr 2020 06:20)  POCT Blood Glucose.: 159 mg/dL (08 Apr 2020 23:12)  POCT Blood Glucose.: 115 mg/dL (08 Apr 2020 22:51)  POCT Blood Glucose.: 88 mg/dL (08 Apr 2020 22:29)  POCT Blood Glucose.: 89 mg/dL (08 Apr 2020 22:08)  POCT Blood Glucose.: 66 mg/dL (08 Apr 2020 21:45)  POCT Blood Glucose.: 61 mg/dL (08 Apr 2020 21:44)  POCT Blood Glucose.: 124 mg/dL (08 Apr 2020 17:17)  POCT Blood Glucose.: 98 mg/dL (08 Apr 2020 11:24)    I&O's Summary    08 Apr 2020 07:01  -  09 Apr 2020 07:00  --------------------------------------------------------  IN: 100 mL / OUT: 570 mL / NET: -470 mL    09 Apr 2020 07:01  -  09 Apr 2020 09:47  --------------------------------------------------------  IN: 400 mL / OUT: 187 mL / NET: 213 mL        PHYSICAL EXAM:  Vital Signs Last 24 Hrs  T(C): 36.9 (09 Apr 2020 08:30), Max: 36.9 (09 Apr 2020 08:26)  T(F): 98.4 (09 Apr 2020 08:30), Max: 98.4 (09 Apr 2020 08:26)  HR: 108 (09 Apr 2020 08:30) (85 - 108)  BP: 140/59 (09 Apr 2020 08:30) (140/59 - 160/60)  BP(mean): --  RR: 17 (09 Apr 2020 08:30) (17 - 18)  SpO2: 100% (09 Apr 2020 04:28) (98% - 100%)  CONSTITUTIONAL: NAD, well-developed, well-groomed  ENMT: Moist oral mucosa, no pharyngeal injection or exudates; normal dentition  RESPIRATORY: Normal respiratory effort; lungs are clear to auscultation bilaterally  CARDIOVASCULAR: Regular rate and rhythm, normal S1 and S2, no murmur/rub/gallop; No lower extremity edema; Peripheral pulses are 2+ bilaterally  ABDOMEN: Nontender to palpation, normoactive bowel sounds, no rebound/guarding; No hepatosplenomegaly  PSYCH: A+O to person, place, and time; affect appropriate  NEUROLOGY: CN 2-12 are intact and symmetric; no gross sensory deficits   SKIN: No rashes; no palpable lesions    LABS:                        6.0    18.40 )-----------( 97       ( 09 Apr 2020 07:10 )             20.8     04-09    142  |  102  |  104<H>  ----------------------------<  67<L>  5.3   |  26  |  3.13<H>    Ca    8.6      09 Apr 2020 07:10  Phos  5.1     04-08  Mg     2.0     04-08    TPro  5.4<L>  /  Alb  2.0<L>  /  TBili  0.3  /  DBili  x   /  AST  16  /  ALT  21  /  AlkPhos  164<H>  04-09    PT/INR - ( 07 Apr 2020 10:15 )   PT: 14.1 SEC;   INR: 1.23          PTT - ( 07 Apr 2020 10:15 )  PTT:25.8 SEC          COVID-19 PCR: NotDetec (04-05-20 @ 02:56)      RADIOLOGY & ADDITIONAL TESTS:  Imaging from Last 24 Hours:  Electrocardiogram/QTc Interval:    COORDINATION OF CARE:  Care Discussed with Consultants/Other Providers: [facility] Division of Hospital Medicine Progress Note    Patient is a 74y old  Female who presents with a chief complaint of LE swelling (08 Apr 2020 14:28)      SUBJECTIVE / OVERNIGHT EVENTS: no acute events  ADDITIONAL REVIEW OF SYSTEMS: afebrile, no dyspnea, loose bowel movements, no abdominal pain     MEDICATIONS  (STANDING):  buMETAnide Injectable 2 milliGRAM(s) IV Push once  dextrose 5%. 1000 milliLiter(s) (50 mL/Hr) IV Continuous <Continuous>  dextrose 50% Injectable 12.5 Gram(s) IV Push once  dextrose 50% Injectable 12.5 Gram(s) IV Push once  heparin  Injectable 5000 Unit(s) SubCutaneous every 12 hours  insulin glargine Injectable (LANTUS) 20 Unit(s) SubCutaneous at bedtime  insulin lispro (HumaLOG) corrective regimen sliding scale   SubCutaneous Before meals and at bedtime  insulin lispro Injectable (HumaLOG) 5 Unit(s) SubCutaneous three times a day before meals  piperacillin/tazobactam IVPB.. 3.375 Gram(s) IV Intermittent every 12 hours  predniSONE   Tablet 17.5 milliGRAM(s) Oral daily    MEDICATIONS  (PRN):  acetaminophen   Tablet .. 650 milliGRAM(s) Oral every 6 hours PRN Mild Pain (1 - 3), Moderate Pain (4 - 6), Severe Pain (7 - 10)  desmopressin Injectable 33 MICROGram(s) IV Push once PRN Uremic bleeding  dextrose 40% Gel 15 Gram(s) Oral once PRN Blood Glucose LESS THAN 70 milliGRAM(s)/deciliter  glucagon  Injectable 1 milliGRAM(s) IntraMuscular once PRN Glucose LESS THAN 70 milligrams/deciliter  midodrine. 10 milliGRAM(s) Oral <User Schedule> PRN Pre-HD for Hypotension  ondansetron Injectable 4 milliGRAM(s) IV Push every 8 hours PRN Nausea and/or Vomiting      CAPILLARY BLOOD GLUCOSE      POCT Blood Glucose.: 82 mg/dL (09 Apr 2020 06:20)  POCT Blood Glucose.: 159 mg/dL (08 Apr 2020 23:12)  POCT Blood Glucose.: 115 mg/dL (08 Apr 2020 22:51)  POCT Blood Glucose.: 88 mg/dL (08 Apr 2020 22:29)  POCT Blood Glucose.: 89 mg/dL (08 Apr 2020 22:08)  POCT Blood Glucose.: 66 mg/dL (08 Apr 2020 21:45)  POCT Blood Glucose.: 61 mg/dL (08 Apr 2020 21:44)  POCT Blood Glucose.: 124 mg/dL (08 Apr 2020 17:17)  POCT Blood Glucose.: 98 mg/dL (08 Apr 2020 11:24)    I&O's Summary    08 Apr 2020 07:01  -  09 Apr 2020 07:00  --------------------------------------------------------  IN: 100 mL / OUT: 570 mL / NET: -470 mL    09 Apr 2020 07:01  -  09 Apr 2020 09:47  --------------------------------------------------------  IN: 400 mL / OUT: 187 mL / NET: 213 mL        PHYSICAL EXAM:  Vital Signs Last 24 Hrs  T(C): 36.9 (09 Apr 2020 08:30), Max: 36.9 (09 Apr 2020 08:26)  T(F): 98.4 (09 Apr 2020 08:30), Max: 98.4 (09 Apr 2020 08:26)  HR: 108 (09 Apr 2020 08:30) (85 - 108)  BP: 140/59 (09 Apr 2020 08:30) (140/59 - 160/60)  BP(mean): --  RR: 17 (09 Apr 2020 08:30) (17 - 18)  SpO2: 100% (09 Apr 2020 04:28) (98% - 100%)  CONSTITUTIONAL: NAD, well-developed,   RESPIRATORY: Normal respiratory effort;   CARDIOVASCULAR: Regular rate and rhythm, normal S1 and S2,  ABDOMEN: Nontender to palpation,  SKIN: normal skin turgor.           LABS:                        6.0    18.40 )-----------( 97       ( 09 Apr 2020 07:10 )             20.8     04-09    142  |  102  |  104<H>  ----------------------------<  67<L>  5.3   |  26  |  3.13<H>    Ca    8.6      09 Apr 2020 07:10  Phos  5.1     04-08  Mg     2.0     04-08    TPro  5.4<L>  /  Alb  2.0<L>  /  TBili  0.3  /  DBili  x   /  AST  16  /  ALT  21  /  AlkPhos  164<H>  04-09    PT/INR - ( 07 Apr 2020 10:15 )   PT: 14.1 SEC;   INR: 1.23          PTT - ( 07 Apr 2020 10:15 )  PTT:25.8 SEC          COVID-19 PCR: NotDetec (04-05-20 @ 02:56)      RADIOLOGY & ADDITIONAL TESTS:  Imaging from Last 24 Hours:  Electrocardiogram/QTc Interval:    COORDINATION OF CARE:  Care Discussed with Consultants/Other Providers:

## 2020-04-10 LAB
BASOPHILS # BLD AUTO: 0.03 K/UL — SIGNIFICANT CHANGE UP (ref 0–0.2)
BASOPHILS NFR BLD AUTO: 0.1 % — SIGNIFICANT CHANGE UP (ref 0–2)
CK MB BLD-MCNC: 3.67 NG/ML — SIGNIFICANT CHANGE UP (ref 1–4.7)
CK SERPL-CCNC: 52 U/L — SIGNIFICANT CHANGE UP (ref 25–170)
EOSINOPHIL # BLD AUTO: 0.03 K/UL — SIGNIFICANT CHANGE UP (ref 0–0.5)
EOSINOPHIL NFR BLD AUTO: 0.1 % — SIGNIFICANT CHANGE UP (ref 0–6)
GLUCOSE BLDC GLUCOMTR-MCNC: 113 MG/DL — HIGH (ref 70–99)
GLUCOSE BLDC GLUCOMTR-MCNC: 137 MG/DL — HIGH (ref 70–99)
GLUCOSE BLDC GLUCOMTR-MCNC: 148 MG/DL — HIGH (ref 70–99)
GLUCOSE BLDC GLUCOMTR-MCNC: 199 MG/DL — HIGH (ref 70–99)
GLUCOSE BLDC GLUCOMTR-MCNC: 199 MG/DL — HIGH (ref 70–99)
GLUCOSE BLDC GLUCOMTR-MCNC: 258 MG/DL — HIGH (ref 70–99)
GLUCOSE BLDC GLUCOMTR-MCNC: 34 MG/DL — CRITICAL LOW (ref 70–99)
GLUCOSE BLDC GLUCOMTR-MCNC: 34 MG/DL — CRITICAL LOW (ref 70–99)
GLUCOSE BLDC GLUCOMTR-MCNC: 37 MG/DL — CRITICAL LOW (ref 70–99)
GLUCOSE BLDC GLUCOMTR-MCNC: 82 MG/DL — SIGNIFICANT CHANGE UP (ref 70–99)
GLUCOSE BLDC GLUCOMTR-MCNC: 87 MG/DL — SIGNIFICANT CHANGE UP (ref 70–99)
HCT VFR BLD CALC: 20.3 % — CRITICAL LOW (ref 34.5–45)
HGB BLD-MCNC: 5.9 G/DL — CRITICAL LOW (ref 11.5–15.5)
IMM GRANULOCYTES NFR BLD AUTO: 8 % — HIGH (ref 0–1.5)
LACTATE SERPL-SCNC: 0.4 MMOL/L — LOW (ref 0.5–2)
LIDOCAIN IGE QN: 85.1 U/L — HIGH (ref 7–60)
LYMPHOCYTES # BLD AUTO: 0.36 K/UL — LOW (ref 1–3.3)
LYMPHOCYTES # BLD AUTO: 1.8 % — LOW (ref 13–44)
MANUAL SMEAR VERIFICATION: SIGNIFICANT CHANGE UP
MCHC RBC-ENTMCNC: 28.1 PG — SIGNIFICANT CHANGE UP (ref 27–34)
MCHC RBC-ENTMCNC: 29.1 % — LOW (ref 32–36)
MCV RBC AUTO: 96.7 FL — SIGNIFICANT CHANGE UP (ref 80–100)
MONOCYTES # BLD AUTO: 0.82 K/UL — SIGNIFICANT CHANGE UP (ref 0–0.9)
MONOCYTES NFR BLD AUTO: 4 % — SIGNIFICANT CHANGE UP (ref 2–14)
NEUTROPHILS # BLD AUTO: 17.47 K/UL — HIGH (ref 1.8–7.4)
NEUTROPHILS NFR BLD AUTO: 86 % — HIGH (ref 43–77)
NRBC # FLD: 0.04 K/UL — SIGNIFICANT CHANGE UP (ref 0–0)
PLATELET # BLD AUTO: 119 K/UL — LOW (ref 150–400)
PMV BLD: 12.4 FL — SIGNIFICANT CHANGE UP (ref 7–13)
RBC # BLD: 2.1 M/UL — LOW (ref 3.8–5.2)
RBC # FLD: 16.4 % — HIGH (ref 10.3–14.5)
TROPONIN T, HIGH SENSITIVITY: 185 NG/L — CRITICAL HIGH (ref ?–14)
WBC # BLD: 20.33 K/UL — HIGH (ref 3.8–10.5)
WBC # FLD AUTO: 20.33 K/UL — HIGH (ref 3.8–10.5)

## 2020-04-10 PROCEDURE — 74019 RADEX ABDOMEN 2 VIEWS: CPT | Mod: 26

## 2020-04-10 PROCEDURE — 99233 SBSQ HOSP IP/OBS HIGH 50: CPT | Mod: GC

## 2020-04-10 PROCEDURE — 93010 ELECTROCARDIOGRAM REPORT: CPT

## 2020-04-10 PROCEDURE — 99232 SBSQ HOSP IP/OBS MODERATE 35: CPT | Mod: CS

## 2020-04-10 PROCEDURE — 99232 SBSQ HOSP IP/OBS MODERATE 35: CPT

## 2020-04-10 RX ORDER — METOPROLOL TARTRATE 50 MG
5 TABLET ORAL ONCE
Refills: 0 | Status: COMPLETED | OUTPATIENT
Start: 2020-04-10 | End: 2020-04-10

## 2020-04-10 RX ORDER — BUMETANIDE 0.25 MG/ML
2 INJECTION INTRAMUSCULAR; INTRAVENOUS ONCE
Refills: 0 | Status: COMPLETED | OUTPATIENT
Start: 2020-04-10 | End: 2020-04-10

## 2020-04-10 RX ORDER — DEXTROSE 50 % IN WATER 50 %
15 SYRINGE (ML) INTRAVENOUS ONCE
Refills: 0 | Status: COMPLETED | OUTPATIENT
Start: 2020-04-10 | End: 2020-04-10

## 2020-04-10 RX ORDER — DEXTROSE 50 % IN WATER 50 %
12.5 SYRINGE (ML) INTRAVENOUS ONCE
Refills: 0 | Status: COMPLETED | OUTPATIENT
Start: 2020-04-10 | End: 2020-04-10

## 2020-04-10 RX ORDER — DESMOPRESSIN ACETATE 0.1 MG/1
33 TABLET ORAL ONCE
Refills: 0 | Status: DISCONTINUED | OUTPATIENT
Start: 2020-04-10 | End: 2020-04-10

## 2020-04-10 RX ORDER — DEXTROSE 50 % IN WATER 50 %
15 SYRINGE (ML) INTRAVENOUS ONCE
Refills: 0 | Status: DISCONTINUED | OUTPATIENT
Start: 2020-04-10 | End: 2020-04-24

## 2020-04-10 RX ADMIN — Medication 15 GRAM(S): at 09:00

## 2020-04-10 RX ADMIN — Medication 650 MILLIGRAM(S): at 12:00

## 2020-04-10 RX ADMIN — HEPARIN SODIUM 5000 UNIT(S): 5000 INJECTION INTRAVENOUS; SUBCUTANEOUS at 17:07

## 2020-04-10 RX ADMIN — CEFTRIAXONE 100 MILLIGRAM(S): 500 INJECTION, POWDER, FOR SOLUTION INTRAMUSCULAR; INTRAVENOUS at 17:07

## 2020-04-10 RX ADMIN — Medication 17.5 MILLIGRAM(S): at 10:44

## 2020-04-10 RX ADMIN — Medication 650 MILLIGRAM(S): at 01:57

## 2020-04-10 RX ADMIN — Medication 1: at 18:45

## 2020-04-10 RX ADMIN — Medication 12.5 GRAM(S): at 09:17

## 2020-04-10 RX ADMIN — BUMETANIDE 2 MILLIGRAM(S): 0.25 INJECTION INTRAMUSCULAR; INTRAVENOUS at 22:28

## 2020-04-10 RX ADMIN — HEPARIN SODIUM 5000 UNIT(S): 5000 INJECTION INTRAVENOUS; SUBCUTANEOUS at 08:52

## 2020-04-10 RX ADMIN — Medication 650 MILLIGRAM(S): at 19:51

## 2020-04-10 RX ADMIN — Medication 1: at 12:57

## 2020-04-10 RX ADMIN — Medication 3: at 22:28

## 2020-04-10 RX ADMIN — Medication 5 MILLIGRAM(S): at 12:44

## 2020-04-10 NOTE — PROGRESS NOTE ADULT - PROBLEM SELECTOR PLAN 2
Pt. with likely NBA in the setting of infection, low BP, and decreased oral intake. Pt. however with possibly CKD in the setting of long-standing history of DM and HTN. Pt. with normal Scr of 0.96 on 6/7/16, no recent labs. On admission (4/4/20), Scr was elevated to 2.82, which worsened to 3.31 on 4/5/20, yesterday 3.13. Pt. hypoalbuminemic and with protein on UA. Urine electrolytes consistent with pre-renal. Encourage PO intake. Check Hgb post transfusion. Monitor labs and urine output    If any questions, please feel free to contact me  Oliver Ricci   Nephrology Fellow  [phone]  (After 5 pm or on weekends please page the on-call fellow)

## 2020-04-10 NOTE — PROVIDER CONTACT NOTE (OTHER) - ACTION/TREATMENT ORDERED:
hypoglycemia protocol followed. patient given PO dextrose as ordered and then 12.5 grams of D50 IV push per JOE Levin as blood sugar was still in 80s. fingerstick monitored every 15 minutes

## 2020-04-10 NOTE — ADVANCED PRACTICE NURSE CONSULT - ASSESSMENT
A&Ox4, patient received with supplemental oxygen, patient obese with large ABD pannus received with Tortoise positional device. Appears with poor hygiene (hair nodded with sanguinous drainage, no wounds). Generalized 4+ edema, left elbow with intact scab, currently bedbound, incontinent of urine and stool. Incontinent of urin and stool during skin assessment Incontinence care provided. Skin warm, dry with increased moisture in intertriginous folds, scattered areas of hyperpigmentation and hypopigmentation, scattered areas of ecchymosis on bilateral upper extremities. Blanchable erythema on bilateral heels.     Moisture associated dermatitis in bilateral breast and ABD pannus as evident by moist and hyperpigmentation. Bilateral knees with linear fissures exposing 100% fibrinous film and pink dermis.     Right anterior arm partially open fluid filled blister- [id number]. Tissue type presents as 30% open blister exposing pink dermis, 75% reabsorbing fluid filled blister with ecchymotic base. Small serous drainage NO odor. Periwound skin with ecchymosis. No increased warmth, no erythema, no induration. Goals of care: monitor for tissue type changes, manage drainage, reduce/control bioburden.     Moisture/Incontinence associated dermatitis in bilateral buttocks extending to posterior thighs with areas of denuded epidermis exposing pink dermis. Left buttock with linear area of purple maroon discoloration measuring 1.5cmx0.5cm. No tissue type changes palpated underneath purple maroon discoloration.     Bilateral lower extremities with scattered areas of hyper and hypopigmentation. Lichenification. Dry, flaky skin. Thin, shiny, taught skin. Thickened-yellow hyperpigmented overgrown toenails. Multiple wounds present. No temperature changes noted. 4+ Edema (mostly in dorsal foot). Capillary refill >3 seconds.         DP/PT pulses unable to palpate secondary to edema. + pallor on elevation. Left second toe with hyperpigmented callus. Right 5th toe with purple maroon discoloration.     Right anterior lower leg with reabsorbing fluid filled blister- [id number]. Tissue type presenting as 100% reabsorbing fluid filled blister. Periwound skin intact. No increased warmth, no erythema, no induration. Goals of care: monitor for tissue type changes, atraumatic dressing change.     Right dorsal aspect of foot open blister spontaneously opened upon cleansing (creamy discharge). No odor. [id number]. Tissue type presenting as 100% pale pink dermis with epidermal separation from open blister. Regular borders. Periwound skin intact. No increased warmth, no erythema, no induration. Goals of care: monitor for tissue type changes. manage drainage, reduce/control bioburden.     Left dorsal foot with scattered 4 intact reabsorbed fluid filled blisters largest one measures 1feb5me.   Left dorsal foot with one intact fluid filled blister measuring 1.1hsf6mo.     Left medial to lateral lower leg open blister measuring [id number]. Regular borders. Tissue type exposing 100% pale pink dermis and epidermal separation from 9-12 o'clock. Small serous drainage. No odor. Periwound skin intact. No increased warmth, no erythema, no induration. Goals of care: monitor for tissue type changes, reduce/control bioburden, atraumatic dressing change.

## 2020-04-10 NOTE — PROGRESS NOTE ADULT - SUBJECTIVE AND OBJECTIVE BOX
[facility] DIVISION OF KIDNEY DISEASES AND HYPERTENSION -- FOLLOW UP NOTE  --------------------------------------------------------------------------------  HPI: 73 yo F with RA c/b chronic bronchiectasis, HTN, and DM2 is admitted with SOB. Pt. currently COVID-19 rule out. Nephrology team is consulted for NBA and hyperkalemia. On admission (4/4/20), Scr was elevated to 2.82, which as worsened to 3.31 on 4/5/20. Serum potassium was elevated to 7.5 on admission, and despite multiple rounds of medical management, remained elevated at 6.9, so patient was taken for urgent HD on 4/6/20. Patient was in HD yesterday but developed Aflutter within 10 minutes of HD and continued to have bleeding from catheter site so HD treatment was stopped. Patient continues to have good urine output.    Patient evaluated at bedside, in no acute distress. Denies any new complaints.    PAST HISTORY  --------------------------------------------------------------------------------  No significant changes to PMH, PSH, FHx, SHx, unless otherwise noted    ALLERGIES & MEDICATIONS  --------------------------------------------------------------------------------  Allergies    No Known Allergies    Intolerances    Standing Inpatient Medications  cefTRIAXone   IVPB 1000 milliGRAM(s) IV Intermittent every 24 hours  dextrose 5%. 1000 milliLiter(s) IV Continuous <Continuous>  dextrose 50% Injectable 12.5 Gram(s) IV Push once  dextrose 50% Injectable 12.5 Gram(s) IV Push once  heparin  Injectable 5000 Unit(s) SubCutaneous every 12 hours  insulin glargine Injectable (LANTUS) 20 Unit(s) SubCutaneous at bedtime  insulin lispro (HumaLOG) corrective regimen sliding scale   SubCutaneous Before meals and at bedtime  insulin lispro Injectable (HumaLOG) 5 Unit(s) SubCutaneous three times a day before meals  predniSONE   Tablet 17.5 milliGRAM(s) Oral daily    REVIEW OF SYSTEMS  --------------------------------------------------------------------------------  Gen: no fatigue  Respiratory: no dyspnea  CV: No chest pain  GI: + decreased oral intake  MSK: + LE edema  Neuro: No dizziness  Heme: No bleeding    All other systems were reviewed and are negative, except as noted.    VITALS/PHYSICAL EXAM  --------------------------------------------------------------------------------  T(C): 36.7 (04-10-20 @ 10:45), Max: 36.7 (04-10-20 @ 10:45)  HR: 120 (04-10-20 @ 10:45) (88 - 120)  BP: 118/49 (04-10-20 @ 10:45) (110/46 - 134/51)  RR: 19 (04-10-20 @ 10:45) (16 - 19)  SpO2: 98% (04-10-20 @ 10:45) (98% - 100%)  Wt(kg): --    04-09-20 @ 07:01  -  04-10-20 @ 07:00  --------------------------------------------------------  IN: 938 mL / OUT: 337 mL / NET: 601 mL    Physical Exam:  	Gen: NAD  	HEENT: supple neck  	Pulm: diminished bibasilar breath sounds  	CV: RRR, S1S2; no rub  	Abd: +BS, soft, distended  	: + Cevallos catheter   	LE: Pitting edema, ? lymphedema b/l  	Skin: Right dorsal foot wound  	Vascular access: LIJ non tunneled HD catheter, no bleeding noted    LABS/STUDIES  --------------------------------------------------------------------------------              6.0    18.40 >-----------<  97       [04-09-20 @ 07:10]              20.8     142  |  102  |  104  ----------------------------<  67      [04-09-20 @ 07:10]  5.3   |  26  |  3.13        Ca     8.6     [04-09-20 @ 07:10]      Mg     2.0     [04-08-20 @ 13:02]      Phos  5.1     [04-08-20 @ 13:02]    TPro  5.4  /  Alb  2.0  /  TBili  0.3  /  DBili  x   /  AST  16  /  ALT  21  /  AlkPhos  164  [04-09-20 @ 07:10]    CK 85      [04-09-20 @ 07:10]    Creatinine Trend:  SCr 3.13 [04-09 @ 07:10]  SCr 2.94 [04-08 @ 13:02]  SCr 2.99 [04-07 @ 10:15]  SCr 3.06 [04-06 @ 21:26]  SCr 2.89 [04-06 @ 11:53]    Urine Creatinine 68.10      [04-08-20 @ 06:00]  Urine Protein 41.9      [04-08-20 @ 06:00]  Urine Sodium < 20      [04-08-20 @ 06:00]  Urine Urea Nitrogen 357.2      [04-05-20 @ 22:50]  Urine Potassium 39.1      [04-08-20 @ 06:00]  Urine Chloride < 20      [04-08-20 @ 06:00]    Ferritin 815.9      [04-07-20 @ 10:15]  HbA1c 6.4      [04-05-20 @ 06:20]    HBsAg NEGATIVE      [04-05-20 @ 12:00]  HCV 0.21, Nonreactive Hepatitis C AB

## 2020-04-10 NOTE — PROGRESS NOTE ADULT - PROBLEM SELECTOR PLAN 1
Pt. initially presented with severe hyperkalemia in the setting of NBA. Pt. noted to have elevated serum potassium of 7.5 on admission (4/4/20). Received medical management but serum potassium remained 6.9. Patient went for urgent HD 4/6/20. Patient non oliguric now, Serum potassium 5.3 yesterday. Low potassium diet. Monitor urine output.  Monitor serum potassium

## 2020-04-10 NOTE — CHART NOTE - NSCHARTNOTEFT_GEN_A_CORE
Troponin rising now 185 from 167. CK/CKMB normal. Per attending, likely from NBA and no need for cardiology consult. Will trend troponin and if it remains to uptrend will consider Cardiology consult.    Hg 5.9. Pt not actively bleeding. Pt asx. 1 unit PRBC to be given (1/2 unit x2 to prevent fluid overload with bumex in between). If Hg continues to drop, will consider Hematology consult. Will continue to monitor. Discussed with Dr. Love.

## 2020-04-10 NOTE — ADVANCED PRACTICE NURSE CONSULT - RECOMMEDATIONS
Upon discharge, Recommend follow up care at [facility]: [phone]. Address: [address] [place] [address].   Consider vascular consult for large wound in left leg and right dorsal foot.     Topical Recommendations    Protect posterior ears from nasal cannula with offloading trach tie (Weber).    Right arm, right foot, right leg and left leg (open/intact blisters): Clean with NS. pat dry. Apply Liquid barrier film to periwound skin. Place Adaptic touch silicone layer to entire area. Cover with Silicone foam AG w/o borders. Change every day.     All Intertriginous folds: Clean with soap and water. Pat dry. Place Interdry textile sheeting, under intertriginous folds leaving 2 inches exposed at ends to wick, remove to wash & dry affected area, then replace. Individual sheeting may be used for up to 5 days unless soiled.     Bilateral knees,  buttocks and posterior thighs: Clean with skin cleanser. Pat dry. Apply a thin layer of TRIAD barrier paste to entire area. Apply twice a day or PRN with incontinence.     Continue low air loss bed therapy, continue heel elevation with Z-flex fluidized positioning boots, continue to turn & reposition q2h with Z-rebekah fluidized positioning device, soft pillow behind knees, continue moisture management with barrier creams & single breathable pad, continue measures to decrease friction/shear/pressure. Continue with nutritional support as per dietary/orders.  Plan discussed with patient.     Please contact Wound Care Service Line if we can be of further assistance (ext [phone]). Upon discharge, Recommend follow up care at [facility]: [phone]. Address: [address] [place] [address].   Consider vascular consult for large wound in left leg and right dorsal foot.     Topical Recommendations    Protect posterior ears from nasal cannula with offloading trach tie ([place]).    Right arm, right foot, right leg and left leg (open/intact blisters): Clean with NS. pat dry. Apply Liquid barrier film to periwound skin. Place Adaptic touch silicone layer to entire area. Cover with Silicone foam AG w/o borders. Secure with Kerlix. Change every day.     All Intertriginous folds: Clean with soap and water. Pat dry. Place Interdry textile sheeting, under intertriginous folds leaving 2 inches exposed at ends to wick, remove to wash & dry affected area, then replace. Individual sheeting may be used for up to 5 days unless soiled.     Bilateral knees,  buttocks and posterior thighs: Clean with skin cleanser. Pat dry. Apply a thin layer of TRIAD barrier paste to entire area. Apply twice a day or PRN with incontinence.     Continue low air loss bed therapy, continue heel elevation with Z-flex fluidized positioning boots, continue to turn & reposition q2h with Z-rebekah fluidized positioning device, soft pillow behind knees, continue moisture management with barrier creams & single breathable pad, continue measures to decrease friction/shear/pressure. Continue with nutritional support as per dietary/orders.  Plan discussed with patient.     Please contact Wound Care Service Line if we can be of further assistance (ext [phone]).

## 2020-04-10 NOTE — PROGRESS NOTE ADULT - ASSESSMENT
75 yo F w/ PMH HLD, HTN, insulin dependent T2DM, RA (on chronic prednisone, leflunomide, and hydroxychloroquine) c/b bronchiectasis (on home O2 4 L NC) who p/w worsening LE swelling  Leukocytosis, no fever  E coli bacteremia, unclear source, S to CTX  COVID neg, CXR clear, no fevers--would not retest as we have alternate explanation for inflammatory response  CT A/P reassuring but with pancreatic lesion  Overall,  1) E coli bacteremia  - Ceftriaxone 1g q 24  - F/U BCXs for clearance  - Check Strongy  - Anticipate would complete treatment course with PO Cipro  2) R/O COVID  - CXR clear, no fevers, COVID PCR neg, lower suspicion for COVID  3) Leukocytosis  - Likely due to bacteremia, trend to normal  - Monitor for alternate cause  4) LE wounds  - Wound care per primary team    Oral Peacock MD  Pager [phone]  After 5pm and on weekends call [phone]

## 2020-04-10 NOTE — PROVIDER CONTACT NOTE (OTHER) - SITUATION
fingerstick 34 and rechecked to verify. patient alert, orientedex4, denies any symptoms of hypoglycemia but reports abdominal fullness and lack of appetite. reports she felt some nausea overnight.

## 2020-04-10 NOTE — PROGRESS NOTE ADULT - SUBJECTIVE AND OBJECTIVE BOX
Patient is a 74y old  Female who presents with a chief complaint of LE swelling (10 Apr 2020 11:41)      SUBJECTIVE / OVERNIGHT EVENTS:  ADDITIONAL REVIEW OF SYSTEMS:    MEDICATIONS  (STANDING):  cefTRIAXone   IVPB 1000 milliGRAM(s) IV Intermittent every 24 hours  dextrose 5%. 1000 milliLiter(s) (50 mL/Hr) IV Continuous <Continuous>  dextrose 50% Injectable 12.5 Gram(s) IV Push once  dextrose 50% Injectable 12.5 Gram(s) IV Push once  heparin  Injectable 5000 Unit(s) SubCutaneous every 12 hours  insulin glargine Injectable (LANTUS) 20 Unit(s) SubCutaneous at bedtime  insulin lispro (HumaLOG) corrective regimen sliding scale   SubCutaneous Before meals and at bedtime  insulin lispro Injectable (HumaLOG) 5 Unit(s) SubCutaneous three times a day before meals  predniSONE   Tablet 17.5 milliGRAM(s) Oral daily    MEDICATIONS  (PRN):  acetaminophen   Tablet .. 650 milliGRAM(s) Oral every 6 hours PRN Mild Pain (1 - 3), Moderate Pain (4 - 6), Severe Pain (7 - 10)  dextrose 40% Gel 15 Gram(s) Oral once PRN Blood Glucose LESS THAN 70 milliGRAM(s)/deciliter  dextrose 40% Gel 15 Gram(s) Oral once PRN Blood Glucose LESS THAN 70 milliGRAM(s)/deciliter  glucagon  Injectable 1 milliGRAM(s) IntraMuscular once PRN Glucose LESS THAN 70 milligrams/deciliter  midodrine. 10 milliGRAM(s) Oral <User Schedule> PRN Pre-HD for Hypotension  ondansetron Injectable 4 milliGRAM(s) IV Push every 8 hours PRN Nausea and/or Vomiting      CAPILLARY BLOOD GLUCOSE      POCT Blood Glucose.: 148 mg/dL (10 Apr 2020 10:42)  POCT Blood Glucose.: 137 mg/dL (10 Apr 2020 10:13)  POCT Blood Glucose.: 87 mg/dL (10 Apr 2020 09:49)  POCT Blood Glucose.: 82 mg/dL (10 Apr 2020 09:31)  POCT Blood Glucose.: 37 mg/dL (10 Apr 2020 09:16)  POCT Blood Glucose.: 34 mg/dL (10 Apr 2020 08:59)  POCT Blood Glucose.: 34 mg/dL (10 Apr 2020 08:56)  POCT Blood Glucose.: 113 mg/dL (09 Apr 2020 23:22)  POCT Blood Glucose.: 80 mg/dL (09 Apr 2020 21:29)  POCT Blood Glucose.: 114 mg/dL (09 Apr 2020 17:39)    I&O's Summary    09 Apr 2020 07:01  -  10 Apr 2020 07:00  --------------------------------------------------------  IN: 938 mL / OUT: 337 mL / NET: 601 mL        PHYSICAL EXAM:  Vital Signs Last 24 Hrs  T(C): 36.7 (10 Apr 2020 10:45), Max: 36.7 (10 Apr 2020 10:45)  T(F): 98 (10 Apr 2020 10:45), Max: 98 (10 Apr 2020 10:45)  HR: 120 (10 Apr 2020 10:45) (88 - 120)  BP: 118/49 (10 Apr 2020 10:45) (110/46 - 134/51)  BP(mean): --  RR: 19 (10 Apr 2020 10:45) (16 - 19)  SpO2: 98% (10 Apr 2020 10:45) (98% - 100%)  CONSTITUTIONAL: NAD, well-developed, well-groomed  ENMT: Moist oral mucosa, no pharyngeal injection or exudates; normal dentition  RESPIRATORY: Normal respiratory effort; lungs are clear to auscultation bilaterally  CARDIOVASCULAR: Regular rate and rhythm, normal S1 and S2, no murmur/rub/gallop; No lower extremity edema; Peripheral pulses are 2+ bilaterally  ABDOMEN: Nontender to palpation, normoactive bowel sounds, no rebound/guarding; No hepatosplenomegaly  PSYCH: A+O to person, place, and time; affect appropriate  NEUROLOGY: CN 2-12 are intact and symmetric; no gross sensory deficits   SKIN: No rashes; no palpable lesions    LABS:                        6.0    18.40 )-----------( 97       ( 09 Apr 2020 07:10 )             20.8     04-09    142  |  102  |  104<H>  ----------------------------<  67<L>  5.3   |  26  |  3.13<H>    Ca    8.6      09 Apr 2020 07:10  Phos  5.1     04-08  Mg     2.0     04-08    TPro  5.4<L>  /  Alb  2.0<L>  /  TBili  0.3  /  DBili  x   /  AST  16  /  ALT  21  /  AlkPhos  164<H>  04-09      CARDIAC MARKERS ( 09 Apr 2020 07:10 )  x     / x     / 85 u/L / 2.79 ng/mL / x              Culture - Blood (collected 08 Apr 2020 21:15)  Source: .Blood Blood  Preliminary Report (09 Apr 2020 22:02):    No growth to date.    Culture - Blood (collected 08 Apr 2020 21:09)  Source: .Blood Blood  Preliminary Report (09 Apr 2020 22:02):    No growth to date.      COVID-19 PCR: NotDetec (04-05-20 @ 02:56)      RADIOLOGY & ADDITIONAL TESTS:  Imaging from Last 24 Hours:  Electrocardiogram/QTc Interval:    COORDINATION OF CARE:  Care Discussed with Consultants/Other Providers: Patient is a 74y old  Female who presents with a chief complaint of LE swelling (10 Apr 2020 11:41)      SUBJECTIVE / OVERNIGHT EVENTS: no acute event  ADDITIONAL REVIEW OF SYSTEMS: abdominal pain, anorexia, non-bloody diarrhea, nausea but no vomiting or GI bleeding.   Anemia post 1 U BT    MEDICATIONS  (STANDING):  cefTRIAXone   IVPB 1000 milliGRAM(s) IV Intermittent every 24 hours  dextrose 5%. 1000 milliLiter(s) (50 mL/Hr) IV Continuous <Continuous>  dextrose 50% Injectable 12.5 Gram(s) IV Push once  dextrose 50% Injectable 12.5 Gram(s) IV Push once  heparin  Injectable 5000 Unit(s) SubCutaneous every 12 hours  insulin glargine Injectable (LANTUS) 20 Unit(s) SubCutaneous at bedtime  insulin lispro (HumaLOG) corrective regimen sliding scale   SubCutaneous Before meals and at bedtime  insulin lispro Injectable (HumaLOG) 5 Unit(s) SubCutaneous three times a day before meals  predniSONE   Tablet 17.5 milliGRAM(s) Oral daily    MEDICATIONS  (PRN):  acetaminophen   Tablet .. 650 milliGRAM(s) Oral every 6 hours PRN Mild Pain (1 - 3), Moderate Pain (4 - 6), Severe Pain (7 - 10)  dextrose 40% Gel 15 Gram(s) Oral once PRN Blood Glucose LESS THAN 70 milliGRAM(s)/deciliter  dextrose 40% Gel 15 Gram(s) Oral once PRN Blood Glucose LESS THAN 70 milliGRAM(s)/deciliter  glucagon  Injectable 1 milliGRAM(s) IntraMuscular once PRN Glucose LESS THAN 70 milligrams/deciliter  midodrine. 10 milliGRAM(s) Oral <User Schedule> PRN Pre-HD for Hypotension  ondansetron Injectable 4 milliGRAM(s) IV Push every 8 hours PRN Nausea and/or Vomiting      CAPILLARY BLOOD GLUCOSE      POCT Blood Glucose.: 148 mg/dL (10 Apr 2020 10:42)  POCT Blood Glucose.: 137 mg/dL (10 Apr 2020 10:13)  POCT Blood Glucose.: 87 mg/dL (10 Apr 2020 09:49)  POCT Blood Glucose.: 82 mg/dL (10 Apr 2020 09:31)  POCT Blood Glucose.: 37 mg/dL (10 Apr 2020 09:16)  POCT Blood Glucose.: 34 mg/dL (10 Apr 2020 08:59)  POCT Blood Glucose.: 34 mg/dL (10 Apr 2020 08:56)  POCT Blood Glucose.: 113 mg/dL (09 Apr 2020 23:22)  POCT Blood Glucose.: 80 mg/dL (09 Apr 2020 21:29)  POCT Blood Glucose.: 114 mg/dL (09 Apr 2020 17:39)    I&O's Summary    09 Apr 2020 07:01  -  10 Apr 2020 07:00  --------------------------------------------------------  IN: 938 mL / OUT: 337 mL / NET: 601 mL        PHYSICAL EXAM:  Vital Signs Last 24 Hrs  T(C): 36.7 (10 Apr 2020 10:45), Max: 36.7 (10 Apr 2020 10:45)  T(F): 98 (10 Apr 2020 10:45), Max: 98 (10 Apr 2020 10:45)  HR: 120 (10 Apr 2020 10:45) (88 - 120)  BP: 118/49 (10 Apr 2020 10:45) (110/46 - 134/51)  BP(mean): --  RR: 19 (10 Apr 2020 10:45) (16 - 19)  SpO2: 98% (10 Apr 2020 10:45) (98% - 100%)  CONSTITUTIONAL: NAD, well-developed,   RESPIRATORY: Normal respiratory effort; lungs are clear to auscultation bilaterally  CARDIOVASCULAR: Regular rate and rhythm, normal S1 and S2,   ABDOMEN: tender on palpation, no rebound or muscle guarding, no abdominal distention , normoactive bowel sounds   Neur/ PSYCH: A+O to person, place, and time; affect appropriate  SKIN: Normal skin turgor    LABS:                        6.0    18.40 )-----------( 97       ( 09 Apr 2020 07:10 )             20.8     04-09    142  |  102  |  104<H>  ----------------------------<  67<L>  5.3   |  26  |  3.13<H>    Ca    8.6      09 Apr 2020 07:10  Phos  5.1     04-08  Mg     2.0     04-08    TPro  5.4<L>  /  Alb  2.0<L>  /  TBili  0.3  /  DBili  x   /  AST  16  /  ALT  21  /  AlkPhos  164<H>  04-09      CARDIAC MARKERS ( 09 Apr 2020 07:10 )  x     / x     / 85 u/L / 2.79 ng/mL / x              Culture - Blood (collected 08 Apr 2020 21:15)  Source: .Blood Blood  Preliminary Report (09 Apr 2020 22:02):    No growth to date.    Culture - Blood (collected 08 Apr 2020 21:09)  Source: .Blood Blood  Preliminary Report (09 Apr 2020 22:02):    No growth to date.      COVID-19 PCR: NotDetec (04-05-20 @ 02:56)      RADIOLOGY & ADDITIONAL TESTS:  Imaging from Last 24 Hours:  Electrocardiogram/QTc Interval:    COORDINATION OF CARE:  Care Discussed with Consultants/Other Providers:

## 2020-04-10 NOTE — PROVIDER CONTACT NOTE (OTHER) - BACKGROUND
history of type 2 diabetes mellitus. patient has reported poor appetite today and yesterday due to feeling ob abdominal fullness. patient received 20 units of lantus insulin last night before bed.

## 2020-04-10 NOTE — CHART NOTE - NSCHARTNOTEFT_GEN_A_CORE
Notified by RN that patient c/o abdominal pain. Pt seen and examined at bedside - she localizes her abdominal pain to epigastric area, rates it as 6/10. States it feels like a dull pain. She has had this pain since admission. Pt reports she has been having diarrhea since admission, >3 times per day loose brown stool.    VITALS:   T(C): 36.6 (04-10-20 @ 12:36), Max: 36.7 (04-10-20 @ 10:45)  HR: 100 (04-10-20 @ 12:36) (88 - 120)  BP: 105/69 (04-10-20 @ 12:36) (105/69 - 134/51)  RR: 19 (04-10-20 @ 12:36) (16 - 19)  SpO2: 100% (04-10-20 @ 12:36) (98% - 100%)    GENERAL: NAD, lying in bed comfortably  HEAD:  Atraumatic, normocephalic  EYES: EOMI, PERRLA, conjunctiva and sclera clear  ENT: Moist mucous membranes  NECK: Supple, no JVD  HEART: Irregular rate and rhythm, no murmurs, rubs, or gallops  LUNGS: Unlabored respirations.  Clear to auscultation bilaterally, no crackles, wheezing, or rhonchi  ABDOMEN: Soft, +tender epigastric area, slightly distended, +BS  EXTREMITIES: 2+ peripheral pulses bilaterally. No clubbing, cyanosis, or edema  NERVOUS SYSTEM:  A&Ox3, no focal deficits   SKIN: No rashes or lesions    A/P: 74 y.o F w/ PMH HLD, HTN, insulin dependent T2DM, RA (on chronic prednisone and leflunomide) c/b bronchiectasis (on home O2 4 L NC) who is admitted with acute renal failure c/b hyperkalemia and acute on chronic diastolic heart failure. Pt had a CT scan showed indeterminate 16 cm left upper quadrant complex cystic mass likely arising from the pancreatic tail. 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 Lipase/lactate ordered STAT. GI Abdominal xray ordered STAT. Stool studies/c diff ordered for her diarrhea. EKG done STAT that showed a fib (HR 120s). Metoprolol 5mg IVP given STAT and HR improved. Discussed with Dr. Love.

## 2020-04-10 NOTE — CHART NOTE - NSCHARTNOTEFT_GEN_A_CORE
FS this AM was 35. Hypogylcemia protocol was followed and sugar improved. Pt remained asymptomatic. Lantus and Humalog DC'ed. Will monitor sugars. Discussed with Dr. Love.

## 2020-04-10 NOTE — PROGRESS NOTE ADULT - ASSESSMENT
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  Acute renal failure, unspecified acute renal failure type.- NaHCO3 drip, s/p urgent dialysis on 4/6,  patient developed AFlutter/tachyarrhthmia at dialysis and bleeding from catheter site. Vascular followup.   E coli bacteremia: -ceftriaxone, will repeat BCX to ensure clearance, CT of abdomen/pelvis was negative for active source.   COVID-19 testing negative, CXR clear   LE wounds- wound care as needed   Diarrhea-nonbloody, with resolved abdominal pain, will order stool tests if diarrhea persists.   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 -On SQ heparin; no signs of active bleeding now. 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  Acute renal failure, unspecified acute renal failure type.- s/p NaHCO3 drip, s/p urgent dialysis on 4/6,  patient developed AFlutter/tachyarrhthmia at dialysis and bleeding from catheter site.   Anemia- s/p acute blood loss from catheter, s/p  DDAVP, blood transfusion with Bumex no overt GI bleeding  Recurrent anemia of Hb 5.9 without over bleeding, Will give 1/2 unit of BT and 2 mg Bumex and another 1/2 unit of BT and Macrocytic and persistent anemia of unclear etiology- heme evaluation   E coli bacteremia: -ceftriaxone, will repeat BCX to ensure clearance,   COVID-19 testing negative, CXR clear   LE wounds- wound care as needed   Abdominal pain for 1-2 weeks and Diarrhea-nonbloody- will order stool PCR, elastase, blood for lipase and lactate , CT of abdomen/pelvis without contrast given NBA,  was negative for active GI pathology. 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 -On SQ heparin; no signs of active bleeding now.         Son was updated regarding  his mom's condition today.

## 2020-04-10 NOTE — ADVANCED PRACTICE NURSE CONSULT - REASON FOR CONSULT
Patient seen on skin care rounds after wound care referral received for assessment of skin impairment and recommendations of topical management. Chart reviewed.

## 2020-04-10 NOTE — PROGRESS NOTE ADULT - SUBJECTIVE AND OBJECTIVE BOX
CC: F/U bacteremia    Saw/spoke to patient. No fevers, no chills. Complains of abd pain.    Allergies  No Known Allergies    ANTIMICROBIALS:  cefTRIAXone   IVPB 1000 every 24 hours    PE:    Vital Signs Last 24 Hrs  T(C): 36.6 (10 Apr 2020 12:36), Max: 36.7 (10 Apr 2020 10:45)  T(F): 97.8 (10 Apr 2020 12:36), Max: 98 (10 Apr 2020 10:45)  HR: 100 (10 Apr 2020 12:36) (88 - 120)  BP: 105/69 (10 Apr 2020 12:36) (105/69 - 134/51)  RR: 19 (10 Apr 2020 12:36) (16 - 19)  SpO2: 100% (10 Apr 2020 12:36) (98% - 100%)    Gen: AOx3, NAD, non-toxic  CV: S1+S2 normal, nontachycardic  Resp: Clear bilat, no resp distress, no crackles/wheezes  Abd: Soft, nontender, +BS  Ext: No LE edema, no wounds  Lines: L IJ CVC    LABS:                        6.0    18.40 )-----------( 97       ( 09 Apr 2020 07:10 )             20.8     04-09    142  |  102  |  104<H>  ----------------------------<  67<L>  5.3   |  26  |  3.13<H>    Ca    8.6      09 Apr 2020 07:10    TPro  5.4<L>  /  Alb  2.0<L>  /  TBili  0.3  /  DBili  x   /  AST  16  /  ALT  21  /  AlkPhos  164<H>  04-09    MICROBIOLOGY:    .Blood Blood  04-08-20   No growth to date.    .Blood Blood-Venous  04-05-20   Growth in aerobic and anaerobic bottles: Escherichia coli  See previous culture [id number]  --    Growth in aerobic bottle: Gram Negative Rods  Growth in anaerobic bottle: Gram Negative Rods    .Blood Blood-Peripheral  04-05-20   Growth in aerobic and anaerobic bottles: Escherichia coli    RADIOLOGY:    4/8 CT:    IMPRESSION: Indeterminate 16 cm left upper quadrant complex cystic mass likely arising from the pancreatic tail. 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    Right lower lobe calcified pleural nodule.

## 2020-04-11 LAB
ANION GAP SERPL CALC-SCNC: 14 MMO/L — SIGNIFICANT CHANGE UP (ref 7–14)
BASO STIPL BLD QL SMEAR: PRESENT — SIGNIFICANT CHANGE UP
BASOPHILS # BLD AUTO: 0.05 K/UL — SIGNIFICANT CHANGE UP (ref 0–0.2)
BASOPHILS NFR BLD AUTO: 0.3 % — SIGNIFICANT CHANGE UP (ref 0–2)
BASOPHILS NFR SPEC: 0 % — SIGNIFICANT CHANGE UP (ref 0–2)
BLASTS # FLD: 0 % — SIGNIFICANT CHANGE UP (ref 0–0)
BLD GP AB SCN SERPL QL: NEGATIVE — SIGNIFICANT CHANGE UP
BUN SERPL-MCNC: 99 MG/DL — HIGH (ref 7–23)
CALCIUM SERPL-MCNC: 8.6 MG/DL — SIGNIFICANT CHANGE UP (ref 8.4–10.5)
CHLORIDE SERPL-SCNC: 102 MMOL/L — SIGNIFICANT CHANGE UP (ref 98–107)
CO2 SERPL-SCNC: 25 MMOL/L — SIGNIFICANT CHANGE UP (ref 22–31)
CREAT SERPL-MCNC: 2.81 MG/DL — HIGH (ref 0.5–1.3)
EOSINOPHIL # BLD AUTO: 0.12 K/UL — SIGNIFICANT CHANGE UP (ref 0–0.5)
EOSINOPHIL NFR BLD AUTO: 0.7 % — SIGNIFICANT CHANGE UP (ref 0–6)
EOSINOPHIL NFR FLD: 0 % — SIGNIFICANT CHANGE UP (ref 0–6)
GLUCOSE BLDC GLUCOMTR-MCNC: 109 MG/DL — HIGH (ref 70–99)
GLUCOSE BLDC GLUCOMTR-MCNC: 123 MG/DL — HIGH (ref 70–99)
GLUCOSE BLDC GLUCOMTR-MCNC: 147 MG/DL — HIGH (ref 70–99)
GLUCOSE BLDC GLUCOMTR-MCNC: 177 MG/DL — HIGH (ref 70–99)
GLUCOSE SERPL-MCNC: 117 MG/DL — HIGH (ref 70–99)
HCT VFR BLD CALC: 23.3 % — LOW (ref 34.5–45)
HGB BLD-MCNC: 7 G/DL — CRITICAL LOW (ref 11.5–15.5)
IMM GRANULOCYTES NFR BLD AUTO: 7.2 % — HIGH (ref 0–1.5)
LYMPHOCYTES # BLD AUTO: 0.55 K/UL — LOW (ref 1–3.3)
LYMPHOCYTES # BLD AUTO: 3.2 % — LOW (ref 13–44)
LYMPHOCYTES NFR SPEC AUTO: 0.9 % — LOW (ref 13–44)
MAGNESIUM SERPL-MCNC: 2.1 MG/DL — SIGNIFICANT CHANGE UP (ref 1.6–2.6)
MCHC RBC-ENTMCNC: 28.3 PG — SIGNIFICANT CHANGE UP (ref 27–34)
MCHC RBC-ENTMCNC: 30 % — LOW (ref 32–36)
MCV RBC AUTO: 94.3 FL — SIGNIFICANT CHANGE UP (ref 80–100)
METAMYELOCYTES # FLD: 1.7 % — HIGH (ref 0–1)
MONOCYTES # BLD AUTO: 0.57 K/UL — SIGNIFICANT CHANGE UP (ref 0–0.9)
MONOCYTES NFR BLD AUTO: 3.3 % — SIGNIFICANT CHANGE UP (ref 2–14)
MONOCYTES NFR BLD: 3.5 % — SIGNIFICANT CHANGE UP (ref 2–9)
MYELOCYTES NFR BLD: 0 % — SIGNIFICANT CHANGE UP (ref 0–0)
NEUTROPHIL AB SER-ACNC: 91.3 % — HIGH (ref 43–77)
NEUTROPHILS # BLD AUTO: 14.55 K/UL — HIGH (ref 1.8–7.4)
NEUTROPHILS NFR BLD AUTO: 85.3 % — HIGH (ref 43–77)
NEUTS BAND # BLD: 0.9 % — SIGNIFICANT CHANGE UP (ref 0–6)
NRBC # BLD: 1 /100WBC — SIGNIFICANT CHANGE UP
NRBC # FLD: 0.06 K/UL — SIGNIFICANT CHANGE UP (ref 0–0)
OTHER - HEMATOLOGY %: 1.7 — SIGNIFICANT CHANGE UP
PHOSPHATE SERPL-MCNC: 6.1 MG/DL — HIGH (ref 2.5–4.5)
PLATELET # BLD AUTO: 134 K/UL — LOW (ref 150–400)
PLATELET COUNT - ESTIMATE: SIGNIFICANT CHANGE UP
PMV BLD: 12.2 FL — SIGNIFICANT CHANGE UP (ref 7–13)
POTASSIUM SERPL-MCNC: 5.7 MMOL/L — HIGH (ref 3.5–5.3)
POTASSIUM SERPL-SCNC: 5.7 MMOL/L — HIGH (ref 3.5–5.3)
PROMYELOCYTES # FLD: 0 % — SIGNIFICANT CHANGE UP (ref 0–0)
RBC # BLD: 2.47 M/UL — LOW (ref 3.8–5.2)
RBC # FLD: 18.2 % — HIGH (ref 10.3–14.5)
RH IG SCN BLD-IMP: POSITIVE — SIGNIFICANT CHANGE UP
SODIUM SERPL-SCNC: 141 MMOL/L — SIGNIFICANT CHANGE UP (ref 135–145)
TROPONIN T, HIGH SENSITIVITY: 191 NG/L — CRITICAL HIGH (ref ?–14)
VARIANT LYMPHS # BLD: 0 % — SIGNIFICANT CHANGE UP
WBC # BLD: 17.06 K/UL — HIGH (ref 3.8–10.5)
WBC # FLD AUTO: 17.06 K/UL — HIGH (ref 3.8–10.5)

## 2020-04-11 PROCEDURE — 99232 SBSQ HOSP IP/OBS MODERATE 35: CPT | Mod: GC

## 2020-04-11 PROCEDURE — 99233 SBSQ HOSP IP/OBS HIGH 50: CPT

## 2020-04-11 RX ORDER — SIMETHICONE 80 MG/1
80 TABLET, CHEWABLE ORAL
Refills: 0 | Status: DISCONTINUED | OUTPATIENT
Start: 2020-04-11 | End: 2020-04-24

## 2020-04-11 RX ORDER — HYDROMORPHONE HYDROCHLORIDE 2 MG/ML
0.5 INJECTION INTRAMUSCULAR; INTRAVENOUS; SUBCUTANEOUS EVERY 8 HOURS
Refills: 0 | Status: DISCONTINUED | OUTPATIENT
Start: 2020-04-11 | End: 2020-04-13

## 2020-04-11 RX ORDER — METOCLOPRAMIDE HCL 10 MG
10 TABLET ORAL ONCE
Refills: 0 | Status: COMPLETED | OUTPATIENT
Start: 2020-04-11 | End: 2020-04-11

## 2020-04-11 RX ADMIN — Medication 1: at 13:27

## 2020-04-11 RX ADMIN — CEFTRIAXONE 100 MILLIGRAM(S): 500 INJECTION, POWDER, FOR SOLUTION INTRAMUSCULAR; INTRAVENOUS at 18:08

## 2020-04-11 RX ADMIN — Medication 17.5 MILLIGRAM(S): at 05:16

## 2020-04-11 RX ADMIN — HEPARIN SODIUM 5000 UNIT(S): 5000 INJECTION INTRAVENOUS; SUBCUTANEOUS at 05:15

## 2020-04-11 RX ADMIN — HEPARIN SODIUM 5000 UNIT(S): 5000 INJECTION INTRAVENOUS; SUBCUTANEOUS at 18:08

## 2020-04-11 RX ADMIN — Medication 10 MILLIGRAM(S): at 10:19

## 2020-04-11 RX ADMIN — HYDROMORPHONE HYDROCHLORIDE 0.5 MILLIGRAM(S): 2 INJECTION INTRAMUSCULAR; INTRAVENOUS; SUBCUTANEOUS at 20:03

## 2020-04-11 RX ADMIN — SIMETHICONE 80 MILLIGRAM(S): 80 TABLET, CHEWABLE ORAL at 10:19

## 2020-04-11 RX ADMIN — Medication 650 MILLIGRAM(S): at 03:10

## 2020-04-11 NOTE — PROGRESS NOTE ADULT - PROBLEM SELECTOR PLAN 1
-s/p HD today  -Will monitor Cr and urine output  -Had HD today to treat hyperkalemia from this morning

## 2020-04-11 NOTE — PROGRESS NOTE ADULT - SUBJECTIVE AND OBJECTIVE BOX
[facility] Division of Hospital Medicine Progress Note    Patient is a 74y old  Female who presents with a chief complaint of LE swelling (07 Apr 2020 13:35)      SUBJECTIVE / OVERNIGHT EVENTS: Some chronic abdominal pain which feels like "bloating." No dyspnea. No chest pain. Making urine. Had uneventful dialysis session this morning.  ADDITIONAL REVIEW OF SYSTEMS: None    MEDICATIONS  (STANDING):  dextrose 5%. 1000 milliLiter(s) (50 mL/Hr) IV Continuous <Continuous>  dextrose 50% Injectable 12.5 Gram(s) IV Push once  dextrose 50% Injectable 12.5 Gram(s) IV Push once  heparin  Injectable 5000 Unit(s) SubCutaneous every 12 hours  insulin glargine Injectable (LANTUS) 20 Unit(s) SubCutaneous at bedtime  insulin lispro (HumaLOG) corrective regimen sliding scale   SubCutaneous Before meals and at bedtime  insulin lispro Injectable (HumaLOG) 5 Unit(s) SubCutaneous three times a day before meals  piperacillin/tazobactam IVPB.. 3.375 Gram(s) IV Intermittent every 12 hours  predniSONE   Tablet 17.5 milliGRAM(s) Oral daily    MEDICATIONS  (PRN):  acetaminophen   Tablet .. 650 milliGRAM(s) Oral every 6 hours PRN Mild Pain (1 - 3), Moderate Pain (4 - 6), Severe Pain (7 - 10)  dextrose 40% Gel 15 Gram(s) Oral once PRN Blood Glucose LESS THAN 70 milliGRAM(s)/deciliter  glucagon  Injectable 1 milliGRAM(s) IntraMuscular once PRN Glucose LESS THAN 70 milligrams/deciliter  midodrine. 10 milliGRAM(s) Oral <User Schedule> PRN Pre-HD for Hypotension      CAPILLARY BLOOD GLUCOSE      POCT Blood Glucose.: 98 mg/dL (08 Apr 2020 11:24)  POCT Blood Glucose.: 76 mg/dL (08 Apr 2020 08:18)  POCT Blood Glucose.: 136 mg/dL (08 Apr 2020 03:16)  POCT Blood Glucose.: 134 mg/dL (07 Apr 2020 23:29)  POCT Blood Glucose.: 108 mg/dL (07 Apr 2020 22:49)  POCT Blood Glucose.: 83 mg/dL (07 Apr 2020 22:39)  POCT Blood Glucose.: 52 mg/dL (07 Apr 2020 22:08)  POCT Blood Glucose.: 51 mg/dL (07 Apr 2020 22:07)  POCT Blood Glucose.: 90 mg/dL (07 Apr 2020 16:49)    I&O's Summary    07 Apr 2020 07:01  -  08 Apr 2020 07:00  --------------------------------------------------------  IN: 400 mL / OUT: 829 mL / NET: -429 mL        PHYSICAL EXAM:  Vital Signs Last 24 Hrs  T(C): 36.5 (08 Apr 2020 06:14), Max: 37 (08 Apr 2020 00:30)  T(F): 97.7 (08 Apr 2020 06:14), Max: 98.6 (08 Apr 2020 00:30)  HR: 98 (08 Apr 2020 03:45) (98 - 117)  BP: 115/59 (08 Apr 2020 06:14) (111/62 - 134/60)  BP(mean): --  RR: 18 (08 Apr 2020 06:14) (18 - 20)  SpO2: 100% (08 Apr 2020 06:14) (100% - 100%)    CONSTITUTIONAL: NAD, well-developed, well-groomed  ENMT: Moist oral mucosa, no pharyngeal injection or exudates; normal dentition; left shiley in place without any bleeding  RESPIRATORY: Normal respiratory effort; lungs are clear to auscultation bilaterally  CARDIOVASCULAR: Regular rate and rhythm, normal S1 and S2    ABDOMEN: mild tenderness to left of mid abdomen, no rebound/guarding, mild fullness; normoactive bowel sounds, no rebound/guarding; No hepatosplenomegaly  PSYCH: A+O to person, place, and time; affect appropriate  NEUROLOGY: CN 2-12 are intact and symmetric; no gross sensory deficits   SKIN: No rashes; no palpable lesions  EXT: 2+ edema diffusely; +anasarca    LABS:                        6.6    18.96 )-----------( 65       ( 08 Apr 2020 13:02 )             24.6     04-08    140  |  105  |  106<H>  ----------------------------<  87  6.0<H>   |  23  |  2.94<H>    Ca    8.5      08 Apr 2020 13:02  Phos  5.1     04-08  Mg     2.0     04-08    TPro  5.7<L>  /  Alb  2.0<L>  /  TBili  0.3  /  DBili  x   /  AST  19  /  ALT  24  /  AlkPhos  158<H>  04-07    PT/INR - ( 07 Apr 2020 10:15 )   PT: 14.1 SEC;   INR: 1.23          PTT - ( 07 Apr 2020 10:15 )  PTT:25.8 SEC          COVID-19 PCR: NotDeteedelmira (04-05-20 @ 02:56)      RADIOLOGY & ADDITIONAL TESTS:  Imaging from Last 24 Hours:  Electrocardiogram/QTc Interval:    COORDINATION OF CARE:  Care Discussed with Consultants/Other Providers:

## 2020-04-11 NOTE — PROGRESS NOTE ADULT - SUBJECTIVE AND OBJECTIVE BOX
[facility] Division of Kidney Diseases & Hypertension  FOLLOW UP NOTE  [phone]--------------------------------------------------------------------------------    HPI: 73 yo F with RA c/b chronic bronchiectasis, HTN, and DM2 is admitted with SOB. Pt. currently COVID-19 rule out. Nephrology team is consulted for NBA and hyperkalemia. On admission (4/4/20), Scr was elevated to 2.82, which as worsened to 3.31 on 4/5/20. Serum potassium was elevated to 7.5 on admission, and despite multiple rounds of medical management, remained elevated at 6.9, so patient was taken for urgent HD on 4/6/20. Patient was in HD yesterday but developed Aflutter within 10 minutes of HD and continued to have bleeding from catheter site so HD treatment was stopped. Patient continues to have good urine output.    Pt had HD session today without UF removal  Received 1 unit PRBC    PAST HISTORY  --------------------------------------------------------------------------------  No significant changes to PMH, PSH, FHx, SHx, unless otherwise noted    ALLERGIES & MEDICATIONS  --------------------------------------------------------------------------------  Allergies    No Known Allergies    Intolerances      Standing Inpatient Medications  cefTRIAXone   IVPB 1000 milliGRAM(s) IV Intermittent every 24 hours  dextrose 5%. 1000 milliLiter(s) IV Continuous <Continuous>  dextrose 50% Injectable 12.5 Gram(s) IV Push once  dextrose 50% Injectable 12.5 Gram(s) IV Push once  heparin  Injectable 5000 Unit(s) SubCutaneous every 12 hours  insulin lispro (HumaLOG) corrective regimen sliding scale   SubCutaneous Before meals and at bedtime  predniSONE   Tablet 17.5 milliGRAM(s) Oral daily    PRN Inpatient Medications  acetaminophen   Tablet .. 650 milliGRAM(s) Oral every 6 hours PRN  dextrose 40% Gel 15 Gram(s) Oral once PRN  dextrose 40% Gel 15 Gram(s) Oral once PRN  glucagon  Injectable 1 milliGRAM(s) IntraMuscular once PRN  midodrine. 10 milliGRAM(s) Oral <User Schedule> PRN  ondansetron Injectable 4 milliGRAM(s) IV Push every 8 hours PRN  simethicone 80 milliGRAM(s) Chew two times a day PRN      REVIEW OF SYSTEMS  --------------------------------------------------------------------------------  Gen: No  fevers/chills  Skin: No rashes  Head/Eyes/Ears/Mouth: No headache; Normal hearing; Normal vision w/o blurriness  Respiratory: No dyspnea, cough, wheezing, hemoptysis  CV: No chest pain, PND, orthopnea  GI: No abdominal pain, diarrhea, constipation, nausea, vomiting  : No increased frequency, dysuria, hematuria, nocturia  MSK: No joint pain/swelling; no back pain; no edema  Neuro: No dizziness/lightheadedness, weakness, seizures, numbness, tingling      All other systems were reviewed and are negative, except as noted.    VITALS/PHYSICAL EXAM  --------------------------------------------------------------------------------  T(C): 36.6 (04-11-20 @ 10:15), Max: 37 (04-10-20 @ 19:05)  HR: 82 (04-11-20 @ 10:15) (74 - 110)  BP: 119/58 (04-11-20 @ 10:15) (105/69 - 146/63)  RR: 18 (04-11-20 @ 10:15) (16 - 19)  SpO2: 100% (04-11-20 @ 04:54) (97% - 100%)  Wt(kg): --        04-11-20 @ 07:01  -  04-11-20 @ 12:09  --------------------------------------------------------  IN: 400 mL / OUT: 1140 mL / NET: -740 mL      Physical Exam:  	Gen: NAD, well-appearing  	HEENT: PERRL, supple neck, clear oropharynx  	Pulm: CTA B/L  	CV: RRR, S1S2;  	Back: No spinal or CVA tenderness  	Abd: +BS, soft, nontender/nondistended  	: No suprapubic tenderness                      Extremities: no bilateral LE edema noted.                       Neuro: No focal deficits, intact gait  	Skin: Warm, without rashes  	Vascular access:    LABS/STUDIES  --------------------------------------------------------------------------------              7.0    17.06 >-----------<  134      [04-11-20 @ 08:00]              23.3     141  |  102  |  99  ----------------------------<  117      [04-11-20 @ 08:00]  5.7   |  25  |  2.81        Ca     8.6     [04-11-20 @ 08:00]      Mg     2.1     [04-11-20 @ 08:00]      Phos  6.1     [04-11-20 @ 08:00]          CK 52      [04-10-20 @ 16:00]    Creatinine Trend:  SCr 2.81 [04-11 @ 08:00]  SCr 3.13 [04-09 @ 07:10]  SCr 2.94 [04-08 @ 13:02]  SCr 2.99 [04-07 @ 10:15]  SCr 3.06 [04-06 @ 21:26]    Urinalysis - [04-04-20 @ 21:00]      Color YELLOW / Appearance CLEAR / SG 1.019 / pH 6.0      Gluc NEGATIVE / Ketone NEGATIVE  / Bili NEGATIVE / Urobili 0.2       Blood NEGATIVE / Protein MODERATE / Leuk Est SMALL / Nitrite NEGATIVE      RBC NONE / WBC 2-5 / Hyaline  / Gran  / Sq Epi  / Non Sq Epi FEW / Bacteria MODERATE    Urine Creatinine 68.10      [04-08-20 @ 06:00]  Urine Protein 41.9      [04-08-20 @ 06:00]  Urine Sodium < 20      [04-08-20 @ 06:00]  Urine Urea Nitrogen 357.2      [04-05-20 @ 22:50]  Urine Potassium 39.1      [04-08-20 @ 06:00]  Urine Chloride < 20      [04-08-20 @ 06:00]    Ferritin 815.9      [04-07-20 @ 10:15]  HbA1c 6.4      [04-05-20 @ 06:20]    HBsAg NEGATIVE      [04-05-20 @ 12:00]  HCV 0.21, Nonreactive Hepatitis C AB  S/CO Ratio                        Interpretation  < 1.00                                   Non-Reactive  1.00 - 4.99                         Weakly-Reactive  >= 5.00                                Reactive  Non-Reactive: Aperson with a non-reactive HCV antibody  result is considered uninfected.  No further action is  needed unless recent infection is suspected.  In these  cases, consider repeat testing later to detect  seroconversion..  Weakly-Reactive: HCV antibody test is abnormal, HCV RNA  Qualitative test will follow.  Reactive: HCV antibody test is abnormal, HCV RNA  Qualitative test will follow.  Note: HCV antibody testing is performed on the [employer] system.      [04-05-20 @ 06:20]    C3 Complement 156.1      [04-05-20 @ 12:00]  C4 Complement 41.5      [04-05-20 @ 12:00] [facility] Division of Kidney Diseases & Hypertension  FOLLOW UP NOTE  [phone]--------------------------------------------------------------------------------    HPI: 73 yo F with RA c/b chronic bronchiectasis, HTN, and DM2 is admitted with SOB. Pt. currently COVID-19 rule out. Nephrology team is consulted for NBA and hyperkalemia. On admission (4/4/20), Scr was elevated to 2.82, which as worsened to 3.31 on 4/5/20. Serum potassium was elevated to 7.5 on admission, and despite multiple rounds of medical management, remained elevated at 6.9, so patient was taken for urgent HD on 4/6/20. Patient was in HD yesterday but developed Aflutter within 10 minutes of HD and continued to have bleeding from catheter site so HD treatment was stopped. Patient continues to have good urine output.    Pt had HD session today without UF removal  Received 1 unit PRBC    PAST HISTORY  --------------------------------------------------------------------------------  No significant changes to PMH, PSH, FHx, SHx, unless otherwise noted    ALLERGIES & MEDICATIONS  --------------------------------------------------------------------------------  Allergies    No Known Allergies    Intolerances      Standing Inpatient Medications  cefTRIAXone   IVPB 1000 milliGRAM(s) IV Intermittent every 24 hours  dextrose 5%. 1000 milliLiter(s) IV Continuous <Continuous>  dextrose 50% Injectable 12.5 Gram(s) IV Push once  dextrose 50% Injectable 12.5 Gram(s) IV Push once  heparin  Injectable 5000 Unit(s) SubCutaneous every 12 hours  insulin lispro (HumaLOG) corrective regimen sliding scale   SubCutaneous Before meals and at bedtime  predniSONE   Tablet 17.5 milliGRAM(s) Oral daily    PRN Inpatient Medications  acetaminophen   Tablet .. 650 milliGRAM(s) Oral every 6 hours PRN  dextrose 40% Gel 15 Gram(s) Oral once PRN  dextrose 40% Gel 15 Gram(s) Oral once PRN  glucagon  Injectable 1 milliGRAM(s) IntraMuscular once PRN  midodrine. 10 milliGRAM(s) Oral <User Schedule> PRN  ondansetron Injectable 4 milliGRAM(s) IV Push every 8 hours PRN  simethicone 80 milliGRAM(s) Chew two times a day PRN      REVIEW OF SYSTEMS  --------------------------------------------------------------------------------  Gen: no fatigue  Respiratory: no dyspnea  CV: No chest pain  GI: + decreased oral intake  MSK: + LE edema  Neuro: No dizziness  Heme: No bleeding    All other systems were reviewed and are negative, except as noted.  VITALS/PHYSICAL EXAM  --------------------------------------------------------------------------------  T(C): 36.6 (04-11-20 @ 10:15), Max: 37 (04-10-20 @ 19:05)  HR: 82 (04-11-20 @ 10:15) (74 - 110)  BP: 119/58 (04-11-20 @ 10:15) (105/69 - 146/63)  RR: 18 (04-11-20 @ 10:15) (16 - 19)  SpO2: 100% (04-11-20 @ 04:54) (97% - 100%)  Wt(kg): --        04-11-20 @ 07:01  -  04-11-20 @ 12:09  --------------------------------------------------------  IN: 400 mL / OUT: 1140 mL / NET: -740 mL    Physical Exam:  	Gen: NAD  	HEENT: supple neck  	Pulm: diminished bibasilar breath sounds  	CV: RRR, S1S2; no rub  	Abd: +BS, soft  	: + Cevallos catheter   	LE: Pitting edema, ? lymphedema b/l  	Skin: Right dorsal foot wound  	Vascular access: LIJ non tunneled HD catheter, no bleeding noted    LABS/STUDIES  --------------------------------------------------------------------------------              7.0    17.06 >-----------<  134      [04-11-20 @ 08:00]              23.3     141  |  102  |  99  ----------------------------<  117      [04-11-20 @ 08:00]  5.7   |  25  |  2.81        Ca     8.6     [04-11-20 @ 08:00]      Mg     2.1     [04-11-20 @ 08:00]      Phos  6.1     [04-11-20 @ 08:00]          CK 52      [04-10-20 @ 16:00]    Creatinine Trend:  SCr 2.81 [04-11 @ 08:00]  SCr 3.13 [04-09 @ 07:10]  SCr 2.94 [04-08 @ 13:02]  SCr 2.99 [04-07 @ 10:15]  SCr 3.06 [04-06 @ 21:26]    Urinalysis - [04-04-20 @ 21:00]      Color YELLOW / Appearance CLEAR / SG 1.019 / pH 6.0      Gluc NEGATIVE / Ketone NEGATIVE  / Bili NEGATIVE / Urobili 0.2       Blood NEGATIVE / Protein MODERATE / Leuk Est SMALL / Nitrite NEGATIVE      RBC NONE / WBC 2-5 / Hyaline  / Gran  / Sq Epi  / Non Sq Epi FEW / Bacteria MODERATE    Urine Creatinine 68.10      [04-08-20 @ 06:00]  Urine Protein 41.9      [04-08-20 @ 06:00]  Urine Sodium < 20      [04-08-20 @ 06:00]  Urine Urea Nitrogen 357.2      [04-05-20 @ 22:50]  Urine Potassium 39.1      [04-08-20 @ 06:00]  Urine Chloride < 20      [04-08-20 @ 06:00]    Ferritin 815.9      [04-07-20 @ 10:15]  HbA1c 6.4      [04-05-20 @ 06:20]    HBsAg NEGATIVE      [04-05-20 @ 12:00]  HCV 0.21, Nonreactive Hepatitis C AB  S/CO Ratio                        Interpretation  < 1.00                                   Non-Reactive  1.00 - 4.99                         Weakly-Reactive  >= 5.00                                Reactive  Non-Reactive: Aperson with a non-reactive HCV antibody  result is considered uninfected.  No further action is  needed unless recent infection is suspected.  In these  cases, consider repeat testing later to detect  seroconversion..  Weakly-Reactive: HCV antibody test is abnormal, HCV RNA  Qualitative test will follow.  Reactive: HCV antibody test is abnormal, HCV RNA  Qualitative test will follow.  Note: HCV antibody testing is performed on the [employer]   system.      [04-05-20 @ 06:20]    C3 Complement 156.1      [04-05-20 @ 12:00]  C4 Complement 41.5      [04-05-20 @ 12:00]

## 2020-04-11 NOTE — PROGRESS NOTE ADULT - ATTENDING COMMENTS
Tried calling  Javid at [phone], no answer or voicemail available. Tried calling  Javid at [phone] to update him of patient's status, no answer or voicemail available.

## 2020-04-11 NOTE — PROGRESS NOTE ADULT - PROBLEM SELECTOR PLAN 1
Pt. initially presented with severe hyperkalemia in the setting of NBA. Pt. noted to have elevated serum potassium of 7.5 on admission (4/4/20). Received medical management but serum potassium remained 6.9. Patient went for urgent HD 4/6/20. Patient non oliguric now, Serum potassium 5.7 today. Will plan for HD today.  Low potassium diet. Monitor urine output.  Monitor serum potassium

## 2020-04-11 NOTE — PROGRESS NOTE ADULT - PROBLEM SELECTOR PLAN 9
-Incidental finding of large 16cm tail of pancreas mass - will need EUS/FNB  -But will hold off for now given NBA, need for HD, and bacteremia  -Will consult GI eventually for workup of this lesion once other more acute issues have resolved early next week

## 2020-04-11 NOTE — PROGRESS NOTE ADULT - PROBLEM SELECTOR PLAN 7
-Likely due to bleeding from Shiley  -No evidence of active bleeding  -Will give 1 more PRBC unit with next HD session

## 2020-04-11 NOTE — PROGRESS NOTE ADULT - PROBLEM SELECTOR PLAN 2
Pt. with likely NBA in the setting of infection, low BP, and decreased oral intake. Pt. however with possibly CKD in the setting of long-standing history of DM and HTN. Pt. with normal Scr of 0.96 on 6/7/16, no recent labs. On admission (4/4/20), Scr was elevated to 2.82, which worsened to 3.31 on 4/5/20, today pt with SCr: 2.8. Pt. hypoalbuminemic and with protein on UA. Urine electrolytes consistent with pre-renal. Encourage PO intake. Monitor labs and urine output    If any questions, please feel free to contact me  Oliver Ricci   Nephrology Fellow  [phone]  (After 5 pm or on weekends please page the on-call fellow)

## 2020-04-12 LAB
ANION GAP SERPL CALC-SCNC: 11 MMO/L — SIGNIFICANT CHANGE UP (ref 7–14)
BASOPHILS # BLD AUTO: 0.05 K/UL — SIGNIFICANT CHANGE UP (ref 0–0.2)
BASOPHILS NFR BLD AUTO: 0.4 % — SIGNIFICANT CHANGE UP (ref 0–2)
BUN SERPL-MCNC: 59 MG/DL — HIGH (ref 7–23)
CALCIUM SERPL-MCNC: 8.5 MG/DL — SIGNIFICANT CHANGE UP (ref 8.4–10.5)
CHLORIDE SERPL-SCNC: 98 MMOL/L — SIGNIFICANT CHANGE UP (ref 98–107)
CK MB BLD-MCNC: 1.82 NG/ML — SIGNIFICANT CHANGE UP (ref 1–4.7)
CK MB BLD-MCNC: < 1 NG/ML — LOW (ref 1–4.7)
CK MB BLD-MCNC: SIGNIFICANT CHANGE UP (ref 0–2.5)
CK MB BLD-MCNC: SIGNIFICANT CHANGE UP (ref 0–2.5)
CK SERPL-CCNC: 41 U/L — SIGNIFICANT CHANGE UP (ref 25–170)
CK SERPL-CCNC: 56 U/L — SIGNIFICANT CHANGE UP (ref 25–170)
CO2 SERPL-SCNC: 27 MMOL/L — SIGNIFICANT CHANGE UP (ref 22–31)
CREAT SERPL-MCNC: 2.36 MG/DL — HIGH (ref 0.5–1.3)
CULTURE RESULTS: SIGNIFICANT CHANGE UP
EOSINOPHIL # BLD AUTO: 0.02 K/UL — SIGNIFICANT CHANGE UP (ref 0–0.5)
EOSINOPHIL NFR BLD AUTO: 0.1 % — SIGNIFICANT CHANGE UP (ref 0–6)
GLUCOSE BLDC GLUCOMTR-MCNC: 127 MG/DL — HIGH (ref 70–99)
GLUCOSE BLDC GLUCOMTR-MCNC: 132 MG/DL — HIGH (ref 70–99)
GLUCOSE BLDC GLUCOMTR-MCNC: 138 MG/DL — HIGH (ref 70–99)
GLUCOSE BLDC GLUCOMTR-MCNC: 138 MG/DL — HIGH (ref 70–99)
GLUCOSE BLDC GLUCOMTR-MCNC: 163 MG/DL — HIGH (ref 70–99)
GLUCOSE BLDC GLUCOMTR-MCNC: 76 MG/DL — SIGNIFICANT CHANGE UP (ref 70–99)
GLUCOSE SERPL-MCNC: 129 MG/DL — HIGH (ref 70–99)
GRAM STN FLD: SIGNIFICANT CHANGE UP
HCT VFR BLD CALC: 24.4 % — LOW (ref 34.5–45)
HGB BLD-MCNC: 7.4 G/DL — LOW (ref 11.5–15.5)
IMM GRANULOCYTES NFR BLD AUTO: 5.2 % — HIGH (ref 0–1.5)
LYMPHOCYTES # BLD AUTO: 0.53 K/UL — LOW (ref 1–3.3)
LYMPHOCYTES # BLD AUTO: 4 % — LOW (ref 13–44)
MAGNESIUM SERPL-MCNC: 2 MG/DL — SIGNIFICANT CHANGE UP (ref 1.6–2.6)
MCHC RBC-ENTMCNC: 28.6 PG — SIGNIFICANT CHANGE UP (ref 27–34)
MCHC RBC-ENTMCNC: 30.3 % — LOW (ref 32–36)
MCV RBC AUTO: 94.2 FL — SIGNIFICANT CHANGE UP (ref 80–100)
MONOCYTES # BLD AUTO: 0.32 K/UL — SIGNIFICANT CHANGE UP (ref 0–0.9)
MONOCYTES NFR BLD AUTO: 2.4 % — SIGNIFICANT CHANGE UP (ref 2–14)
NEUTROPHILS # BLD AUTO: 11.78 K/UL — HIGH (ref 1.8–7.4)
NEUTROPHILS NFR BLD AUTO: 87.9 % — HIGH (ref 43–77)
NRBC # FLD: 0.09 K/UL — SIGNIFICANT CHANGE UP (ref 0–0)
PHOSPHATE SERPL-MCNC: 5.1 MG/DL — HIGH (ref 2.5–4.5)
PLATELET # BLD AUTO: 141 K/UL — LOW (ref 150–400)
PMV BLD: 12.2 FL — SIGNIFICANT CHANGE UP (ref 7–13)
POTASSIUM SERPL-MCNC: 4.8 MMOL/L — SIGNIFICANT CHANGE UP (ref 3.5–5.3)
POTASSIUM SERPL-SCNC: 4.8 MMOL/L — SIGNIFICANT CHANGE UP (ref 3.5–5.3)
RBC # BLD: 2.59 M/UL — LOW (ref 3.8–5.2)
RBC # FLD: 18 % — HIGH (ref 10.3–14.5)
SODIUM SERPL-SCNC: 136 MMOL/L — SIGNIFICANT CHANGE UP (ref 135–145)
SPECIMEN SOURCE: SIGNIFICANT CHANGE UP
TROPONIN T, HIGH SENSITIVITY: 212 NG/L — CRITICAL HIGH (ref ?–14)
TROPONIN T, HIGH SENSITIVITY: 215 NG/L — CRITICAL HIGH (ref ?–14)
WBC # BLD: 13.39 K/UL — HIGH (ref 3.8–10.5)
WBC # FLD AUTO: 13.39 K/UL — HIGH (ref 3.8–10.5)

## 2020-04-12 PROCEDURE — 99232 SBSQ HOSP IP/OBS MODERATE 35: CPT | Mod: CS

## 2020-04-12 RX ORDER — HEPARIN SODIUM 5000 [USP'U]/ML
5000 INJECTION INTRAVENOUS; SUBCUTANEOUS EVERY 12 HOURS
Refills: 0 | Status: DISCONTINUED | OUTPATIENT
Start: 2020-04-12 | End: 2020-04-14

## 2020-04-12 RX ORDER — DEXTROSE 50 % IN WATER 50 %
25 SYRINGE (ML) INTRAVENOUS ONCE
Refills: 0 | Status: COMPLETED | OUTPATIENT
Start: 2020-04-12 | End: 2020-04-12

## 2020-04-12 RX ADMIN — Medication 17.5 MILLIGRAM(S): at 07:23

## 2020-04-12 RX ADMIN — ONDANSETRON 4 MILLIGRAM(S): 8 TABLET, FILM COATED ORAL at 12:27

## 2020-04-12 RX ADMIN — HEPARIN SODIUM 5000 UNIT(S): 5000 INJECTION INTRAVENOUS; SUBCUTANEOUS at 07:22

## 2020-04-12 RX ADMIN — HEPARIN SODIUM 5000 UNIT(S): 5000 INJECTION INTRAVENOUS; SUBCUTANEOUS at 21:48

## 2020-04-12 RX ADMIN — Medication 1: at 12:25

## 2020-04-12 RX ADMIN — Medication 25 GRAM(S): at 01:50

## 2020-04-12 RX ADMIN — HEPARIN SODIUM 5000 UNIT(S): 5000 INJECTION INTRAVENOUS; SUBCUTANEOUS at 17:42

## 2020-04-12 RX ADMIN — CEFTRIAXONE 100 MILLIGRAM(S): 500 INJECTION, POWDER, FOR SOLUTION INTRAMUSCULAR; INTRAVENOUS at 17:41

## 2020-04-12 RX ADMIN — HYDROMORPHONE HYDROCHLORIDE 0.5 MILLIGRAM(S): 2 INJECTION INTRAMUSCULAR; INTRAVENOUS; SUBCUTANEOUS at 17:41

## 2020-04-12 NOTE — PROGRESS NOTE ADULT - SUBJECTIVE AND OBJECTIVE BOX
[facility] Division of Hospital Medicine Progress Note    Patient is a 74y old  Female who presents with a chief complaint of LE swelling (11 Apr 2020 13:26)      SUBJECTIVE / OVERNIGHT EVENTS:  ADDITIONAL REVIEW OF SYSTEMS:    MEDICATIONS  (STANDING):  cefTRIAXone   IVPB 1000 milliGRAM(s) IV Intermittent every 24 hours  dextrose 5%. 1000 milliLiter(s) (50 mL/Hr) IV Continuous <Continuous>  dextrose 50% Injectable 12.5 Gram(s) IV Push once  dextrose 50% Injectable 12.5 Gram(s) IV Push once  heparin  Injectable 5000 Unit(s) SubCutaneous every 12 hours  insulin lispro (HumaLOG) corrective regimen sliding scale   SubCutaneous Before meals and at bedtime  predniSONE   Tablet 17.5 milliGRAM(s) Oral daily    MEDICATIONS  (PRN):  acetaminophen   Tablet .. 650 milliGRAM(s) Oral every 6 hours PRN Mild Pain (1 - 3), Moderate Pain (4 - 6), Severe Pain (7 - 10)  dextrose 40% Gel 15 Gram(s) Oral once PRN Blood Glucose LESS THAN 70 milliGRAM(s)/deciliter  dextrose 40% Gel 15 Gram(s) Oral once PRN Blood Glucose LESS THAN 70 milliGRAM(s)/deciliter  glucagon  Injectable 1 milliGRAM(s) IntraMuscular once PRN Glucose LESS THAN 70 milligrams/deciliter  HYDROmorphone  Injectable 0.5 milliGRAM(s) IV Push every 8 hours PRN Severe Pain (7 - 10)  midodrine. 10 milliGRAM(s) Oral <User Schedule> PRN Pre-HD for Hypotension  ondansetron Injectable 4 milliGRAM(s) IV Push every 8 hours PRN Nausea and/or Vomiting  simethicone 80 milliGRAM(s) Chew two times a day PRN Upset Stomach      CAPILLARY BLOOD GLUCOSE  127 (12 Apr 2020 02:20)      POCT Blood Glucose.: 163 mg/dL (12 Apr 2020 12:09)  POCT Blood Glucose.: 138 mg/dL (12 Apr 2020 08:45)  POCT Blood Glucose.: 127 mg/dL (12 Apr 2020 02:17)  POCT Blood Glucose.: 76 mg/dL (12 Apr 2020 01:17)  POCT Blood Glucose.: 123 mg/dL (11 Apr 2020 17:24)    I&O's Summary    11 Apr 2020 07:01  -  12 Apr 2020 07:00  --------------------------------------------------------  IN: 400 mL / OUT: 1140 mL / NET: -740 mL        PHYSICAL EXAM:  Vital Signs Last 24 Hrs  T(C): 37.4 (12 Apr 2020 13:04), Max: 37.4 (12 Apr 2020 13:04)  T(F): 99.3 (12 Apr 2020 13:04), Max: 99.3 (12 Apr 2020 13:04)  HR: 89 (12 Apr 2020 13:04) (80 - 89)  BP: 122/80 (12 Apr 2020 13:04) (120/72 - 122/80)  BP(mean): --  RR: 19 (12 Apr 2020 13:04) (18 - 19)  SpO2: 98% (12 Apr 2020 13:04) (98% - 98%)  CONSTITUTIONAL: NAD, well-developed, well-groomed  ENMT: Moist oral mucosa, no pharyngeal injection or exudates; normal dentition  RESPIRATORY: Normal respiratory effort; lungs are clear to auscultation bilaterally  CARDIOVASCULAR: Regular rate and rhythm, normal S1 and S2, no murmur/rub/gallop; No lower extremity edema; Peripheral pulses are 2+ bilaterally  ABDOMEN: Nontender to palpation, normoactive bowel sounds, no rebound/guarding; No hepatosplenomegaly  PSYCH: A+O to person, place, and time; affect appropriate  NEUROLOGY: CN 2-12 are intact and symmetric; no gross sensory deficits   SKIN: No rashes; no palpable lesions    LABS:                        7.4    13.39 )-----------( 141      ( 12 Apr 2020 07:20 )             24.4     04-12    136  |  98  |  59<H>  ----------------------------<  129<H>  4.8   |  27  |  2.36<H>    Ca    8.5      12 Apr 2020 07:20  Phos  5.1     04-12  Mg     2.0     04-12        CARDIAC MARKERS ( 12 Apr 2020 07:20 )  x     / x     / 41 u/L / 1.82 ng/mL / x              Culture - Blood (collected 11 Apr 2020 14:47)  Source: .Blood Blood-Venous  Preliminary Report (12 Apr 2020 15:01):    No growth to date.      COVID-19 PCR: NotDetec (04-05-20 @ 02:56)      RADIOLOGY & ADDITIONAL TESTS:  Imaging from Last 24 Hours:  Electrocardiogram/QTc Interval:    COORDINATION OF CARE:  Care Discussed with Consultants/Other Providers: Patient is a 74y old  Female who presented  with a chief complaint of LE swelling (11 Apr 2020 13:26)      SUBJECTIVE / OVERNIGHT EVENTS: no acute events  ADDITIONAL REVIEW OF SYSTEMS: no dyspnea, abdominal pain and diarrhea resolved, but still has poor appetite, no vomiting or GI bleeding s/p HD yesterday Hb 7.4     MEDICATIONS  (STANDING):  cefTRIAXone   IVPB 1000 milliGRAM(s) IV Intermittent every 24 hours  dextrose 5%. 1000 milliLiter(s) (50 mL/Hr) IV Continuous <Continuous>  dextrose 50% Injectable 12.5 Gram(s) IV Push once  dextrose 50% Injectable 12.5 Gram(s) IV Push once  heparin  Injectable 5000 Unit(s) SubCutaneous every 12 hours  insulin lispro (HumaLOG) corrective regimen sliding scale   SubCutaneous Before meals and at bedtime  predniSONE   Tablet 17.5 milliGRAM(s) Oral daily    MEDICATIONS  (PRN):  acetaminophen   Tablet .. 650 milliGRAM(s) Oral every 6 hours PRN Mild Pain (1 - 3), Moderate Pain (4 - 6), Severe Pain (7 - 10)  dextrose 40% Gel 15 Gram(s) Oral once PRN Blood Glucose LESS THAN 70 milliGRAM(s)/deciliter  dextrose 40% Gel 15 Gram(s) Oral once PRN Blood Glucose LESS THAN 70 milliGRAM(s)/deciliter  glucagon  Injectable 1 milliGRAM(s) IntraMuscular once PRN Glucose LESS THAN 70 milligrams/deciliter  HYDROmorphone  Injectable 0.5 milliGRAM(s) IV Push every 8 hours PRN Severe Pain (7 - 10)  midodrine. 10 milliGRAM(s) Oral <User Schedule> PRN Pre-HD for Hypotension  ondansetron Injectable 4 milliGRAM(s) IV Push every 8 hours PRN Nausea and/or Vomiting  simethicone 80 milliGRAM(s) Chew two times a day PRN Upset Stomach      CAPILLARY BLOOD GLUCOSE  127 (12 Apr 2020 02:20)      POCT Blood Glucose.: 163 mg/dL (12 Apr 2020 12:09)  POCT Blood Glucose.: 138 mg/dL (12 Apr 2020 08:45)  POCT Blood Glucose.: 127 mg/dL (12 Apr 2020 02:17)  POCT Blood Glucose.: 76 mg/dL (12 Apr 2020 01:17)  POCT Blood Glucose.: 123 mg/dL (11 Apr 2020 17:24)    I&O's Summary    11 Apr 2020 07:01  -  12 Apr 2020 07:00  --------------------------------------------------------  IN: 400 mL / OUT: 1140 mL / NET: -740 mL        PHYSICAL EXAM:  Vital Signs Last 24 Hrs  T(C): 37.4 (12 Apr 2020 13:04), Max: 37.4 (12 Apr 2020 13:04)  T(F): 99.3 (12 Apr 2020 13:04), Max: 99.3 (12 Apr 2020 13:04)  HR: 89 (12 Apr 2020 13:04) (80 - 89)  BP: 122/80 (12 Apr 2020 13:04) (120/72 - 122/80)  BP(mean): --  RR: 19 (12 Apr 2020 13:04) (18 - 19)  SpO2: 98% (12 Apr 2020 13:04) (98% - 98%)  CONSTITUTIONAL: NAD, well-developed, well-groomed  RESPIRATORY: Normal respiratory effort; lungs are clear to auscultation bilaterally  CARDIOVASCULAR: Regular rate and rhythm, normal S1 and S2, bilateral leg edema.  ABDOMEN: Nontender to palpation,   PSYCH/NEUROLOGY: awake, alert and oriented x3, no grossly neuro deficits   SKIN: Normal skin turgor   LABS:                        7.4    13.39 )-----------( 141      ( 12 Apr 2020 07:20 )             24.4     04-12    136  |  98  |  59<H>  ----------------------------<  129<H>  4.8   |  27  |  2.36<H>    Ca    8.5      12 Apr 2020 07:20  Phos  5.1     04-12  Mg     2.0     04-12        CARDIAC MARKERS ( 12 Apr 2020 07:20 )  x     / x     / 41 u/L / 1.82 ng/mL / x              Culture - Blood (collected 11 Apr 2020 14:47)  Source: .Blood Blood-Venous  Preliminary Report (12 Apr 2020 15:01):    No growth to date.      COVID-19 PCR: NotDetec (04-05-20 @ 02:56)      RADIOLOGY & ADDITIONAL TESTS:  Imaging from Last 24 Hours:  Electrocardiogram/QTc Interval:    COORDINATION OF CARE:  Care Discussed with Consultants/Other Providers: Patient is a 74y old  Female who presented  with a chief complaint of LE swelling (11 Apr 2020 13:26)      SUBJECTIVE / OVERNIGHT EVENTS: no acute events  ADDITIONAL REVIEW OF SYSTEMS: no dyspnea, abdominal pain and diarrhea resolved, but still has poor appetite, no vomiting or GI bleeding, no evidence of bleeding,  s/p HD yesterday Hb 7.4     MEDICATIONS  (STANDING):  cefTRIAXone   IVPB 1000 milliGRAM(s) IV Intermittent every 24 hours  dextrose 5%. 1000 milliLiter(s) (50 mL/Hr) IV Continuous <Continuous>  dextrose 50% Injectable 12.5 Gram(s) IV Push once  dextrose 50% Injectable 12.5 Gram(s) IV Push once  heparin  Injectable 5000 Unit(s) SubCutaneous every 12 hours  insulin lispro (HumaLOG) corrective regimen sliding scale   SubCutaneous Before meals and at bedtime  predniSONE   Tablet 17.5 milliGRAM(s) Oral daily    MEDICATIONS  (PRN):  acetaminophen   Tablet .. 650 milliGRAM(s) Oral every 6 hours PRN Mild Pain (1 - 3), Moderate Pain (4 - 6), Severe Pain (7 - 10)  dextrose 40% Gel 15 Gram(s) Oral once PRN Blood Glucose LESS THAN 70 milliGRAM(s)/deciliter  dextrose 40% Gel 15 Gram(s) Oral once PRN Blood Glucose LESS THAN 70 milliGRAM(s)/deciliter  glucagon  Injectable 1 milliGRAM(s) IntraMuscular once PRN Glucose LESS THAN 70 milligrams/deciliter  HYDROmorphone  Injectable 0.5 milliGRAM(s) IV Push every 8 hours PRN Severe Pain (7 - 10)  midodrine. 10 milliGRAM(s) Oral <User Schedule> PRN Pre-HD for Hypotension  ondansetron Injectable 4 milliGRAM(s) IV Push every 8 hours PRN Nausea and/or Vomiting  simethicone 80 milliGRAM(s) Chew two times a day PRN Upset Stomach      CAPILLARY BLOOD GLUCOSE  127 (12 Apr 2020 02:20)      POCT Blood Glucose.: 163 mg/dL (12 Apr 2020 12:09)  POCT Blood Glucose.: 138 mg/dL (12 Apr 2020 08:45)  POCT Blood Glucose.: 127 mg/dL (12 Apr 2020 02:17)  POCT Blood Glucose.: 76 mg/dL (12 Apr 2020 01:17)  POCT Blood Glucose.: 123 mg/dL (11 Apr 2020 17:24)    I&O's Summary    11 Apr 2020 07:01  -  12 Apr 2020 07:00  --------------------------------------------------------  IN: 400 mL / OUT: 1140 mL / NET: -740 mL        PHYSICAL EXAM:  Vital Signs Last 24 Hrs  T(C): 37.4 (12 Apr 2020 13:04), Max: 37.4 (12 Apr 2020 13:04)  T(F): 99.3 (12 Apr 2020 13:04), Max: 99.3 (12 Apr 2020 13:04)  HR: 89 (12 Apr 2020 13:04) (80 - 89)  BP: 122/80 (12 Apr 2020 13:04) (120/72 - 122/80)  BP(mean): --  RR: 19 (12 Apr 2020 13:04) (18 - 19)  SpO2: 98% (12 Apr 2020 13:04) (98% - 98%)  CONSTITUTIONAL: NAD, well-developed, well-groomed  RESPIRATORY: Normal respiratory effort; lungs are clear to auscultation bilaterally  CARDIOVASCULAR: Regular rate and rhythm, normal S1 and S2, bilateral leg edema.  ABDOMEN: Nontender to palpation,   PSYCH/NEUROLOGY: awake, alert and oriented x3, no grossly neuro deficits   SKIN: Normal skin turgor   LABS:                        7.4    13.39 )-----------( 141      ( 12 Apr 2020 07:20 )             24.4     04-12    136  |  98  |  59<H>  ----------------------------<  129<H>  4.8   |  27  |  2.36<H>    Ca    8.5      12 Apr 2020 07:20  Phos  5.1     04-12  Mg     2.0     04-12        CARDIAC MARKERS ( 12 Apr 2020 07:20 )  x     / x     / 41 u/L / 1.82 ng/mL / x              Culture - Blood (collected 11 Apr 2020 14:47)  Source: .Blood Blood-Venous  Preliminary Report (12 Apr 2020 15:01):    No growth to date.      COVID-19 PCR: NotDetec (04-05-20 @ 02:56)      RADIOLOGY & ADDITIONAL TESTS:  Imaging from Last 24 Hours:  Electrocardiogram/QTc Interval:    COORDINATION OF CARE:  Care Discussed with Consultants/Other Providers:

## 2020-04-12 NOTE — CONSULT NOTE ADULT - SUBJECTIVE AND OBJECTIVE BOX
CHIEF COMPLAINT:    HISTORY OF PRESENT ILLNESS:  74 y.o F w/ PMH HLD, HTN, insulin dependent T2DM, RA (on chronic prednisone, leflunomide, and hydroxychloroquine) c/b bronchiectasis (on home O2 4 L NC) who p/w worsening LE swelling. Patient has been she experiencing weakness of her LE 2/2 her RA for a few weeks. Her prednisone dose was increased to 20 mg but she started to notice worsening LE swelling and SOB. She admits to orthopnea. She called her PMD who started her on lasix 20 daily w/ some improvement in her LE swelling but she continued to have SOB. She also admit to one watery BM a day which is unusual for her. Her PMD advised her to go to the ED. She denies any CP, palpitations, fever/chills, cough, sick contacts, or recent travel.     Allergies    No Known Allergies    Intolerances    MEDICATIONS:  heparin  Injectable 5000 Unit(s) SubCutaneous every 12 hours  midodrine. 10 milliGRAM(s) Oral <User Schedule> PRN  cefTRIAXone   IVPB 1000 milliGRAM(s) IV Intermittent every 24 hours  acetaminophen   Tablet .. 650 milliGRAM(s) Oral every 6 hours PRN  HYDROmorphone  Injectable 0.5 milliGRAM(s) IV Push every 8 hours PRN  ondansetron Injectable 4 milliGRAM(s) IV Push every 8 hours PRN  simethicone 80 milliGRAM(s) Chew two times a day PRN  insulin lispro (HumaLOG) corrective regimen sliding scale   SubCutaneous Before meals and at bedtime  predniSONE   Tablet 17.5 milliGRAM(s) Oral daily        PAST MEDICAL & SURGICAL HISTORY:  Hyperlipidemia  Bronchiectasis  Type 2 diabetes mellitus  Hypertension  Rheumatoid arthritis  No significant past surgical history      FAMILY HISTORY:  No pertinent family history in first degree relatives      SOCIAL HISTORY:    [x] Non-smoker  [ ] Smoker  [ ] Alcohol  [x] Denies Alcohol      REVIEW OF SYSTEMS:  CONSTITUTIONAL: no fevers or chills  EYES/ENT: No visual changes; No vertigo  NECK: No JVD  RESPIRATORY:  No cough, wheezing, chills or hemoptysis, + SOB  CARDIOVASCULAR: No chest pain, palpitations, passing out, dizziness, + leg swelling  GASTROINTESTINAL: No abdominal or epigastric pain. No nausea/vomiting/melena  Genitourinary: No dysuria, frequency or hematuria  NEUROLOGICAL: No numbness or weakness  SKIN: No itching, burning, rashes or lesions      PHYSICAL EXAM:  T(C): 37.4 (04-12-20 @ 13:04), Max: 37.4 (04-12-20 @ 13:04)  HR: 89 (04-12-20 @ 13:04) (80 - 89)  BP: 122/80 (04-12-20 @ 13:04) (120/72 - 122/80)  RR: 19 (04-12-20 @ 13:04) (18 - 19)  SpO2: 98% (04-12-20 @ 13:04) (98% - 98%)    Vital Signs Last 24 Hrs  T(C): 37.4 (12 Apr 2020 13:04), Max: 37.4 (12 Apr 2020 13:04)  T(F): 99.3 (12 Apr 2020 13:04), Max: 99.3 (12 Apr 2020 13:04)  HR: 89 (12 Apr 2020 13:04) (80 - 89)  BP: 122/80 (12 Apr 2020 13:04) (120/72 - 122/80)  RR: 19 (12 Apr 2020 13:04) (18 - 19)  SpO2: 98% (12 Apr 2020 13:04) (98% - 98%)    I&O's Summary    11 Apr 2020 07:01  -  12 Apr 2020 07:00  --------------------------------------------------------  IN: 400 mL / OUT: 1140 mL / NET: -740 mL    Appearance: Normal	  HEENT:   Normal oral mucosa	  Cardiovascular: IRREG S1 S2, No JVD, No murmurs, + edema  Respiratory: Dec. BS  Psychiatry: A & O x 3, Mood & affect appropriate  Gastrointestinal:  Soft, Non-tender, + BS	  Skin: + rashes	  Neurologic: Non-focal  Extremities: Warm, + edema, ulcers         LABS:	 	    CBC Full  -  ( 12 Apr 2020 07:20 )  WBC Count : 13.39 K/uL  Hemoglobin : 7.4 g/dL  Hematocrit : 24.4 %  Platelet Count - Automated : 141 K/uL  Mean Cell Volume : 94.2 fL  Mean Cell Hemoglobin : 28.6 pg  Mean Cell Hemoglobin Concentration : 30.3 %  Auto Neutrophil # : 11.78 K/uL  Auto Lymphocyte # : 0.53 K/uL  Auto Monocyte # : 0.32 K/uL  Auto Eosinophil # : 0.02 K/uL  Auto Basophil # : 0.05 K/uL  Auto Neutrophil % : 87.9 %  Auto Lymphocyte % : 4.0 %  Auto Monocyte % : 2.4 %  Auto Eosinophil % : 0.1 %  Auto Basophil % : 0.4 %    04-12    136  |  98  |  59<H>  ----------------------------<  129<H>  4.8   |  27  |  2.36<H>  04-11    141  |  102  |  99<H>  ----------------------------<  117<H>  5.7<H>   |  25  |  2.81<H>    Ca    8.5      12 Apr 2020 07:20  Ca    8.6      11 Apr 2020 08:00  Phos  5.1     04-12  Phos  6.1     04-11  Mg     2.0     04-12  Mg     2.1     04-11        proBNP: 2753  HgA1c: 6.4  TSH: none     CARDIAC MARKERS:  , 215, 191  CK: 41  CKMB: 1.82      TELEMETRY: A fib 115	    ECG: NSR with non specific changes  	    PREVIOUS DIAGNOSTIC TESTING:      DIMENSIONS:  Dimensions:     Normal Values:  LA:     3.3 cm    2.0 - 4.0 cm  Ao:     2.8 cm    2.0 - 3.8 cm  SEPTUM: 0.8 cm    0.6- 1.2 cm  PWT:    0.9 cm    0.6 - 1.1 cm  LVIDd:  4.4 cm    3.0 - 5.6 cm  LVIDs:  3.0 cm    1.8 - 4.0 cm  Derived Variables:  LVMI: 58 g/m2  RWT: 0.40  Fractional short: 32 %  Ejection Fraction (Rambo): 60 %  ------------------------------------------------------------------------  OBSERVATIONS:  Mitral Valve: Mitral annular calcification, otherwise  normal mitral valve. Minimal mitral regurgitation.  Aortic Root: Normal aortic root.  Aortic Valve: Calcified trileaflet aortic valve with normal  opening. Minimal aortic regurgitation.  Left Atrium: Normal left atrium.  LA volume index = 26  cc/m2.  Left Ventricle: Endocardium not well visualized; grossly  normal left ventricular systolic function. Normal left  ventricular internal dimensions and wall thicknesses.  Right Heart: Normal right atrium. The right ventricle is  not well visualized; grossly normal right ventricular  systolic function. Normal tricuspid valve.  Mild tricuspid  regurgitation. Normal pulmonic valve. Minimal pulmonic  regurgitation.  Pericardium/PleuraNormal pericardium with no pericardial  effusion.  ------------------------------------------------------------------------  CONCLUSIONS:  1. Mitral annular calcification, otherwise normal mitral  valve. Minimal mitral regurgitation.  2. Normal left ventricular internal dimensions and wall  thicknesses.  3. Endocardium not well visualized; grossly normal left  ventricular systolic function.  4. The right ventricle is not well visualized; grossly  normal right ventricular systolic function.    Last stress test less than a year in Dr. Mays's office

## 2020-04-12 NOTE — CHART NOTE - NSCHARTNOTEFT_GEN_A_CORE
As per Dr. Love, hold Heparin subcut due to anemia, the patient needs b/l lower extremities venous doppler due to b/l lower extremities edema, if doppler is negative, will hold pharmacological VTE prophylaxis till hemoglobin is stable. The patient is anemic, Hgb 7.4. As per Dr. Love, continue Heparin subcut, the patient  needs b/l lower extremities venous doppler due to b/l lower extremities edema.

## 2020-04-12 NOTE — CONSULT NOTE ADULT - ASSESSMENT
74 y.o F w/ PMH HLD, HTN, insulin dependent T2DM, RA (on chronic prednisone, leflunomide, and hydroxychloroquine) c/b bronchiectasis (on home O2 4 L NC) who p/w worsening LE swelling. Patient has been she experiencing weakness of her LE 2/2 her RA for a few weeks. Her prednisone dose was increased to 20 mg but she started to notice worsening LE swelling and SOB. She admits to orthopnea. She called her PMD who started her on lasix 20 daily w/ some improvement in her LE swelling but she continued to have SOB. She also admit to one watery BM a day which is unusual for her. Her PMD advised her to go to the ED. She denies any CP, palpitations, fever/chills, cough, sick contacts, or recent travel.     Type II MI  - In the setting sepsis (E coli bacteremia) new pancreatic lesion  - Elevated HST can relate to NBA now on HD with negative CK  - TTE with grossly normal left ventricular systolic function  - Last stress test less than a year in Dr. Mays's office   - ECG NSR with non ST specific changes   - ASA 81 mg PO daily   - Low suspicion for ACS    A fib  - Start Lopressor 12.5 mg BID ( Hold for SBP< 90 HR< 70)  - NAHOMI-VASc score 4   - s/p blood transfusions. If no contraindication to AC, start heparin gtt  - Chest TSH

## 2020-04-12 NOTE — PROGRESS NOTE ADULT - ASSESSMENT
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  Acute renal failure, unspecified acute renal failure type.- s/p NaHCO3 drip, s/p urgent dialysis, last dialyzed yesterday. s/p  bleeding from catheter site. No more bleeding for the site  Anemia- s/p acute blood loss from catheter, s/p  DDAVP, s/p blood transfusion for recurrenet anemia. 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 -On SQ heparin; no signs of active bleeding now. 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  Acute renal failure, unspecified acute renal failure type.- s/p NaHCO3 drip, s/p urgent dialysis, last dialyzed yesterday. s/p  bleeding from catheter site. No more bleeding at the site  Anemia- s/p acute blood loss from catheter, s/p  DDAVP, s/p blood transfusion for recurrenet anemia. 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 -On SQ heparin; no signs of active bleeding now.

## 2020-04-13 LAB
ANION GAP SERPL CALC-SCNC: 16 MMO/L — HIGH (ref 7–14)
BASOPHILS # BLD AUTO: 0.04 K/UL — SIGNIFICANT CHANGE UP (ref 0–0.2)
BASOPHILS NFR BLD AUTO: 0.3 % — SIGNIFICANT CHANGE UP (ref 0–2)
BLD GP AB SCN SERPL QL: NEGATIVE — SIGNIFICANT CHANGE UP
BUN SERPL-MCNC: 66 MG/DL — HIGH (ref 7–23)
CALCIUM SERPL-MCNC: 8.6 MG/DL — SIGNIFICANT CHANGE UP (ref 8.4–10.5)
CHLORIDE SERPL-SCNC: 101 MMOL/L — SIGNIFICANT CHANGE UP (ref 98–107)
CK MB BLD-MCNC: 1.85 NG/ML — SIGNIFICANT CHANGE UP (ref 1–4.7)
CK MB BLD-MCNC: SIGNIFICANT CHANGE UP (ref 0–2.5)
CK SERPL-CCNC: 44 U/L — SIGNIFICANT CHANGE UP (ref 25–170)
CO2 SERPL-SCNC: 26 MMOL/L — SIGNIFICANT CHANGE UP (ref 22–31)
CREAT SERPL-MCNC: 2.56 MG/DL — HIGH (ref 0.5–1.3)
CULTURE RESULTS: SIGNIFICANT CHANGE UP
EOSINOPHIL # BLD AUTO: 0.06 K/UL — SIGNIFICANT CHANGE UP (ref 0–0.5)
EOSINOPHIL NFR BLD AUTO: 0.4 % — SIGNIFICANT CHANGE UP (ref 0–6)
GLUCOSE BLDC GLUCOMTR-MCNC: 113 MG/DL — HIGH (ref 70–99)
GLUCOSE BLDC GLUCOMTR-MCNC: 146 MG/DL — HIGH (ref 70–99)
GLUCOSE SERPL-MCNC: 118 MG/DL — HIGH (ref 70–99)
HCT VFR BLD CALC: 23.6 % — LOW (ref 34.5–45)
HCT VFR BLD CALC: 26.7 % — LOW (ref 34.5–45)
HGB BLD-MCNC: 6.9 G/DL — CRITICAL LOW (ref 11.5–15.5)
HGB BLD-MCNC: 7.9 G/DL — LOW (ref 11.5–15.5)
IMM GRANULOCYTES NFR BLD AUTO: 5.7 % — HIGH (ref 0–1.5)
LDH SERPL L TO P-CCNC: 374 U/L — HIGH (ref 135–225)
LYMPHOCYTES # BLD AUTO: 0.59 K/UL — LOW (ref 1–3.3)
LYMPHOCYTES # BLD AUTO: 3.8 % — LOW (ref 13–44)
MAGNESIUM SERPL-MCNC: 2 MG/DL — SIGNIFICANT CHANGE UP (ref 1.6–2.6)
MANUAL SMEAR VERIFICATION: SIGNIFICANT CHANGE UP
MCHC RBC-ENTMCNC: 28.3 PG — SIGNIFICANT CHANGE UP (ref 27–34)
MCHC RBC-ENTMCNC: 28.7 PG — SIGNIFICANT CHANGE UP (ref 27–34)
MCHC RBC-ENTMCNC: 29.4 % — LOW (ref 32–36)
MCHC RBC-ENTMCNC: 29.6 % — LOW (ref 32–36)
MCV RBC AUTO: 96.3 FL — SIGNIFICANT CHANGE UP (ref 80–100)
MCV RBC AUTO: 97.1 FL — SIGNIFICANT CHANGE UP (ref 80–100)
MONOCYTES # BLD AUTO: 0.35 K/UL — SIGNIFICANT CHANGE UP (ref 0–0.9)
MONOCYTES NFR BLD AUTO: 2.3 % — SIGNIFICANT CHANGE UP (ref 2–14)
NEUTROPHILS # BLD AUTO: 13.45 K/UL — HIGH (ref 1.8–7.4)
NEUTROPHILS NFR BLD AUTO: 87.5 % — HIGH (ref 43–77)
NRBC # FLD: 0.02 K/UL — SIGNIFICANT CHANGE UP (ref 0–0)
NRBC # FLD: 0.04 K/UL — SIGNIFICANT CHANGE UP (ref 0–0)
PHOSPHATE SERPL-MCNC: 6 MG/DL — HIGH (ref 2.5–4.5)
PLATELET # BLD AUTO: 146 K/UL — LOW (ref 150–400)
PLATELET # BLD AUTO: 182 K/UL — SIGNIFICANT CHANGE UP (ref 150–400)
PMV BLD: 12 FL — SIGNIFICANT CHANGE UP (ref 7–13)
PMV BLD: 12 FL — SIGNIFICANT CHANGE UP (ref 7–13)
POTASSIUM SERPL-MCNC: 5.2 MMOL/L — SIGNIFICANT CHANGE UP (ref 3.5–5.3)
POTASSIUM SERPL-SCNC: 5.2 MMOL/L — SIGNIFICANT CHANGE UP (ref 3.5–5.3)
RBC # BLD: 2.44 M/UL — LOW (ref 3.8–5.2)
RBC # BLD: 2.75 M/UL — LOW (ref 3.8–5.2)
RBC # FLD: 17.2 % — HIGH (ref 10.3–14.5)
RBC # FLD: 17.5 % — HIGH (ref 10.3–14.5)
RH IG SCN BLD-IMP: POSITIVE — SIGNIFICANT CHANGE UP
SODIUM SERPL-SCNC: 143 MMOL/L — SIGNIFICANT CHANGE UP (ref 135–145)
SPECIMEN SOURCE: SIGNIFICANT CHANGE UP
TROPONIN T, HIGH SENSITIVITY: 194 NG/L — CRITICAL HIGH (ref ?–14)
WBC # BLD: 15.37 K/UL — HIGH (ref 3.8–10.5)
WBC # BLD: 20.19 K/UL — HIGH (ref 3.8–10.5)
WBC # FLD AUTO: 15.37 K/UL — HIGH (ref 3.8–10.5)
WBC # FLD AUTO: 20.19 K/UL — HIGH (ref 3.8–10.5)

## 2020-04-13 PROCEDURE — 99232 SBSQ HOSP IP/OBS MODERATE 35: CPT | Mod: GC

## 2020-04-13 PROCEDURE — 99232 SBSQ HOSP IP/OBS MODERATE 35: CPT

## 2020-04-13 PROCEDURE — 99232 SBSQ HOSP IP/OBS MODERATE 35: CPT | Mod: CS

## 2020-04-13 PROCEDURE — 99233 SBSQ HOSP IP/OBS HIGH 50: CPT

## 2020-04-13 PROCEDURE — 93971 EXTREMITY STUDY: CPT | Mod: 26,RT,59

## 2020-04-13 PROCEDURE — 93970 EXTREMITY STUDY: CPT | Mod: 26

## 2020-04-13 RX ORDER — ONDANSETRON 8 MG/1
4 TABLET, FILM COATED ORAL ONCE
Refills: 0 | Status: COMPLETED | OUTPATIENT
Start: 2020-04-13 | End: 2020-04-13

## 2020-04-13 RX ORDER — ACETAMINOPHEN 500 MG
650 TABLET ORAL EVERY 6 HOURS
Refills: 0 | Status: DISCONTINUED | OUTPATIENT
Start: 2020-04-13 | End: 2020-04-13

## 2020-04-13 RX ORDER — ACETAMINOPHEN 500 MG
650 TABLET ORAL EVERY 6 HOURS
Refills: 0 | Status: DISCONTINUED | OUTPATIENT
Start: 2020-04-13 | End: 2020-04-24

## 2020-04-13 RX ORDER — DIPHENHYDRAMINE HCL 50 MG
25 CAPSULE ORAL ONCE
Refills: 0 | Status: COMPLETED | OUTPATIENT
Start: 2020-04-13 | End: 2020-04-13

## 2020-04-13 RX ORDER — METOPROLOL TARTRATE 50 MG
12.5 TABLET ORAL
Refills: 0 | Status: DISCONTINUED | OUTPATIENT
Start: 2020-04-13 | End: 2020-04-19

## 2020-04-13 RX ADMIN — Medication 2: at 14:21

## 2020-04-13 RX ADMIN — Medication 25 MILLIGRAM(S): at 10:35

## 2020-04-13 RX ADMIN — HEPARIN SODIUM 5000 UNIT(S): 5000 INJECTION INTRAVENOUS; SUBCUTANEOUS at 18:23

## 2020-04-13 RX ADMIN — ONDANSETRON 4 MILLIGRAM(S): 8 TABLET, FILM COATED ORAL at 23:29

## 2020-04-13 RX ADMIN — Medication 12.5 MILLIGRAM(S): at 18:25

## 2020-04-13 RX ADMIN — Medication 17.5 MILLIGRAM(S): at 04:43

## 2020-04-13 RX ADMIN — Medication 650 MILLIGRAM(S): at 21:08

## 2020-04-13 RX ADMIN — HYDROMORPHONE HYDROCHLORIDE 0.5 MILLIGRAM(S): 2 INJECTION INTRAMUSCULAR; INTRAVENOUS; SUBCUTANEOUS at 03:18

## 2020-04-13 RX ADMIN — Medication 1: at 23:29

## 2020-04-13 RX ADMIN — HEPARIN SODIUM 5000 UNIT(S): 5000 INJECTION INTRAVENOUS; SUBCUTANEOUS at 04:42

## 2020-04-13 RX ADMIN — Medication 2: at 18:23

## 2020-04-13 NOTE — CHART NOTE - NSCHARTNOTEFT_GEN_A_CORE
The patient was noted to have Hgb 6.9.   The patient is stable. Shiley catheter is in place, no bleeding noted at the site. Dr. Bosch also examined the patient, RUE edema noted, recommended RUE venous doppler.

## 2020-04-13 NOTE — PROVIDER CONTACT NOTE (CRITICAL VALUE NOTIFICATION) - ACTION/TREATMENT ORDERED:
Patient to be transfused.
Will call nephrology and order stat BMP and blood cultures
continue to monitor
continue to monitor. await orders.
Patient pending to be transfused.
may transfuse on floor

## 2020-04-13 NOTE — PROGRESS NOTE ADULT - ASSESSMENT
Patient is a 73 yo woma with HLD, HTN, insulin dependent T2DM, RA (on chronic prednisone, leflunomide, and hydroxychloroquine) c/b bronchiectasis (on home O2 4 L NC) who p/w worsening LE swelling and was noted to be in atrial fibrillation.    Elevated troponins, in setting of NBA on HD, type II MI  - In the setting sepsis (E coli bacteremia) and new pancreatic tail cystic lesion  - Low suspicion for ACS, given clinical context and with underlying NBA now on HD with negative CK  - TTE with grossly normal left ventricular systolic function  - Last stress test less than a year in Dr. Mays's office was reportedly unremarkable   - ECG NSR with non ST specific changes   - Aspirin held    Atrial fibrillation  - NAHOMI-VASc score 4   - Rate controlled, not on medications at this time  - s/p blood transfusions. Not on anticoagulants at this time    Will follow.

## 2020-04-13 NOTE — PROGRESS NOTE ADULT - SUBJECTIVE AND OBJECTIVE BOX
Cardiology Attending Progress Note    Patient last seen by the Cardiology svc on 4/12/20  Since then, rate better controlled  Otherwise hemodynamically stable  Receiving IV ceftriaxone for E.coli bacteremia    Vital Signs Last 24 Hrs  T(C): 36.7 (13 Apr 2020 04:40), Max: 36.7 (13 Apr 2020 04:40)  T(F): 98 (13 Apr 2020 04:40), Max: 98 (13 Apr 2020 04:40)  HR: 95 (13 Apr 2020 04:40) (95 - 95)  BP: 120/79 (13 Apr 2020 04:40) (120/79 - 120/79)  BP(mean): --  RR: 19 (13 Apr 2020 04:40) (19 - 19)  SpO2: 97% (13 Apr 2020 04:40) (97% - 97%)    I&O's Detail    12 Apr 2020 07:01  -  13 Apr 2020 07:00  --------------------------------------------------------  IN:  Total IN: 0 mL    OUT:    Indwelling Catheter - Urethral: 400 mL  Total OUT: 400 mL    Total NET: -400 mL      Appearance: Normal	  Cardiovascular: IRREG S1 S2, No JVD, No murmurs, + edema  Respiratory: Decreased breath sounds bilaterally  Psychiatry: Awake, alert    MEDICATIONS  (STANDING):  cefTRIAXone   IVPB 1000 milliGRAM(s) IV Intermittent every 24 hours  dextrose 5%. 1000 milliLiter(s) (50 mL/Hr) IV Continuous <Continuous>  dextrose 50% Injectable 12.5 Gram(s) IV Push once  dextrose 50% Injectable 12.5 Gram(s) IV Push once  heparin  Injectable 5000 Unit(s) SubCutaneous every 12 hours  insulin lispro (HumaLOG) corrective regimen sliding scale   SubCutaneous Before meals and at bedtime  predniSONE   Tablet 17.5 milliGRAM(s) Oral daily    MEDICATIONS  (PRN):  acetaminophen   Tablet .. 650 milliGRAM(s) Oral every 6 hours PRN Mild Pain (1 - 3), Moderate Pain (4 - 6), Severe Pain (7 - 10)  dextrose 40% Gel 15 Gram(s) Oral once PRN Blood Glucose LESS THAN 70 milliGRAM(s)/deciliter  dextrose 40% Gel 15 Gram(s) Oral once PRN Blood Glucose LESS THAN 70 milliGRAM(s)/deciliter  glucagon  Injectable 1 milliGRAM(s) IntraMuscular once PRN Glucose LESS THAN 70 milligrams/deciliter  HYDROmorphone  Injectable 0.5 milliGRAM(s) IV Push every 8 hours PRN Severe Pain (7 - 10)  midodrine. 10 milliGRAM(s) Oral <User Schedule> PRN Pre-HD for Hypotension  ondansetron Injectable 4 milliGRAM(s) IV Push every 8 hours PRN Nausea and/or Vomiting  simethicone 80 milliGRAM(s) Chew two times a day PRN Upset Stomach    LABS:	 	                        6.9    15.37 )-----------( 146      ( 13 Apr 2020 04:00 )             23.6     04-13    143  |  101  |  66<H>  ----------------------------<  118<H>  5.2   |  26  |  2.56<H>    Ca    8.6      13 Apr 2020 04:00  Phos  6.0     04-13  Mg     2.0     04-13          PREVIOUS DIAGNOSTIC TESTING:      DIMENSIONS:  Dimensions:     Normal Values:  LA:     3.3 cm    2.0 - 4.0 cm  Ao:     2.8 cm    2.0 - 3.8 cm  SEPTUM: 0.8 cm    0.6- 1.2 cm  PWT:    0.9 cm    0.6 - 1.1 cm  LVIDd:  4.4 cm    3.0 - 5.6 cm  LVIDs:  3.0 cm    1.8 - 4.0 cm  Derived Variables:  LVMI: 58 g/m2  RWT: 0.40  Fractional short: 32 %  Ejection Fraction (Teicholtz): 60 %  ------------------------------------------------------------------------  OBSERVATIONS:  Mitral Valve: Mitral annular calcification, otherwise  normal mitral valve. Minimal mitral regurgitation.  Aortic Root: Normal aortic root.  Aortic Valve: Calcified trileaflet aortic valve with normal  opening. Minimal aortic regurgitation.  Left Atrium: Normal left atrium.  LA volume index = 26  cc/m2.  Left Ventricle: Endocardium not well visualized; grossly  normal left ventricular systolic function. Normal left  ventricular internal dimensions and wall thicknesses.  Right Heart: Normal right atrium. The right ventricle is  not well visualized; grossly normal right ventricular  systolic function. Normal tricuspid valve.  Mild tricuspid  regurgitation. Normal pulmonic valve. Minimal pulmonic  regurgitation.  Pericardium/PleuraNormal pericardium with no pericardial  effusion.  ------------------------------------------------------------------------  CONCLUSIONS:  1. Mitral annular calcification, otherwise normal mitral  valve. Minimal mitral regurgitation.  2. Normal left ventricular internal dimensions and wall  thicknesses.  3. Endocardium not well visualized; grossly normal left  ventricular systolic function.  4. The right ventricle is not well visualized; grossly  normal right ventricular systolic function.    Last stress test less than a year in Dr. Mays's office       < from: CT Abdomen and Pelvis No Cont (04.08.20 @ 11:00) >    EXAM:  CT ABDOMEN AND PELVIS        PROCEDURE DATE:  Apr 8 2020         INTERPRETATION:  CLINICAL INFORMATION: bacteremia ADMDIAG1: E87.5 E87.5/ evaluate source of bacteremia    COMPARISON: None.    PROCEDURE:   CT of the Abdomen and Pelvis was performed without intravenous contrast.   Intravenous contrast: None.  Oral contrast: None.  Sagittal and coronal reformats were performed.    FINDINGS:    LOWER CHEST: Small pleural effusions.  Right IJ line. A 2.5 cm calcified right pleural nodule in the lower lobe.    LIVER: Subcentimeter lesions too small to characterize.   BILE DUCTS: Normal caliber.  GALLBLADDER: Within normal limits.  SPLEEN: Within normal limits.   PANCREAS: There is a 16 x 9 cm complex cystic mass likely arising from the pancreatic tail with multiple septations of which are calcified.  ADRENALS: Within normal limits.   KIDNEYS/URETERS: Cysts.    BLADDER: Cevallos catheter in place.   REPRODUCTIVE ORGANS: Adnexal cysts measuring up to 4 cm. Small calcified fibroid.    BOWEL: No bowel obstruction.  PERITONEUM: No ascites.   VESSELS:  Within normal limits.  RETROPERITONEUM/LYMPH NODES: No lymphadenopathy.     ABDOMINAL WALL: Within normal limits.  BONES: Within normal limits.     IMPRESSION: Indeterminate 16 cm left upperquadrant complex cystic mass likely arising from the pancreatic tail. 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    Right lower lobe calcified pleural nodule.        TATE TAPIA M.D., ATTENDING RADIOLOGIST  This document has been electronically signed. Apr 8 2020 11:12AM

## 2020-04-13 NOTE — PROGRESS NOTE ADULT - ASSESSMENT
A 74 y.o F w/ PMH HLD, HTN, insulin dependent T2DM, RA (on chronic prednisone, leflunomide, and hydroxychloroquine) c/b bronchiectasis (on home O2 4 L NC) who p/w NBA on CKD now requiring HD, diastolic heart failure.  Acute renal failure, unspecified acute renal failure type.- s/p NaHCO3 drip, s/p urgent dialysis, now w rick on HD. s/p  bleeding from catheter site. No more bleeding at the site  Anemia- s/p acute blood loss from catheter, s/p  DDAVP, s/p blood transfusion for recurrenet anemia. now Hb of 7.4; likely from ESRD but will complete work up   E coli bacteremia: -ceftriaxone, will repeat BCX to ensure clearance,   Pancreatic mass-Incidental finding of large 16cm tail of Pancreas mass - will need GI evaluation with EUS/FNB when NBA improved and bacteremia resolved  COVID-19 testing negative, CXR clear   RUE swelling compared to other extremeties; will obtain RUE US  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 complication-sliding scale   Hypertension, unspecified type-Monitor BP  Afib: CHADVASC score of 4; will start metoprolol per cards; will check TSH   Prophylactic measure. -On SQ heparin; no signs of active bleeding now; will check iron studies and if no contraindication will start full a/c given afib A 74 y.o F w/ PMH HLD, HTN, insulin dependent T2DM, RA (on chronic prednisone, leflunomide, and hydroxychloroquine) c/b bronchiectasis (on home O2 4 L NC) who p/w NBA on CKD now requiring HD, diastolic heart failure.  Acute renal failure, unspecified acute renal failure type.- s/p NaHCO3 drip, s/p urgent dialysis, now w rick on HD. s/p  bleeding from catheter site. No more bleeding at the site  Anemia- s/p acute blood loss from catheter, s/p  DDAVP, s/p blood transfusion for recurrenet anemia. now Hb of 7.4; likely from ESRD but will complete work up   E coli bacteremia: -ceftriaxone, will repeat BCX to ensure clearance,   Pancreatic mass-Incidental finding of large 16cm tail of Pancreas mass - will need GI evaluation with EUS/FNB when NBA improved and bacteremia resolved  COVID-19 testing negative, CXR clear   RUE swelling compared to other extremeties; will obtain RUE US  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 complication-sliding scale   Hypertension, unspecified type-Monitor BP  RA: Pt on pred 17.5mg for RA? Unknown for how long she has been on this dose, will clarify w rheumatologist.   Afib: CHADVASC score of 4; will start metoprolol per cards; will check TSH   Prophylactic measure. -On SQ heparin; no signs of active bleeding now; will check iron studies and if no contraindication will start full a/c given afib

## 2020-04-13 NOTE — PROGRESS NOTE ADULT - PROBLEM SELECTOR PLAN 1
Pt. initially presented with severe hyperkalemia in the setting of NBA. Pt. noted to have elevated serum potassium of 7.5 on admission (4/4/20). Received medical management but serum potassium remained 6.9. Patient went for urgent HD 4/6/20. Serum potassium on 4/11 was 5.7, went for HD on 4/11/20. Patient non oliguric, will hold off HD and monitor for renal recovery. Low potassium diet. Monitor urine output.  Monitor serum potassium

## 2020-04-13 NOTE — PROGRESS NOTE ADULT - ATTENDING COMMENTS
COVID 19   Hyperkalemia   NBA - sepsis/ hypotension.     Monitor for recovery   no dialysis today. COVID 19   Hyperkalemia   NBA - likely ATN in the setting of infection/ hypotension.     Monitor for recovery   no dialysis today.

## 2020-04-13 NOTE — PROGRESS NOTE ADULT - ASSESSMENT
75 yo F w/ PMH HLD, HTN, insulin dependent T2DM, RA (on chronic prednisone, leflunomide, and hydroxychloroquine) c/b bronchiectasis (on home O2 4 L NC) who p/w worsening LE swelling  Leukocytosis, no fever  E coli bacteremia, unclear source, S to CTX  COVID neg, CXR clear, no fevers--would not retest as we have alternate explanation for inflammatory response  CT A/P reassuring but with pancreatic lesion  Overall,  1) E coli bacteremia  - Ceftriaxone 1g q 24  - F/U BCXs for clearance  - Anticipate would complete treatment course with PO Cipro  2) R/O COVID  - CXR clear, no fevers, COVID PCR neg, lower suspicion for COVID  3) Leukocytosis  - Likely due to bacteremia, trend to normal  - Monitor for alternate cause  4) LE wounds  - Wound care per primary team    Oral Peacock MD  Pager [phone]  After 5pm and on weekends call [phone]

## 2020-04-13 NOTE — PROGRESS NOTE ADULT - SUBJECTIVE AND OBJECTIVE BOX
[facility] DIVISION OF KIDNEY DISEASES AND HYPERTENSION -- FOLLOW UP NOTE  --------------------------------------------------------------------------------  HPI: 75 yo F with RA c/b chronic bronchiectasis, HTN, and DM2 is admitted with SOB. Pt. currently COVID-19 rule out. Nephrology team is consulted for NBA and hyperkalemia. On admission (4/4/20), Scr was elevated to 2.82, which as worsened to 3.31 on 4/5/20. Serum potassium was elevated to 7.5 on admission, and despite multiple rounds of medical management, remained elevated at 6.9, so patient was taken for urgent HD on 4/6/20. Patient developed Aflutter within 10 minutes of HD on 4/10/20 and continued to have bleeding from catheter site so HD treatment was stopped. LAst HD session was on 4/11/20. Patient continues to have good urine output, will           PAST HISTORY  --------------------------------------------------------------------------------  No significant changes to PMH, PSH, FHx, SHx, unless otherwise noted    ALLERGIES & MEDICATIONS  --------------------------------------------------------------------------------  Allergies    No Known Allergies    Intolerances    Standing Inpatient Medications  cefTRIAXone   IVPB 1000 milliGRAM(s) IV Intermittent every 24 hours  dextrose 5%. 1000 milliLiter(s) IV Continuous <Continuous>  dextrose 50% Injectable 12.5 Gram(s) IV Push once  dextrose 50% Injectable 12.5 Gram(s) IV Push once  diphenhydrAMINE   Injectable 25 milliGRAM(s) IV Push once  heparin  Injectable 5000 Unit(s) SubCutaneous every 12 hours  insulin lispro (HumaLOG) corrective regimen sliding scale   SubCutaneous Before meals and at bedtime  predniSONE   Tablet 17.5 milliGRAM(s) Oral daily    REVIEW OF SYSTEMS  --------------------------------------------------------------------------------  Deferred due to COVID-19    All other systems were reviewed and are negative, except as noted.    VITALS/PHYSICAL EXAM  --------------------------------------------------------------------------------  T(C): 36.7 (04-13-20 @ 04:40), Max: 37.4 (04-12-20 @ 13:04)  HR: 95 (04-13-20 @ 04:40) (89 - 95)  BP: 120/79 (04-13-20 @ 04:40) (120/79 - 122/80)  RR: 19 (04-13-20 @ 04:40) (19 - 19)  SpO2: 97% (04-13-20 @ 04:40) (97% - 98%)  Wt(kg): --    04-12-20 @ 07:01  -  04-13-20 @ 07:00  --------------------------------------------------------  IN: 0 mL / OUT: 400 mL / NET: -400 mL    Physical Exam:  	Gen: NAD  	HEENT: supple neck  	Pulm: diminished bibasilar breath sounds  	CV: RRR, S1S2; no rub  	Abd: +BS, soft  	: + Cevallos catheter   	LE: Pitting edema, ? lymphedema b/l  	Skin: Right dorsal foot wound  	Vascular access: LIJ non tunneled HD catheter, no bleeding noted    LABS/STUDIES  --------------------------------------------------------------------------------              6.9    15.37 >-----------<  146      [04-13-20 @ 04:00]              23.6     143  |  101  |  66  ----------------------------<  118      [04-13-20 @ 04:00]  5.2   |  26  |  2.56        Ca     8.6     [04-13-20 @ 04:00]      Mg     2.0     [04-13-20 @ 04:00]      Phos  6.0     [04-13-20 @ 04:00]    CK 44      [04-13-20 @ 04:00]        [04-13-20 @ 04:00]    Creatinine Trend:  SCr 2.56 [04-13 @ 04:00]  SCr 2.36 [04-12 @ 07:20]  SCr 2.81 [04-11 @ 08:00]  SCr 3.13 [04-09 @ 07:10]  SCr 2.94 [04-08 @ 13:02]    Urinalysis - [04-04-20 @ 21:00]      Color YELLOW / Appearance CLEAR / SG 1.019 / pH 6.0      Gluc NEGATIVE / Ketone NEGATIVE  / Bili NEGATIVE / Urobili 0.2       Blood NEGATIVE / Protein MODERATE / Leuk Est SMALL / Nitrite NEGATIVE      RBC NONE / WBC 2-5 / Hyaline  / Gran  / Sq Epi  / Non Sq Epi FEW / Bacteria MODERATE    Urine Creatinine 68.10      [04-08-20 @ 06:00]  Urine Protein 41.9      [04-08-20 @ 06:00]  Urine Sodium < 20      [04-08-20 @ 06:00]  Urine Potassium 39.1      [04-08-20 @ 06:00]  Urine Chloride < 20      [04-08-20 @ 06:00]    Ferritin 815.9      [04-07-20 @ 10:15]  HbA1c 6.4      [04-05-20 @ 06:20]    HBsAg NEGATIVE      [04-05-20 @ 12:00]  HCV 0.21, Nonreactive Hepatitis C AB [facility] DIVISION OF KIDNEY DISEASES AND HYPERTENSION -- FOLLOW UP NOTE  --------------------------------------------------------------------------------  HPI: 73 yo F with RA c/b chronic bronchiectasis, HTN, and DM2 is admitted with SOB. Pt. currently COVID-19 rule out. Nephrology team is consulted for NBA and hyperkalemia. On admission (4/4/20), Scr was elevated to 2.82, which as worsened to 3.31 on 4/5/20. Serum potassium was elevated to 7.5 on admission, and despite multiple rounds of medical management, remained elevated at 6.9, so patient was taken for urgent HD on 4/6/20. Patient developed Aflutter within 10 minutes of HD on 4/10/20 and continued to have bleeding from catheter site so HD treatment was stopped. Lat HD session was on 4/11/20. Patient continues to have good urine output, labs reviewed, will hold off HD and monitor for signs of renal recovery.    PAST HISTORY  --------------------------------------------------------------------------------  No significant changes to PMH, PSH, FHx, SHx, unless otherwise noted    ALLERGIES & MEDICATIONS  --------------------------------------------------------------------------------  Allergies    No Known Allergies    Intolerances    Standing Inpatient Medications  cefTRIAXone   IVPB 1000 milliGRAM(s) IV Intermittent every 24 hours  dextrose 5%. 1000 milliLiter(s) IV Continuous <Continuous>  dextrose 50% Injectable 12.5 Gram(s) IV Push once  dextrose 50% Injectable 12.5 Gram(s) IV Push once  diphenhydrAMINE   Injectable 25 milliGRAM(s) IV Push once  heparin  Injectable 5000 Unit(s) SubCutaneous every 12 hours  insulin lispro (HumaLOG) corrective regimen sliding scale   SubCutaneous Before meals and at bedtime  predniSONE   Tablet 17.5 milliGRAM(s) Oral daily    REVIEW OF SYSTEMS  --------------------------------------------------------------------------------  Deferred due to COVID-19    All other systems were reviewed and are negative, except as noted.    VITALS/PHYSICAL EXAM  --------------------------------------------------------------------------------  T(C): 36.7 (04-13-20 @ 04:40), Max: 37.4 (04-12-20 @ 13:04)  HR: 95 (04-13-20 @ 04:40) (89 - 95)  BP: 120/79 (04-13-20 @ 04:40) (120/79 - 122/80)  RR: 19 (04-13-20 @ 04:40) (19 - 19)  SpO2: 97% (04-13-20 @ 04:40) (97% - 98%)  Wt(kg): --    04-12-20 @ 07:01  -  04-13-20 @ 07:00  --------------------------------------------------------  IN: 0 mL / OUT: 400 mL / NET: -400 mL    Physical Exam:  	Gen: NAD  	HEENT: supple neck  	Pulm: diminished bibasilar breath sounds  	CV: RRR, S1S2; no rub  	Abd: +BS, soft  	: + Cevallos catheter   	LE: Pitting edema, ? lymphedema b/l  	Skin: Right dorsal foot wound  	Vascular access: LIJ non tunneled HD catheter, no bleeding noted    LABS/STUDIES  --------------------------------------------------------------------------------              6.9    15.37 >-----------<  146      [04-13-20 @ 04:00]              23.6     143  |  101  |  66  ----------------------------<  118      [04-13-20 @ 04:00]  5.2   |  26  |  2.56        Ca     8.6     [04-13-20 @ 04:00]      Mg     2.0     [04-13-20 @ 04:00]      Phos  6.0     [04-13-20 @ 04:00]    CK 44      [04-13-20 @ 04:00]        [04-13-20 @ 04:00]    Creatinine Trend:  SCr 2.56 [04-13 @ 04:00]  SCr 2.36 [04-12 @ 07:20]  SCr 2.81 [04-11 @ 08:00]  SCr 3.13 [04-09 @ 07:10]  SCr 2.94 [04-08 @ 13:02]    Urinalysis - [04-04-20 @ 21:00]      Color YELLOW / Appearance CLEAR / SG 1.019 / pH 6.0      Gluc NEGATIVE / Ketone NEGATIVE  / Bili NEGATIVE / Urobili 0.2       Blood NEGATIVE / Protein MODERATE / Leuk Est SMALL / Nitrite NEGATIVE      RBC NONE / WBC 2-5 / Hyaline  / Gran  / Sq Epi  / Non Sq Epi FEW / Bacteria MODERATE    Urine Creatinine 68.10      [04-08-20 @ 06:00]  Urine Protein 41.9      [04-08-20 @ 06:00]  Urine Sodium < 20      [04-08-20 @ 06:00]  Urine Potassium 39.1      [04-08-20 @ 06:00]  Urine Chloride < 20      [04-08-20 @ 06:00]    Ferritin 815.9      [04-07-20 @ 10:15]  HbA1c 6.4      [04-05-20 @ 06:20]    HBsAg NEGATIVE      [04-05-20 @ 12:00]  HCV 0.21, Nonreactive Hepatitis C AB

## 2020-04-13 NOTE — PROGRESS NOTE ADULT - SUBJECTIVE AND OBJECTIVE BOX
DIOR SUTTON  74y  Female      Patient is a 74y old  Female who presents with a chief complaint of LE swelling (13 Apr 2020 13:36)      INTERVAL HPI/OVERNIGHT EVENTS:  Pt w RUE pain. No fevers, chills, cough, rashes.     Medications:  acetaminophen   Tablet .. 650 milliGRAM(s) Oral every 6 hours PRN  cefTRIAXone   IVPB 1000 milliGRAM(s) IV Intermittent every 24 hours  dextrose 40% Gel 15 Gram(s) Oral once PRN  dextrose 40% Gel 15 Gram(s) Oral once PRN  dextrose 5%. 1000 milliLiter(s) IV Continuous <Continuous>  dextrose 50% Injectable 12.5 Gram(s) IV Push once  dextrose 50% Injectable 12.5 Gram(s) IV Push once  glucagon  Injectable 1 milliGRAM(s) IntraMuscular once PRN  heparin  Injectable 5000 Unit(s) SubCutaneous every 12 hours  HYDROmorphone  Injectable 0.5 milliGRAM(s) IV Push every 8 hours PRN  insulin lispro (HumaLOG) corrective regimen sliding scale   SubCutaneous Before meals and at bedtime  midodrine. 10 milliGRAM(s) Oral <User Schedule> PRN  ondansetron Injectable 4 milliGRAM(s) IV Push every 8 hours PRN  predniSONE   Tablet 17.5 milliGRAM(s) Oral daily  simethicone 80 milliGRAM(s) Chew two times a day PRN      REVIEW OF SYSTEMS:  as per HPI otherwise negative    T(C): 36.7 (04-13-20 @ 04:40), Max: 36.7 (04-13-20 @ 04:40)  HR: 95 (04-13-20 @ 04:40) (95 - 95)  BP: 120/79 (04-13-20 @ 04:40) (120/79 - 120/79)  RR: 19 (04-13-20 @ 04:40) (19 - 19)  SpO2: 97% (04-13-20 @ 04:40) (97% - 97%)  Wt(kg): --Vital Signs Last 24 Hrs  T(C): 36.7 (13 Apr 2020 04:40), Max: 36.7 (13 Apr 2020 04:40)  T(F): 98 (13 Apr 2020 04:40), Max: 98 (13 Apr 2020 04:40)  HR: 95 (13 Apr 2020 04:40) (95 - 95)  BP: 120/79 (13 Apr 2020 04:40) (120/79 - 120/79)  BP(mean): --  RR: 19 (13 Apr 2020 04:40) (19 - 19)  SpO2: 97% (13 Apr 2020 04:40) (97% - 97%)    PHYSICAL EXAM:  GENERAL: mild distress  HEAD:  Atraumatic, Normocephalic  EYES: normal sclera  ENMT: no oral lesions  NECK: Supple, No JVD, Normal thyroid  NERVOUS SYSTEM:  Alert & Oriented X3, Good concentration; Motor Strength 5/5 B/L upper and lower extremities; DTRs 2+ intact and symmetric  CHEST/LUNG: Clear to ascultation bilaterally; No rales, rhonchi, wheezing, or rubs  HEART: Regular rate and rhythm; No murmurs, rubs, or gallops  ABDOMEN: Soft, Nontender, Nondistended; Bowel sounds present  SKIN: No rashes or lesions; anasarca, more pronounced on RUE    Consultant(s) Notes Reviewed:  [x ] YES  [ ] NO  Care Discussed with Consultants/Other Providers [ x] YES  [ ] NO    LABS:                        6.9    15.37 )-----------( 146      ( 13 Apr 2020 04:00 )             23.6     04-13    143  |  101  |  66<H>  ----------------------------<  118<H>  5.2   |  26  |  2.56<H>    Ca    8.6      13 Apr 2020 04:00  Phos  6.0     04-13  Mg     2.0     04-13          CAPILLARY BLOOD GLUCOSE      POCT Blood Glucose.: 218 mg/dL (13 Apr 2020 13:44)  POCT Blood Glucose.: 146 mg/dL (13 Apr 2020 09:11)  POCT Blood Glucose.: 138 mg/dL (12 Apr 2020 21:48)  POCT Blood Glucose.: 132 mg/dL (12 Apr 2020 17:18)            RADIOLOGY & ADDITIONAL TESTS:    Imaging Personally Reviewed:  [ ] YES  [ ] NO    HEALTH ISSUES - PROBLEM Dx:  Pancreatic mass: Pancreatic mass  Anemia due to blood loss: Anemia due to blood loss  Hypertension, unspecified type: Hypertension, unspecified type  E coli bacteremia: E coli bacteremia  Rheumatoid arthritis of hip, unspecified laterality, unspecified rheumatoid factor presence: Rheumatoid arthritis of hip, unspecified laterality, unspecified rheumatoid factor presence  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 complication: 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 complication  Acute renal failure, unspecified acute renal failure type: Acute renal failure, unspecified acute renal failure type  Renal failure, unspecified chronicity: Renal failure, unspecified chronicity  Rheumatoid arthritis: Rheumatoid arthritis  SIRS (systemic inflammatory response syndrome): SIRS (systemic inflammatory response syndrome)  Discharge planning issues: Discharge planning issues  Prophylactic measure: Prophylactic measure  Bronchiectasis: Bronchiectasis  Hypertension: Hypertension  Type 2 diabetes mellitus: Type 2 diabetes mellitus  Acute on chronic diastolic (congestive) heart failure: Acute on chronic diastolic (congestive) heart failure  Hyperkalemia: Hyperkalemia  Acute renal failure: Acute renal failure

## 2020-04-13 NOTE — PROGRESS NOTE ADULT - PROBLEM SELECTOR PLAN 2
Pt. with likely NBA in the setting of infection, low BP, and decreased oral intake. Pt. with probable CKD in the setting of long-standing history of DM and HTN. Pt. with normal Scr of 0.96 on 6/7/16, no recent labs. On admission (4/4/20), Scr was elevated to 2.82, which worsened to 3.31 on 4/5/20, today SCr 2.56. Pt. hypoalbuminemic and with protein on UA. Urine electrolytes consistent with pre-renal. Encourage PO intake. Monitor labs and urine output    If any questions, please feel free to contact me  Oliver Ricci   Nephrology Fellow  [phone]  (After 5 pm or on weekends please page the on-call fellow)

## 2020-04-13 NOTE — PROGRESS NOTE ADULT - SUBJECTIVE AND OBJECTIVE BOX
CC: F/U for Bacteremia    Saw/spoke to patient. No new focal complaints. Unchanged.    Allergies  No Known Allergies    ANTIMICROBIALS:  cefTRIAXone   IVPB 1000 every 24 hours    PE:    Vital Signs Last 24 Hrs  T(C): 36.7 (13 Apr 2020 04:40), Max: 37.4 (12 Apr 2020 13:04)  T(F): 98 (13 Apr 2020 04:40), Max: 99.3 (12 Apr 2020 13:04)  HR: 95 (13 Apr 2020 04:40) (89 - 95)  BP: 120/79 (13 Apr 2020 04:40) (120/79 - 122/80)  RR: 19 (13 Apr 2020 04:40) (19 - 19)  SpO2: 97% (13 Apr 2020 04:40) (97% - 98%)    Gen: AOx3, NAD, non-toxic  CV: S1+S2 normal, nontachycardic  Resp: Clear bilat, no resp distress, no crackles/wheezes  Abd: Soft, nontender, +BS  Ext: No LE edema, no wounds    LABS:                        6.9    15.37 )-----------( 146      ( 13 Apr 2020 04:00 )             23.6     04-13    143  |  101  |  66<H>  ----------------------------<  118<H>  5.2   |  26  |  2.56<H>    Ca    8.6      13 Apr 2020 04:00  Phos  6.0     04-13  Mg     2.0     04-13    MICROBIOLOGY:    .Blood Blood-Venous  04-11-20   No growth to date.     .Blood Blood  04-08-20   Growth in aerobic bottle: Escherichia coli  See previous culture [id number]  --    Growth in aerobic bottle: Gram Negative Rods    .Blood Blood-Venous  04-05-20   Growth in aerobic and anaerobic bottles: Escherichia coli  See previous culture [id number]  --    Growth in aerobic bottle: Gram Negative Rods  Growth in anaerobic bottle: Gram Negative Rods    (otherwise reviewed)    RADIOLOGY:    4/8 CXR:    IMPRESSION: Indeterminate 16 cm left upper quadrant complex cystic mass likely arising from the pancreatic tail. 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    Right lower lobe calcified pleural nodule.

## 2020-04-14 LAB
ANION GAP SERPL CALC-SCNC: 15 MMO/L — HIGH (ref 7–14)
ANION GAP SERPL CALC-SCNC: 15 MMO/L — HIGH (ref 7–14)
BUN SERPL-MCNC: 55 MG/DL — HIGH (ref 7–23)
BUN SERPL-MCNC: 84 MG/DL — HIGH (ref 7–23)
CALCIUM SERPL-MCNC: 8.4 MG/DL — SIGNIFICANT CHANGE UP (ref 8.4–10.5)
CALCIUM SERPL-MCNC: 8.9 MG/DL — SIGNIFICANT CHANGE UP (ref 8.4–10.5)
CHLORIDE SERPL-SCNC: 100 MMOL/L — SIGNIFICANT CHANGE UP (ref 98–107)
CHLORIDE SERPL-SCNC: 97 MMOL/L — LOW (ref 98–107)
CO2 SERPL-SCNC: 25 MMOL/L — SIGNIFICANT CHANGE UP (ref 22–31)
CO2 SERPL-SCNC: 25 MMOL/L — SIGNIFICANT CHANGE UP (ref 22–31)
CREAT SERPL-MCNC: 2.41 MG/DL — HIGH (ref 0.5–1.3)
CREAT SERPL-MCNC: 3.05 MG/DL — HIGH (ref 0.5–1.3)
FERRITIN SERPL-MCNC: 1068 NG/ML — HIGH (ref 15–150)
GLUCOSE SERPL-MCNC: 159 MG/DL — HIGH (ref 70–99)
GLUCOSE SERPL-MCNC: 184 MG/DL — HIGH (ref 70–99)
HCT VFR BLD CALC: 25.5 % — LOW (ref 34.5–45)
HCT VFR BLD CALC: 25.9 % — LOW (ref 34.5–45)
HGB BLD-MCNC: 7.3 G/DL — LOW (ref 11.5–15.5)
HGB BLD-MCNC: 7.6 G/DL — LOW (ref 11.5–15.5)
IRON SATN MFR SERPL: 135 UG/DL — LOW (ref 140–530)
IRON SATN MFR SERPL: 24 UG/DL — LOW (ref 30–160)
MAGNESIUM SERPL-MCNC: 2.1 MG/DL — SIGNIFICANT CHANGE UP (ref 1.6–2.6)
MCHC RBC-ENTMCNC: 28.3 PG — SIGNIFICANT CHANGE UP (ref 27–34)
MCHC RBC-ENTMCNC: 28.4 PG — SIGNIFICANT CHANGE UP (ref 27–34)
MCHC RBC-ENTMCNC: 28.6 % — LOW (ref 32–36)
MCHC RBC-ENTMCNC: 29.3 % — LOW (ref 32–36)
MCV RBC AUTO: 96.6 FL — SIGNIFICANT CHANGE UP (ref 80–100)
MCV RBC AUTO: 98.8 FL — SIGNIFICANT CHANGE UP (ref 80–100)
NRBC # FLD: 0.03 K/UL — SIGNIFICANT CHANGE UP (ref 0–0)
NRBC # FLD: 0.04 K/UL — SIGNIFICANT CHANGE UP (ref 0–0)
PHOSPHATE SERPL-MCNC: 7.4 MG/DL — HIGH (ref 2.5–4.5)
PLATELET # BLD AUTO: 152 K/UL — SIGNIFICANT CHANGE UP (ref 150–400)
PLATELET # BLD AUTO: 166 K/UL — SIGNIFICANT CHANGE UP (ref 150–400)
PMV BLD: 11.3 FL — SIGNIFICANT CHANGE UP (ref 7–13)
PMV BLD: 11.8 FL — SIGNIFICANT CHANGE UP (ref 7–13)
POTASSIUM SERPL-MCNC: 5 MMOL/L — SIGNIFICANT CHANGE UP (ref 3.5–5.3)
POTASSIUM SERPL-MCNC: 6.2 MMOL/L — CRITICAL HIGH (ref 3.5–5.3)
POTASSIUM SERPL-SCNC: 5 MMOL/L — SIGNIFICANT CHANGE UP (ref 3.5–5.3)
POTASSIUM SERPL-SCNC: 6.2 MMOL/L — CRITICAL HIGH (ref 3.5–5.3)
RBC # BLD: 2.58 M/UL — LOW (ref 3.8–5.2)
RBC # BLD: 2.68 M/UL — LOW (ref 3.8–5.2)
RBC # FLD: 17.2 % — HIGH (ref 10.3–14.5)
RBC # FLD: 17.3 % — HIGH (ref 10.3–14.5)
RETICS #: 73 K/UL — SIGNIFICANT CHANGE UP (ref 25–125)
RETICS/RBC NFR: 2.8 % — HIGH (ref 0.5–2.5)
SODIUM SERPL-SCNC: 137 MMOL/L — SIGNIFICANT CHANGE UP (ref 135–145)
SODIUM SERPL-SCNC: 140 MMOL/L — SIGNIFICANT CHANGE UP (ref 135–145)
TSH SERPL-MCNC: 2.98 UIU/ML — SIGNIFICANT CHANGE UP (ref 0.27–4.2)
UIBC SERPL-MCNC: 110.6 UG/DL — SIGNIFICANT CHANGE UP (ref 110–370)
WBC # BLD: 16.61 K/UL — HIGH (ref 3.8–10.5)
WBC # BLD: 18.26 K/UL — HIGH (ref 3.8–10.5)
WBC # FLD AUTO: 16.61 K/UL — HIGH (ref 3.8–10.5)
WBC # FLD AUTO: 18.26 K/UL — HIGH (ref 3.8–10.5)

## 2020-04-14 PROCEDURE — 99232 SBSQ HOSP IP/OBS MODERATE 35: CPT | Mod: GC

## 2020-04-14 PROCEDURE — 99233 SBSQ HOSP IP/OBS HIGH 50: CPT

## 2020-04-14 PROCEDURE — 99232 SBSQ HOSP IP/OBS MODERATE 35: CPT

## 2020-04-14 RX ORDER — APIXABAN 2.5 MG/1
5 TABLET, FILM COATED ORAL
Refills: 0 | Status: DISCONTINUED | OUTPATIENT
Start: 2020-04-14 | End: 2020-04-24

## 2020-04-14 RX ORDER — INSULIN HUMAN 100 [IU]/ML
5 INJECTION, SOLUTION SUBCUTANEOUS ONCE
Refills: 0 | Status: DISCONTINUED | OUTPATIENT
Start: 2020-04-14 | End: 2020-04-14

## 2020-04-14 RX ORDER — DEXTROSE 50 % IN WATER 50 %
50 SYRINGE (ML) INTRAVENOUS ONCE
Refills: 0 | Status: COMPLETED | OUTPATIENT
Start: 2020-04-14 | End: 2020-04-14

## 2020-04-14 RX ORDER — INSULIN HUMAN 100 [IU]/ML
5 INJECTION, SOLUTION SUBCUTANEOUS ONCE
Refills: 0 | Status: COMPLETED | OUTPATIENT
Start: 2020-04-14 | End: 2020-04-14

## 2020-04-14 RX ADMIN — Medication 50 MILLILITER(S): at 11:34

## 2020-04-14 RX ADMIN — INSULIN HUMAN 5 UNIT(S): 100 INJECTION, SOLUTION SUBCUTANEOUS at 11:34

## 2020-04-14 RX ADMIN — Medication 1: at 09:06

## 2020-04-14 RX ADMIN — Medication 2: at 18:20

## 2020-04-14 RX ADMIN — CEFTRIAXONE 100 MILLIGRAM(S): 500 INJECTION, POWDER, FOR SOLUTION INTRAMUSCULAR; INTRAVENOUS at 11:35

## 2020-04-14 RX ADMIN — Medication 12.5 MILLIGRAM(S): at 06:20

## 2020-04-14 RX ADMIN — Medication 12.5 MILLIGRAM(S): at 19:50

## 2020-04-14 RX ADMIN — HEPARIN SODIUM 5000 UNIT(S): 5000 INJECTION INTRAVENOUS; SUBCUTANEOUS at 06:20

## 2020-04-14 RX ADMIN — Medication 17.5 MILLIGRAM(S): at 06:20

## 2020-04-14 RX ADMIN — APIXABAN 5 MILLIGRAM(S): 2.5 TABLET, FILM COATED ORAL at 19:50

## 2020-04-14 RX ADMIN — Medication 650 MILLIGRAM(S): at 11:44

## 2020-04-14 NOTE — DIETITIAN INITIAL EVALUATION ADULT. - PROBLEM SELECTOR PLAN 2
P/w k+ of 7.5 in setting of acute renal failure; -s/p lasix 40 IVP x1, R insulin 7 U x1; k+ decreased to 7.2  -EKG sinus but P waves flattened and appear prolonged, s/p calcium gluconate 1 g x1 and 2 g x1  -monitor on telemetry   -S/p bumex 4 IV x1, sodium bicarb 50 IV x1, lokelma 10 g x1; f/u repeat BMP: ADDENDUM; repeat BMP 6.7. Will give another bumex 4 IV x1, lokelma 10 g x1, Rinsulin 10 U x1, calcium gluconate 1 g x1, f/u repeat BMP at noon   -If k+ does not improve will consider urgent HD

## 2020-04-14 NOTE — PROGRESS NOTE ADULT - ASSESSMENT
75 yo F w/ PMH HLD, HTN, insulin dependent T2DM, RA (on chronic prednisone, leflunomide, and hydroxychloroquine) c/b bronchiectasis (on home O2 4 L NC) who p/w worsening LE swelling  Leukocytosis, no fever  E coli bacteremia, unclear source, S to CTX  COVID neg, CXR clear, no fevers--would not retest as we have alternate explanation for inflammatory response  CT A/P reassuring but with pancreatic lesion  Overall,  1) E coli bacteremia  - Ceftriaxone 1g q 24  - F/U BCXs for clearance  - Anticipate would complete treatment course with PO Cipro  2) R/O COVID  - CXR clear, no fevers, COVID PCR neg, lower suspicion for COVID  3) Leukocytosis  - Unclear why persistent, reactive to pancreatic neoplasm?  - Trend to normal  4) LE wounds  - Wound care per primary team  5) Pancreatic lesion  - Care/workup pe primary team    Oral Peacock MD  Pager [phone]  After 5pm and on weekends call [phone]

## 2020-04-14 NOTE — DIETITIAN INITIAL EVALUATION ADULT. - PROBLEM SELECTOR PLAN 4
Meets SIRS criteria (WBC 16.61, HR 93-99, RR 20-28) unclear source   - pro-marlin elevated to 4, CXR clear, UA w/ small leuk esterase  -monitor off abx for now, if spikes fever, low threshold to start broad spectrum abx  -f/u BCX, UCX, covid 19 PCR  -VS q4h

## 2020-04-14 NOTE — DIETITIAN INITIAL EVALUATION ADULT. - PROBLEM SELECTOR PLAN 3
Per Allscript notes, patient w/ diastolic HF   -P/w LE edema, orthopnea likely 2/2 acute on chronic diastolic HF  -S/p lasix 40 IVP x1; bumex 4 IVx1  -monitor I&Os  -f/u TTE

## 2020-04-14 NOTE — DIETITIAN INITIAL EVALUATION ADULT. - PERTINENT MEDS FT
MEDICATIONS  (STANDING):  cefTRIAXone   IVPB 1000 milliGRAM(s) IV Intermittent every 24 hours  dextrose 5%. 1000 milliLiter(s) (50 mL/Hr) IV Continuous <Continuous>  dextrose 50% Injectable 12.5 Gram(s) IV Push once  dextrose 50% Injectable 12.5 Gram(s) IV Push once  dextrose 50% Injectable 50 milliLiter(s) IV Push once  heparin  Injectable 5000 Unit(s) SubCutaneous every 12 hours  insulin lispro (HumaLOG) corrective regimen sliding scale   SubCutaneous Before meals and at bedtime  insulin regular  human recombinant. 5 Unit(s) IV Push once  metoprolol tartrate 12.5 milliGRAM(s) Oral two times a day  predniSONE   Tablet 17.5 milliGRAM(s) Oral daily    MEDICATIONS  (PRN):  acetaminophen   Tablet .. 650 milliGRAM(s) Oral every 6 hours PRN Temp greater or equal to 38C (100.4F), Mild Pain (1 - 3), Moderate Pain (4 - 6)  dextrose 40% Gel 15 Gram(s) Oral once PRN Blood Glucose LESS THAN 70 milliGRAM(s)/deciliter  dextrose 40% Gel 15 Gram(s) Oral once PRN Blood Glucose LESS THAN 70 milliGRAM(s)/deciliter  glucagon  Injectable 1 milliGRAM(s) IntraMuscular once PRN Glucose LESS THAN 70 milligrams/deciliter  HYDROmorphone  Injectable 0.5 milliGRAM(s) IV Push every 8 hours PRN Severe Pain (7 - 10)  midodrine. 10 milliGRAM(s) Oral <User Schedule> PRN Pre-HD for Hypotension  ondansetron Injectable 4 milliGRAM(s) IV Push every 8 hours PRN Nausea and/or Vomiting  simethicone 80 milliGRAM(s) Chew two times a day PRN Upset Stomach

## 2020-04-14 NOTE — PROVIDER CONTACT NOTE (OTHER) - ASSESSMENT
Multiple attempts by numerous nurses and myself to obtain IV Access. Clinical Impact Nurse Unable to Assist.

## 2020-04-14 NOTE — PROGRESS NOTE ADULT - PROBLEM SELECTOR PLAN 1
Pt. initially presented with severe hyperkalemia in the setting of NBA. Pt. noted to have elevated serum potassium of 7.5 on admission (4/4/20). Received medical management but serum potassium remained 6.9. Patient went for urgent HD 4/6/20. Serum potassium today is 6.2 and patient has poor urine output, will arrange for HD today. Low potassium diet. Monitor urine output.  Monitor serum potassium

## 2020-04-14 NOTE — PROGRESS NOTE ADULT - ATTENDING COMMENTS
Acute kidney injury - review of chart done> unsure etiology of NBA but likely atn in the setting of hypotension along with possible volume overload.   Not urinating very much at this time and she is hyperkalemic and volume overloaded.   will dialyze today.   she likely had some CKD even prior to coming in ( no labs since 2016)  urinalysis with moderate protein but no blood- likely chronic.   in the next few days, will need to assess suitability of long term dialysis.

## 2020-04-14 NOTE — DIETITIAN INITIAL EVALUATION ADULT. - PROBLEM SELECTOR PLAN 1
P/w acute renal failure creatinine 3.15 (baseline 1.02 from 3/25) c/b hyperkalemia and oliguria of unclear etiology   -Nephrology consulted; appreciate recs  -f/u repeat BMP; if k+ doesn't decrease or becomes anuric, will likely need urgent HD   -Strict I&Os  -avoid nephrotoxic agents  -f/u renal U/S  -f/u U Na, Ucreatinine, Uprotein/creatinine, C3, C4, hepBsAg, hepatitis C antibody

## 2020-04-14 NOTE — PROGRESS NOTE ADULT - ASSESSMENT
A 74 y.o F w/ PMH HLD, HTN, insulin dependent T2DM, RA (on chronic prednisone, leflunomide, and hydroxychloroquine) c/b bronchiectasis (on home O2 4 L NC) who p/w NBA on CKD now requiring HD, diastolic heart failure.  Acute renal failure, unspecified acute renal failure type.- s/p NaHCO3 drip, s/p urgent dialysis, now w rick on HD. s/p  bleeding from catheter site. No more bleeding at the site  Anemia- s/p acute blood loss from catheter initially however now likely 2/2 to ACD and ESRD, s/p  DDAVP, s/p blood transfusion for recurrent anemia. Iron studies not consistent w iron deficiency anemia.   E coli bacteremia: -ceftriaxone, repeat blood cx on 4/12 cleared  Pancreatic mass-Incidental finding of large 16cm tail of Pancreas mass - will need GI evaluation with EUS/FNB, will need GI eval when stable   COVID-19 testing negative, CXR clear   Anasarca r/o DVT on b/l LE and RUE (swollen more than rest of exremeties   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 complication-sliding scale   Hypertension, unspecified type-Monitor BP  RA: Pt on pred 17.5mg for RA? Unknown for how long she has been on this dose, will clarify w rheumatologist.   Afib: CHADVASC score of 4; TSH wnl and TTE w no gross abnormalities; rate control w metoprolol and a/c give no signs of bleeding.   Prophylactic measure. -on full a/c w eliquis

## 2020-04-14 NOTE — DIETITIAN INITIAL EVALUATION ADULT. - PROBLEM SELECTOR PLAN 6
PMH HTN; home medications include hydralazine 50 TID, amlodipine 10 daily   -hold anti hypertensives in the setting of possible sepsis   -VS q4h

## 2020-04-14 NOTE — CHART NOTE - NSCHARTNOTEFT_GEN_A_CORE
Patient's Potassium 6.2. Nephrology called, dialysis is planned for today, recommended to treat hyperkalemia with insulin 5 units IV + 1 amp of 50% Dextrose.

## 2020-04-14 NOTE — PROGRESS NOTE ADULT - SUBJECTIVE AND OBJECTIVE BOX
Cardiology Attending Progress Note    Otherwise hemodynamically stable  Receiving IV ceftriaxone for E.coli bacteremia    Vital Signs Last 24 Hrs  T(C): 36.8 (14 Apr 2020 06:16), Max: 37.3 (13 Apr 2020 14:20)  T(F): 98.2 (14 Apr 2020 06:16), Max: 99.1 (13 Apr 2020 14:20)  HR: 100 (14 Apr 2020 06:16) (98 - 105)  BP: 127/56 (14 Apr 2020 06:16) (113/60 - 127/56)  BP(mean): --  RR: 17 (14 Apr 2020 06:16) (16 - 17)  SpO2: 100% (14 Apr 2020 06:16) (96% - 100%)    I&O's Detail  None recorded    Appearance: Normal	  Cardiovascular: Irreg irreg S1 S2, No JVD, No murmurs, + edema  Respiratory: Decreased breath sounds bilaterally  Psychiatry: Awake, alert    MEDICATIONS  (STANDING):  cefTRIAXone   IVPB 1000 milliGRAM(s) IV Intermittent every 24 hours  dextrose 5%. 1000 milliLiter(s) (50 mL/Hr) IV Continuous <Continuous>  dextrose 50% Injectable 12.5 Gram(s) IV Push once  dextrose 50% Injectable 12.5 Gram(s) IV Push once  heparin  Injectable 5000 Unit(s) SubCutaneous every 12 hours  insulin lispro (HumaLOG) corrective regimen sliding scale   SubCutaneous Before meals and at bedtime  metoprolol tartrate 12.5 milliGRAM(s) Oral two times a day  predniSONE   Tablet 17.5 milliGRAM(s) Oral daily    MEDICATIONS  (PRN):  acetaminophen   Tablet .. 650 milliGRAM(s) Oral every 6 hours PRN Temp greater or equal to 38C (100.4F), Mild Pain (1 - 3), Moderate Pain (4 - 6)  dextrose 40% Gel 15 Gram(s) Oral once PRN Blood Glucose LESS THAN 70 milliGRAM(s)/deciliter  dextrose 40% Gel 15 Gram(s) Oral once PRN Blood Glucose LESS THAN 70 milliGRAM(s)/deciliter  glucagon  Injectable 1 milliGRAM(s) IntraMuscular once PRN Glucose LESS THAN 70 milligrams/deciliter  HYDROmorphone  Injectable 0.5 milliGRAM(s) IV Push every 8 hours PRN Severe Pain (7 - 10)  midodrine. 10 milliGRAM(s) Oral <User Schedule> PRN Pre-HD for Hypotension  ondansetron Injectable 4 milliGRAM(s) IV Push every 8 hours PRN Nausea and/or Vomiting  simethicone 80 milliGRAM(s) Chew two times a day PRN Upset Stomach    LABS:	 	                                            7.6    18.26 )-----------( 166      ( 14 Apr 2020 06:00 )             25.9   04-14    140  |  100  |  84<H>  ----------------------------<  184<H>  6.2<HH>   |  25  |  3.05<H>    Ca    8.4      14 Apr 2020 06:00  Phos  7.4     04-14  Mg     2.1     04-14      PREVIOUS DIAGNOSTIC TESTING:      DIMENSIONS:  Dimensions:     Normal Values:  LA:     3.3 cm    2.0 - 4.0 cm  Ao:     2.8 cm    2.0 - 3.8 cm  SEPTUM: 0.8 cm    0.6- 1.2 cm  PWT:    0.9 cm    0.6 - 1.1 cm  LVIDd:  4.4 cm    3.0 - 5.6 cm  LVIDs:  3.0 cm    1.8 - 4.0 cm  Derived Variables:  LVMI: 58 g/m2  RWT: 0.40  Fractional short: 32 %  Ejection Fraction (Teicholtz): 60 %  ------------------------------------------------------------------------  OBSERVATIONS:  Mitral Valve: Mitral annular calcification, otherwise  normal mitral valve. Minimal mitral regurgitation.  Aortic Root: Normal aortic root.  Aortic Valve: Calcified trileaflet aortic valve with normal  opening. Minimal aortic regurgitation.  Left Atrium: Normal left atrium.  LA volume index = 26  cc/m2.  Left Ventricle: Endocardium not well visualized; grossly  normal left ventricular systolic function. Normal left  ventricular internal dimensions and wall thicknesses.  Right Heart: Normal right atrium. The right ventricle is  not well visualized; grossly normal right ventricular  systolic function. Normal tricuspid valve.  Mild tricuspid  regurgitation. Normal pulmonic valve. Minimal pulmonic  regurgitation.  Pericardium/PleuraNormal pericardium with no pericardial  effusion.  ------------------------------------------------------------------------  CONCLUSIONS:  1. Mitral annular calcification, otherwise normal mitral  valve. Minimal mitral regurgitation.  2. Normal left ventricular internal dimensions and wall  thicknesses.  3. Endocardium not well visualized; grossly normal left  ventricular systolic function.  4. The right ventricle is not well visualized; grossly  normal right ventricular systolic function.    Last stress test less than a year in Dr. Mays's office       < from: CT Abdomen and Pelvis No Cont (04.08.20 @ 11:00) >    EXAM:  CT ABDOMEN AND PELVIS        PROCEDURE DATE:  Apr 8 2020         INTERPRETATION:  CLINICAL INFORMATION: bacteremia ADMDIAG1: E87.5 E87.5/ evaluate source of bacteremia    COMPARISON: None.    PROCEDURE:   CT of the Abdomen and Pelvis was performed without intravenous contrast.   Intravenous contrast: None.  Oral contrast: None.  Sagittal and coronal reformats were performed.    FINDINGS:    LOWER CHEST: Small pleural effusions.  Right IJ line. A 2.5 cm calcified right pleural nodule in the lower lobe.    LIVER: Subcentimeter lesions too small to characterize.   BILE DUCTS: Normal caliber.  GALLBLADDER: Within normal limits.  SPLEEN: Within normal limits.   PANCREAS: There is a 16 x 9 cm complex cystic mass likely arising from the pancreatic tail with multiple septations of which are calcified.  ADRENALS: Within normal limits.   KIDNEYS/URETERS: Cysts.    BLADDER: Cevallos catheter in place.   REPRODUCTIVE ORGANS: Adnexal cysts measuring up to 4 cm. Small calcified fibroid.    BOWEL: No bowel obstruction.  PERITONEUM: No ascites.   VESSELS:  Within normal limits.  RETROPERITONEUM/LYMPH NODES: No lymphadenopathy.     ABDOMINAL WALL: Within normal limits.  BONES: Within normal limits.     IMPRESSION: Indeterminate 16 cm left upperquadrant complex cystic mass likely arising from the pancreatic tail. 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    Right lower lobe calcified pleural nodule.        TATE TAPIA M.D., ATTENDING RADIOLOGIST  This document has been electronically signed. Apr 8 2020 11:12AM Cardiology Attending Progress Note    Otherwise hemodynamically stable  Receiving IV ceftriaxone for E.coli bacteremia  Nephrology consult pending    Vital Signs Last 24 Hrs  T(C): 36.8 (14 Apr 2020 06:16), Max: 37.3 (13 Apr 2020 14:20)  T(F): 98.2 (14 Apr 2020 06:16), Max: 99.1 (13 Apr 2020 14:20)  HR: 100 (14 Apr 2020 06:16) (98 - 105)  BP: 127/56 (14 Apr 2020 06:16) (113/60 - 127/56)  BP(mean): --  RR: 17 (14 Apr 2020 06:16) (16 - 17)  SpO2: 100% (14 Apr 2020 06:16) (96% - 100%)    I&O's Detail  None recorded    Appearance: Normal	  Cardiovascular: Irreg irreg S1 S2, No JVD, No murmurs, + edema  Respiratory: Decreased breath sounds bilaterally  Psychiatry: Awake, alert    MEDICATIONS  (STANDING):  cefTRIAXone   IVPB 1000 milliGRAM(s) IV Intermittent every 24 hours  dextrose 5%. 1000 milliLiter(s) (50 mL/Hr) IV Continuous <Continuous>  dextrose 50% Injectable 12.5 Gram(s) IV Push once  dextrose 50% Injectable 12.5 Gram(s) IV Push once  heparin  Injectable 5000 Unit(s) SubCutaneous every 12 hours  insulin lispro (HumaLOG) corrective regimen sliding scale   SubCutaneous Before meals and at bedtime  metoprolol tartrate 12.5 milliGRAM(s) Oral two times a day  predniSONE   Tablet 17.5 milliGRAM(s) Oral daily    MEDICATIONS  (PRN):  acetaminophen   Tablet .. 650 milliGRAM(s) Oral every 6 hours PRN Temp greater or equal to 38C (100.4F), Mild Pain (1 - 3), Moderate Pain (4 - 6)  dextrose 40% Gel 15 Gram(s) Oral once PRN Blood Glucose LESS THAN 70 milliGRAM(s)/deciliter  dextrose 40% Gel 15 Gram(s) Oral once PRN Blood Glucose LESS THAN 70 milliGRAM(s)/deciliter  glucagon  Injectable 1 milliGRAM(s) IntraMuscular once PRN Glucose LESS THAN 70 milligrams/deciliter  HYDROmorphone  Injectable 0.5 milliGRAM(s) IV Push every 8 hours PRN Severe Pain (7 - 10)  midodrine. 10 milliGRAM(s) Oral <User Schedule> PRN Pre-HD for Hypotension  ondansetron Injectable 4 milliGRAM(s) IV Push every 8 hours PRN Nausea and/or Vomiting  simethicone 80 milliGRAM(s) Chew two times a day PRN Upset Stomach    LABS:	 	                                            7.6    18.26 )-----------( 166      ( 14 Apr 2020 06:00 )             25.9   04-14    140  |  100  |  84<H>  ----------------------------<  184<H>  6.2<HH>   |  25  |  3.05<H>    Ca    8.4      14 Apr 2020 06:00  Phos  7.4     04-14  Mg     2.1     04-14      PREVIOUS DIAGNOSTIC TESTING:      DIMENSIONS:  Dimensions:     Normal Values:  LA:     3.3 cm    2.0 - 4.0 cm  Ao:     2.8 cm    2.0 - 3.8 cm  SEPTUM: 0.8 cm    0.6- 1.2 cm  PWT:    0.9 cm    0.6 - 1.1 cm  LVIDd:  4.4 cm    3.0 - 5.6 cm  LVIDs:  3.0 cm    1.8 - 4.0 cm  Derived Variables:  LVMI: 58 g/m2  RWT: 0.40  Fractional short: 32 %  Ejection Fraction (Teicholtz): 60 %  ------------------------------------------------------------------------  OBSERVATIONS:  Mitral Valve: Mitral annular calcification, otherwise  normal mitral valve. Minimal mitral regurgitation.  Aortic Root: Normal aortic root.  Aortic Valve: Calcified trileaflet aortic valve with normal  opening. Minimal aortic regurgitation.  Left Atrium: Normal left atrium.  LA volume index = 26  cc/m2.  Left Ventricle: Endocardium not well visualized; grossly  normal left ventricular systolic function. Normal left  ventricular internal dimensions and wall thicknesses.  Right Heart: Normal right atrium. The right ventricle is  not well visualized; grossly normal right ventricular  systolic function. Normal tricuspid valve.  Mild tricuspid  regurgitation. Normal pulmonic valve. Minimal pulmonic  regurgitation.  Pericardium/PleuraNormal pericardium with no pericardial  effusion.  ------------------------------------------------------------------------  CONCLUSIONS:  1. Mitral annular calcification, otherwise normal mitral  valve. Minimal mitral regurgitation.  2. Normal left ventricular internal dimensions and wall  thicknesses.  3. Endocardium not well visualized; grossly normal left  ventricular systolic function.  4. The right ventricle is not well visualized; grossly  normal right ventricular systolic function.    Last stress test less than a year in Dr. Mays's office       < from: CT Abdomen and Pelvis No Cont (04.08.20 @ 11:00) >    EXAM:  CT ABDOMEN AND PELVIS        PROCEDURE DATE:  Apr 8 2020         INTERPRETATION:  CLINICAL INFORMATION: bacteremia ADMDIAG1: E87.5 E87.5/ evaluate source of bacteremia    COMPARISON: None.    PROCEDURE:   CT of the Abdomen and Pelvis was performed without intravenous contrast.   Intravenous contrast: None.  Oral contrast: None.  Sagittal and coronal reformats were performed.    FINDINGS:    LOWER CHEST: Small pleural effusions.  Right IJ line. A 2.5 cm calcified right pleural nodule in the lower lobe.    LIVER: Subcentimeter lesions too small to characterize.   BILE DUCTS: Normal caliber.  GALLBLADDER: Within normal limits.  SPLEEN: Within normal limits.   PANCREAS: There is a 16 x 9 cm complex cystic mass likely arising from the pancreatic tail with multiple septations of which are calcified.  ADRENALS: Within normal limits.   KIDNEYS/URETERS: Cysts.    BLADDER: Cevallos catheter in place.   REPRODUCTIVE ORGANS: Adnexal cysts measuring up to 4 cm. Small calcified fibroid.    BOWEL: No bowel obstruction.  PERITONEUM: No ascites.   VESSELS:  Within normal limits.  RETROPERITONEUM/LYMPH NODES: No lymphadenopathy.     ABDOMINAL WALL: Within normal limits.  BONES: Within normal limits.     IMPRESSION: Indeterminate 16 cm left upperquadrant complex cystic mass likely arising from the pancreatic tail. 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    Right lower lobe calcified pleural nodule.        TATE TAPIA M.D., ATTENDING RADIOLOGIST  This document has been electronically signed. Apr 8 2020 11:12AM

## 2020-04-14 NOTE — DIETITIAN INITIAL EVALUATION ADULT. - PERTINENT LABORATORY DATA
04-14 Na 140 mmol/L Glu 184 mg/dL<H> K+ 6.2 mmol/L<HH> Cr 3.05 mg/dL<H> BUN 84 mg/dL<H> Phos 7.4 mg/dL<H>  04-05-20 HbA1c 6.4 %  04-14 @ 08:46 POCT 189 mg/dL  04-13 @ 23:18 POCT 195 mg/dL  04-13 @ 21:51 POCT 219 mg/dL  04-13 @ 17:49 POCT 225 mg/dL  04-13 @ 13:44 POCT 218 mg/dL

## 2020-04-14 NOTE — PROVIDER CONTACT NOTE (CRITICAL VALUE NOTIFICATION) - TEST AND RESULT REPORTED:
K 6.2.
H&H 6.0/20.8
H&H 6.9/23.6
K 6.9
Potassium 6.7
Potassium 7.2, specimen not hemolyzed   BUN: 106  Creatinine: 3.0
hb of 7  / hct 23.3

## 2020-04-14 NOTE — PROGRESS NOTE ADULT - SUBJECTIVE AND OBJECTIVE BOX
[facility] DIVISION OF KIDNEY DISEASES AND HYPERTENSION -- FOLLOW UP NOTE  --------------------------------------------------------------------------------  HPI: 73 yo F with RA c/b chronic bronchiectasis, HTN, and DM2 is admitted with SOB. Pt. currently COVID-19 rule out. Nephrology team is consulted for NBA and hyperkalemia. On admission (4/4/20), Scr was elevated to 2.82, which as worsened to 3.31 on 4/5/20. Serum potassium was elevated to 7.5 on admission, and despite multiple rounds of medical management, remained elevated at 6.9, so patient was taken for urgent HD on 4/6/20. Patient developed Aflutter within 10 minutes of HD on 4/10/20 and continued to have bleeding from catheter site so HD treatment was stopped. Last HD session was on 4/11/20.     Patient evaluated at bedside, in no respiratory distress. Scr today has increased to 3.05 and patient with poor urine output. Will arrange for HD today.    PAST HISTORY  --------------------------------------------------------------------------------  No significant changes to PMH, PSH, FHx, SHx, unless otherwise noted    ALLERGIES & MEDICATIONS  --------------------------------------------------------------------------------  Allergies    No Known Allergies    Intolerances    Standing Inpatient Medications  cefTRIAXone   IVPB 1000 milliGRAM(s) IV Intermittent every 24 hours  dextrose 5%. 1000 milliLiter(s) IV Continuous <Continuous>  dextrose 50% Injectable 12.5 Gram(s) IV Push once  dextrose 50% Injectable 12.5 Gram(s) IV Push once  dextrose 50% Injectable 50 milliLiter(s) IV Push once  heparin  Injectable 5000 Unit(s) SubCutaneous every 12 hours  insulin lispro (HumaLOG) corrective regimen sliding scale   SubCutaneous Before meals and at bedtime  insulin regular  human recombinant. 5 Unit(s) IV Push once  metoprolol tartrate 12.5 milliGRAM(s) Oral two times a day  predniSONE   Tablet 17.5 milliGRAM(s) Oral daily    REVIEW OF SYSTEMS  --------------------------------------------------------------------------------  Gen: + lethargy  Respiratory: mild dyspnea  CV: No chest pain  GI: No abdominal pain  MSK: + LE edema    All other systems were reviewed and are negative, except as noted.    VITALS/PHYSICAL EXAM  --------------------------------------------------------------------------------  T(C): 36.8 (04-14-20 @ 06:16), Max: 37.3 (04-13-20 @ 14:20)  HR: 100 (04-14-20 @ 06:16) (98 - 105)  BP: 127/56 (04-14-20 @ 06:16) (113/60 - 127/56)  RR: 17 (04-14-20 @ 06:16) (16 - 17)  SpO2: 100% (04-14-20 @ 06:16) (96% - 100%)  Wt(kg): --    Physical Exam:  	Gen: NAD  	HEENT: supple neck  	Pulm: diminished bibasilar breath sounds  	CV: RRR, S1S2; no rub  	Abd: +BS, soft  	: + Cevallos catheter   	LE: Pitting edema, ? lymphedema b/l  	Skin: Right dorsal foot wound  	Vascular access: LIJ non tunneled HD catheter, no bleeding noted    LABS/STUDIES  --------------------------------------------------------------------------------              7.6    18.26 >-----------<  166      [04-14-20 @ 06:00]              25.9     140  |  100  |  84  ----------------------------<  184      [04-14-20 @ 06:00]  6.2   |  25  |  3.05        Ca     8.4     [04-14-20 @ 06:00]      Mg     2.1     [04-14-20 @ 06:00]      Phos  7.4     [04-14-20 @ 06:00]    CK 44      [04-13-20 @ 04:00]        [04-13-20 @ 04:00]    Creatinine Trend:  SCr 3.05 [04-14 @ 06:00]  SCr 2.56 [04-13 @ 04:00]  SCr 2.36 [04-12 @ 07:20]  SCr 2.81 [04-11 @ 08:00]  SCr 3.13 [04-09 @ 07:10]

## 2020-04-14 NOTE — PROVIDER CONTACT NOTE (OTHER) - BACKGROUND
Pt. admitted for hyperkalemia; with HX of A-fib and Internal Jugular cath for access. Patient has a pancreatic mass w/ EUS f/u

## 2020-04-14 NOTE — DIETITIAN INITIAL EVALUATION ADULT. - PROBLEM SELECTOR PLAN 5
PMH insulin dependent T2DM; home medications include tresiba 40 U at bedtime, Novolog sliding scale, amaryl 2 mg BID  -c/w lantus 15 U (about 50% given acute renal failure)   -FS q6h while NPO  -Hyperglycemic to 400s given recent D50 adminsitration and renal failure  -f/u A1c in AM

## 2020-04-14 NOTE — PROGRESS NOTE ADULT - SUBJECTIVE AND OBJECTIVE BOX
CC: F/U bacteremia    Saw/spoke to patient. No fevers, no chills. No new complaints. Unchanged.    Allergies  No Known Allergies    ANTIMICROBIALS:  cefTRIAXone   IVPB 1000 every 24 hours    PE:    Vital Signs Last 24 Hrs  T(C): 36.9 (14 Apr 2020 11:53), Max: 36.9 (13 Apr 2020 21:08)  T(F): 98.5 (14 Apr 2020 11:53), Max: 98.5 (13 Apr 2020 23:00)  HR: 102 (14 Apr 2020 11:53) (98 - 102)  BP: 138/61 (14 Apr 2020 11:53) (113/60 - 138/61)  RR: 20 (14 Apr 2020 11:53) (17 - 20)  SpO2: 100% (14 Apr 2020 11:53) (100% - 100%)    Gen: AOx3, NAD, non-toxic  CV: S1+S2 normal, tachycardic  Resp: Clear bilat, no resp distress, no crackles/wheezes  Abd: Soft, nontender, +BS  Ext: No LE edema, no wounds  Lines: L sided HD cath, IJ    LABS:                        7.6    18.26 )-----------( 166      ( 14 Apr 2020 06:00 )             25.9     04-14    140  |  100  |  84<H>  ----------------------------<  184<H>  6.2<HH>   |  25  |  3.05<H>    Ca    8.4      14 Apr 2020 06:00  Phos  7.4     04-14  Mg     2.1     04-14    MICROBIOLOGY:    .Blood Blood-Peripheral aerobic  04-12-20   No growth to date.     .Blood Blood-Venous  04-11-20   No growth to date.     .Blood Blood  04-08-20   Growth in aerobic bottle: Escherichia coli  See previous culture [id number]  --    Growth in aerobic bottle: Gram Negative Rods    (otherwise reviewed)    RADIOLOGY:    4/8 CT:    IMPRESSION: Indeterminate 16 cm left upper quadrant complex cystic mass likely arising from the pancreatic tail. 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    Right lower lobe calcified pleural nodule.

## 2020-04-14 NOTE — PROGRESS NOTE ADULT - PROBLEM SELECTOR PLAN 2
Pt. with likely NBA in the setting of infection, low BP, and decreased oral intake. Pt. with probable CKD in the setting of long-standing history of DM and HTN. Pt. with normal Scr of 0.96 on 6/7/16, no recent labs. On admission (4/4/20), Scr was elevated to 2.82, which worsened to 3.05 today and patient oliguric. Will arrange for HD today.    If any questions, please feel free to contact me  Oliver Ricci   Nephrology Fellow  [phone]  (After 5 pm or on weekends please page the on-call fellow)

## 2020-04-14 NOTE — DIETITIAN INITIAL EVALUATION ADULT. - OTHER INFO
74-year-old female patient with PMH of HLD, HTN, type 2 diabetes mellitus, and RA complicated by bronchiectasis. Presented to hospital with NBA complicated by hyperkalemia and acute on chronic diastolic heart failure.     Unable to conduct a face to face interview or nutrition-focused physical exam due to limited contact restrictions related to patient's medical condition and isolation precautions. Obtained subjective information from extensive chart review. Patient with current weight of 107 kg (4/9) with previous weight of 111.6 kg (4/5). 4.6 kg weight loss is suspected to be fluid related. Suggest obtaining new weight to monitor trend. Patient to continue with consistent carbohydrate, renal diet given hyperglycemia and elevated K+, Cr, and BUN.     RD services to remain available.

## 2020-04-14 NOTE — PROGRESS NOTE ADULT - SUBJECTIVE AND OBJECTIVE BOX
DIOR SUTTON  74y  Female      Patient is a 74y old  Female who presents with a chief complaint of LE swelling (14 Apr 2020 10:37)      INTERVAL HPI/OVERNIGHT EVENTS:  Pt w h/a. No fevers, chills, SOB.     Medications:  acetaminophen   Tablet .. 650 milliGRAM(s) Oral every 6 hours PRN  apixaban 5 milliGRAM(s) Oral two times a day  cefTRIAXone   IVPB 1000 milliGRAM(s) IV Intermittent every 24 hours  dextrose 40% Gel 15 Gram(s) Oral once PRN  dextrose 40% Gel 15 Gram(s) Oral once PRN  dextrose 5%. 1000 milliLiter(s) IV Continuous <Continuous>  dextrose 50% Injectable 12.5 Gram(s) IV Push once  dextrose 50% Injectable 12.5 Gram(s) IV Push once  glucagon  Injectable 1 milliGRAM(s) IntraMuscular once PRN  HYDROmorphone  Injectable 0.5 milliGRAM(s) IV Push every 8 hours PRN  insulin lispro (HumaLOG) corrective regimen sliding scale   SubCutaneous Before meals and at bedtime  metoprolol tartrate 12.5 milliGRAM(s) Oral two times a day  midodrine. 10 milliGRAM(s) Oral <User Schedule> PRN  ondansetron Injectable 4 milliGRAM(s) IV Push every 8 hours PRN  predniSONE   Tablet 17.5 milliGRAM(s) Oral daily  simethicone 80 milliGRAM(s) Chew two times a day PRN      REVIEW OF SYSTEMS:  as per HPI, otherwise negative     T(C): 36.9 (04-14-20 @ 11:53), Max: 36.9 (04-13-20 @ 21:08)  HR: 102 (04-14-20 @ 11:53) (98 - 102)  BP: 138/61 (04-14-20 @ 11:53) (113/60 - 138/61)  RR: 20 (04-14-20 @ 11:53) (17 - 20)  SpO2: 100% (04-14-20 @ 11:53) (100% - 100%)  Wt(kg): --Vital Signs Last 24 Hrs  T(C): 36.9 (14 Apr 2020 11:53), Max: 36.9 (13 Apr 2020 21:08)  T(F): 98.5 (14 Apr 2020 11:53), Max: 98.5 (13 Apr 2020 23:00)  HR: 102 (14 Apr 2020 11:53) (98 - 102)  BP: 138/61 (14 Apr 2020 11:53) (113/60 - 138/61)  BP(mean): --  RR: 20 (14 Apr 2020 11:53) (17 - 20)  SpO2: 100% (14 Apr 2020 11:53) (100% - 100%)    PHYSICAL EXAM:  GENERAL: mild distress, obese  HEAD:  Atraumatic, Normocephalic  EYES: normal sclera  ENMT: no oral lesions  NECK: Supple, No JVD, Normal thyroid  NERVOUS SYSTEM:  Alert & Oriented X3, Good concentration;  CHEST/LUNG: Clear to ascultation bilaterally; No rales, rhonchi, wheezing, or rubs  HEART: Regular rate and rhythm; No murmurs, rubs, or gallops  ABDOMEN: Soft, Nontender, Nondistended; Bowel sounds present  SKIN: No rashes or lesions; anasarca, more pronounced on RUE    Consultant(s) Notes Reviewed:  [x ] YES  [ ] NO  Care Discussed with Consultants/Other Providers [ x] YES  [ ] NO    LABS:                        7.6    18.26 )-----------( 166      ( 14 Apr 2020 06:00 )             25.9     04-14    140  |  100  |  84<H>  ----------------------------<  184<H>  6.2<HH>   |  25  |  3.05<H>    Ca    8.4      14 Apr 2020 06:00  Phos  7.4     04-14  Mg     2.1     04-14          CAPILLARY BLOOD GLUCOSE      POCT Blood Glucose.: 175 mg/dL (14 Apr 2020 14:01)  POCT Blood Glucose.: 202 mg/dL (14 Apr 2020 12:56)  POCT Blood Glucose.: 319 mg/dL (14 Apr 2020 11:53)  POCT Blood Glucose.: 218 mg/dL (14 Apr 2020 11:30)  POCT Blood Glucose.: 189 mg/dL (14 Apr 2020 08:46)  POCT Blood Glucose.: 195 mg/dL (13 Apr 2020 23:18)  POCT Blood Glucose.: 219 mg/dL (13 Apr 2020 21:51)  POCT Blood Glucose.: 225 mg/dL (13 Apr 2020 17:49)            RADIOLOGY & ADDITIONAL TESTS:    Imaging Personally Reviewed:  [ ] YES  [ ] NO    HEALTH ISSUES - PROBLEM Dx:  Pancreatic mass: Pancreatic mass  Anemia due to blood loss: Anemia due to blood loss  Hypertension, unspecified type: Hypertension, unspecified type  E coli bacteremia: E coli bacteremia  Rheumatoid arthritis of hip, unspecified laterality, unspecified rheumatoid factor presence: Rheumatoid arthritis of hip, unspecified laterality, unspecified rheumatoid factor presence  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 complication: 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 complication  Acute renal failure, unspecified acute renal failure type: Acute renal failure, unspecified acute renal failure type  Renal failure, unspecified chronicity: Renal failure, unspecified chronicity  Rheumatoid arthritis: Rheumatoid arthritis  SIRS (systemic inflammatory response syndrome): SIRS (systemic inflammatory response syndrome)  Discharge planning issues: Discharge planning issues  Prophylactic measure: Prophylactic measure  Bronchiectasis: Bronchiectasis  Hypertension: Hypertension  Type 2 diabetes mellitus: Type 2 diabetes mellitus  Acute on chronic diastolic (congestive) heart failure: Acute on chronic diastolic (congestive) heart failure  Hyperkalemia: Hyperkalemia  Acute renal failure: Acute renal failure

## 2020-04-14 NOTE — DIETITIAN INITIAL EVALUATION ADULT. - PROBLEM SELECTOR PLAN 9
IMPROVE score: HSQ q12h  Diet: NPO except meds given current condition    #GOC  -attempted to have GOC conversation regarding code status but patient was overwhelmed and refused to participate in conversation. She states that she did not want to think about it.   Remains FULL CODE at this time

## 2020-04-14 NOTE — PROGRESS NOTE ADULT - ASSESSMENT
Patient is a 75 yo woma with HLD, HTN, insulin dependent T2DM, RA (on chronic prednisone, leflunomide, and hydroxychloroquine) c/b bronchiectasis (on home O2 4 L NC) who p/w worsening LE swelling and was noted to be in atrial fibrillation.    Elevated troponins, in setting of NBA on HD, type II MI  - In the setting sepsis (E coli bacteremia) and new pancreatic tail cystic lesion  - Low suspicion for ACS, given clinical context and with underlying NBA now on HD with negative CK  - TTE with grossly normal left ventricular systolic function  - Last stress test less than a year in Dr. Mays's office was reportedly unremarkable   - ECG NSR with non ST specific changes   - Aspirin held    Atrial fibrillation  - NAHOMI-VASc score 4   - Rate controlled, not on medications at this time  - s/p blood transfusions. Not on anticoagulants at this time    Will follow.

## 2020-04-15 LAB
ANION GAP SERPL CALC-SCNC: 13 MMO/L — SIGNIFICANT CHANGE UP (ref 7–14)
BUN SERPL-MCNC: 60 MG/DL — HIGH (ref 7–23)
CALCIUM SERPL-MCNC: 8.9 MG/DL — SIGNIFICANT CHANGE UP (ref 8.4–10.5)
CHLORIDE SERPL-SCNC: 100 MMOL/L — SIGNIFICANT CHANGE UP (ref 98–107)
CO2 SERPL-SCNC: 28 MMOL/L — SIGNIFICANT CHANGE UP (ref 22–31)
CREAT SERPL-MCNC: 2.84 MG/DL — HIGH (ref 0.5–1.3)
FOLATE SERPL-MCNC: 8.6 NG/ML — SIGNIFICANT CHANGE UP (ref 4.7–20)
GLUCOSE SERPL-MCNC: 140 MG/DL — HIGH (ref 70–99)
HCT VFR BLD CALC: 25.7 % — LOW (ref 34.5–45)
HGB BLD-MCNC: 7.5 G/DL — LOW (ref 11.5–15.5)
MCHC RBC-ENTMCNC: 28.5 PG — SIGNIFICANT CHANGE UP (ref 27–34)
MCHC RBC-ENTMCNC: 29.2 % — LOW (ref 32–36)
MCV RBC AUTO: 97.7 FL — SIGNIFICANT CHANGE UP (ref 80–100)
NRBC # FLD: 0.06 K/UL — SIGNIFICANT CHANGE UP (ref 0–0)
PLATELET # BLD AUTO: 163 K/UL — SIGNIFICANT CHANGE UP (ref 150–400)
PMV BLD: 11.9 FL — SIGNIFICANT CHANGE UP (ref 7–13)
POTASSIUM SERPL-MCNC: 5.3 MMOL/L — SIGNIFICANT CHANGE UP (ref 3.5–5.3)
POTASSIUM SERPL-SCNC: 5.3 MMOL/L — SIGNIFICANT CHANGE UP (ref 3.5–5.3)
RBC # BLD: 2.63 M/UL — LOW (ref 3.8–5.2)
RBC # FLD: 17.2 % — HIGH (ref 10.3–14.5)
SODIUM SERPL-SCNC: 141 MMOL/L — SIGNIFICANT CHANGE UP (ref 135–145)
VIT B12 SERPL-MCNC: > 2000 PG/ML — HIGH (ref 200–900)
WBC # BLD: 12.97 K/UL — HIGH (ref 3.8–10.5)
WBC # FLD AUTO: 12.97 K/UL — HIGH (ref 3.8–10.5)

## 2020-04-15 PROCEDURE — 99232 SBSQ HOSP IP/OBS MODERATE 35: CPT | Mod: CS

## 2020-04-15 PROCEDURE — 99232 SBSQ HOSP IP/OBS MODERATE 35: CPT

## 2020-04-15 PROCEDURE — 99232 SBSQ HOSP IP/OBS MODERATE 35: CPT | Mod: GC

## 2020-04-15 RX ORDER — APIXABAN 2.5 MG/1
1 TABLET, FILM COATED ORAL
Qty: 60 | Refills: 0
Start: 2020-04-15 | End: 2020-05-14

## 2020-04-15 RX ORDER — BUMETANIDE 0.25 MG/ML
4 INJECTION INTRAMUSCULAR; INTRAVENOUS ONCE
Refills: 0 | Status: COMPLETED | OUTPATIENT
Start: 2020-04-15 | End: 2020-04-15

## 2020-04-15 RX ADMIN — Medication 12.5 MILLIGRAM(S): at 17:57

## 2020-04-15 RX ADMIN — Medication 12.5 MILLIGRAM(S): at 06:35

## 2020-04-15 RX ADMIN — Medication 650 MILLIGRAM(S): at 06:35

## 2020-04-15 RX ADMIN — CEFTRIAXONE 100 MILLIGRAM(S): 500 INJECTION, POWDER, FOR SOLUTION INTRAMUSCULAR; INTRAVENOUS at 11:33

## 2020-04-15 RX ADMIN — BUMETANIDE 132 MILLIGRAM(S): 0.25 INJECTION INTRAMUSCULAR; INTRAVENOUS at 12:10

## 2020-04-15 RX ADMIN — Medication 17.5 MILLIGRAM(S): at 06:37

## 2020-04-15 RX ADMIN — APIXABAN 5 MILLIGRAM(S): 2.5 TABLET, FILM COATED ORAL at 06:38

## 2020-04-15 RX ADMIN — APIXABAN 5 MILLIGRAM(S): 2.5 TABLET, FILM COATED ORAL at 17:54

## 2020-04-15 RX ADMIN — Medication 3: at 17:38

## 2020-04-15 RX ADMIN — Medication 1: at 09:20

## 2020-04-15 RX ADMIN — Medication 1 APPLICATION(S): at 13:10

## 2020-04-15 RX ADMIN — Medication 2: at 13:10

## 2020-04-15 RX ADMIN — Medication 2: at 22:31

## 2020-04-15 RX ADMIN — CEFTRIAXONE 100 MILLIGRAM(S): 500 INJECTION, POWDER, FOR SOLUTION INTRAMUSCULAR; INTRAVENOUS at 17:38

## 2020-04-15 NOTE — PROGRESS NOTE ADULT - SUBJECTIVE AND OBJECTIVE BOX
CC: F/U for Bacteremia    Saw/spoke to patient. No fevers, no chills. No new complaints. Appears fatigued today.    Allergies  No Known Allergies    ANTIMICROBIALS:  cefTRIAXone   IVPB 1000 every 24 hours    PE:    Vital Signs Last 24 Hrs  T(C): 37.1 (15 Apr 2020 06:26), Max: 37.1 (15 Apr 2020 06:26)  T(F): 98.7 (15 Apr 2020 06:26), Max: 98.7 (15 Apr 2020 06:26)  HR: 103 (15 Apr 2020 06:26) (103 - 105)  BP: 115/54 (15 Apr 2020 06:26) (110/54 - 120/54)  RR: 18 (15 Apr 2020 06:26) (18 - 18)  SpO2: 100% (15 Apr 2020 06:26) (100% - 100%)    Gen: AOx3, NAD, non-toxic  CV: S1+S2 normal, nontachycardic  Resp: Clear bilat, no resp distress, no crackles/wheezes  Abd: Soft, nontender, +BS  Ext: No LE edema, no wounds  Lines: L sided [facility]    LABS:                        7.5    12.97 )-----------( 163      ( 15 Apr 2020 06:20 )             25.7     04-15    141  |  100  |  60<H>  ----------------------------<  140<H>  5.3   |  28  |  2.84<H>    Ca    8.9      15 Apr 2020 06:20  Phos  7.4     04-14  Mg     2.1     04-14    MICROBIOLOGY:    .Blood Blood-Peripheral aerobic  04-12-20   No growth to date.    .Blood Blood-Venous  04-11-20   No growth to date.    .Blood Blood  04-08-20   Growth in aerobic bottle: Escherichia coli  See previous culture [id number]  --    Growth in aerobic bottle: Gram Negative Rods    .Blood Blood-Venous  04-05-20   Growth in aerobic and anaerobic bottles: Escherichia coli  See previous culture [id number]  --    Growth in aerobic bottle: Gram Negative Rods  Growth in anaerobic bottle: Gram Negative Rods    (otherwise reviewed)    RADIOLOGY:    4/10 XR:     FINDINGS:      Technically very limited study. Made and upper abdomen is only partially included within the field-of-view. No abnormal bowel distention.      IMPRESSION:     Very limited study demonstrates no abnormal bowel distention.

## 2020-04-15 NOTE — PROGRESS NOTE ADULT - SUBJECTIVE AND OBJECTIVE BOX
ANTONIOAL, DIOR  74y  Female      Patient is a 74y old  Female who presents with a chief complaint of LE swelling (15 Apr 2020 10:09)      INTERVAL HPI/OVERNIGHT EVENTS:  Pt feeling ok. No fevers, chills, SOB. Pt endorses if she has to have HD indefinitely pt prefers to be comfort care.     Medications:  acetaminophen   Tablet .. 650 milliGRAM(s) Oral every 6 hours PRN  apixaban 5 milliGRAM(s) Oral two times a day  buMETAnide IVPB 4 milliGRAM(s) IV Intermittent once  cefTRIAXone   IVPB 1000 milliGRAM(s) IV Intermittent every 24 hours  dextrose 40% Gel 15 Gram(s) Oral once PRN  dextrose 40% Gel 15 Gram(s) Oral once PRN  dextrose 5%. 1000 milliLiter(s) IV Continuous <Continuous>  dextrose 50% Injectable 12.5 Gram(s) IV Push once  dextrose 50% Injectable 12.5 Gram(s) IV Push once  glucagon  Injectable 1 milliGRAM(s) IntraMuscular once PRN  HYDROmorphone  Injectable 0.5 milliGRAM(s) IV Push every 8 hours PRN  insulin lispro (HumaLOG) corrective regimen sliding scale   SubCutaneous Before meals and at bedtime  metoprolol tartrate 12.5 milliGRAM(s) Oral two times a day  midodrine. 10 milliGRAM(s) Oral <User Schedule> PRN  ondansetron Injectable 4 milliGRAM(s) IV Push every 8 hours PRN  silver sulfADIAZINE 1% Cream 1 Application(s) Topical daily  simethicone 80 milliGRAM(s) Chew two times a day PRN      REVIEW OF SYSTEMS:  as per HPI, otherwise negative    T(C): 37.1 (04-15-20 @ 06:26), Max: 37.1 (04-15-20 @ 06:26)  HR: 103 (04-15-20 @ 06:26) (103 - 118)  BP: 115/54 (04-15-20 @ 06:26) (110/54 - 129/62)  RR: 18 (04-15-20 @ 06:26) (18 - 20)  SpO2: 100% (04-15-20 @ 06:26) (100% - 100%)  Wt(kg): --Vital Signs Last 24 Hrs  T(C): 37.1 (15 Apr 2020 06:26), Max: 37.1 (15 Apr 2020 06:26)  T(F): 98.7 (15 Apr 2020 06:26), Max: 98.7 (15 Apr 2020 06:26)  HR: 103 (15 Apr 2020 06:26) (103 - 118)  BP: 115/54 (15 Apr 2020 06:26) (110/54 - 129/62)  BP(mean): --  RR: 18 (15 Apr 2020 06:26) (18 - 20)  SpO2: 100% (15 Apr 2020 06:26) (100% - 100%)    PHYSICAL EXAM:    Gen: NAD  HEENT: supple neck  Pulm: diminished bibasilar breath sounds  CV: RRR, S1S2; no rub  Abd: +BS, soft  : + Cevallos catheter   LE: Pitting edema, ? lymphedema b/l  Skin: Right dorsal foot wound  Vascular access: LIJ non tunneled HD catheter, no bleeding noted  Psych: A & O X 1    Consultant(s) Notes Reviewed:  [x ] YES  [ ] NO  Care Discussed with Consultants/Other Providers [ x] YES  [ ] NO    LABS:                        7.5    12.97 )-----------( 163      ( 15 Apr 2020 06:20 )             25.7     04-15    141  |  100  |  60<H>  ----------------------------<  140<H>  5.3   |  28  |  2.84<H>    Ca    8.9      15 Apr 2020 06:20  Phos  7.4     04-14  Mg     2.1     04-14          CAPILLARY BLOOD GLUCOSE      POCT Blood Glucose.: 233 mg/dL (15 Apr 2020 12:44)  POCT Blood Glucose.: 183 mg/dL (15 Apr 2020 08:59)  POCT Blood Glucose.: 121 mg/dL (14 Apr 2020 22:08)  POCT Blood Glucose.: 190 mg/dL (14 Apr 2020 19:57)  POCT Blood Glucose.: 204 mg/dL (14 Apr 2020 18:07)  POCT Blood Glucose.: 207 mg/dL (14 Apr 2020 16:04)  POCT Blood Glucose.: 175 mg/dL (14 Apr 2020 14:01)            RADIOLOGY & ADDITIONAL TESTS:    Imaging Personally Reviewed:  [ ] YES  [ ] NO    HEALTH ISSUES - PROBLEM Dx:  Pancreatic mass: Pancreatic mass  Anemia due to blood loss: Anemia due to blood loss  Hypertension, unspecified type: Hypertension, unspecified type  E coli bacteremia: E coli bacteremia  Rheumatoid arthritis of hip, unspecified laterality, unspecified rheumatoid factor presence: Rheumatoid arthritis of hip, unspecified laterality, unspecified rheumatoid factor presence  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 complication: 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 complication  Acute renal failure, unspecified acute renal failure type: Acute renal failure, unspecified acute renal failure type  Renal failure, unspecified chronicity: Renal failure, unspecified chronicity  Rheumatoid arthritis: Rheumatoid arthritis  SIRS (systemic inflammatory response syndrome): SIRS (systemic inflammatory response syndrome)  Discharge planning issues: Discharge planning issues  Prophylactic measure: Prophylactic measure  Bronchiectasis: Bronchiectasis  Hypertension: Hypertension  Type 2 diabetes mellitus: Type 2 diabetes mellitus  Acute on chronic diastolic (congestive) heart failure: Acute on chronic diastolic (congestive) heart failure  Hyperkalemia: Hyperkalemia  Acute renal failure: Acute renal failure

## 2020-04-15 NOTE — PROGRESS NOTE ADULT - PROBLEM SELECTOR PLAN 1
Pt. initially presented with severe hyperkalemia in the setting of NBA requiring urgent HD. Serum potassium today is 5.3 post HD (reduced time). Recommend to give Bumex 4 mg IV STAT. Low potassium diet. Monitor urine output.  Monitor serum potassium

## 2020-04-15 NOTE — CHART NOTE - NSCHARTNOTEFT_GEN_A_CORE
The patient is a 74 y.o F w/ PMH HLD, HTN, insulin dependent T2DM, RA (on chronic prednisone, leflunomide, and hydroxychloroquine) c/b bronchiectasis (on home O2 4 L NC) who p/w NBA on CKD now requiring HD, diastolic heart failure. Psychiatry was consulted to assess patient's capacity to refuse long term HD.     Discussed with medicine attending Dr Bosch. As per Dr Bosch, patient has been getting HD but nephrology will decide if patient needs long term HD. Patient has now been started on Bumex considering oliguria. If trial of Bumex does not work (24-48 hours), long term HD will be recommended.     Patient has made statements that if long term HD is recommended, she would prefer to be CMO. Hence this psychiatry consult request to assess her capacity to make this decision.     Discussed with Dr Bosch that considering long term HD is not even recommended at this time,  it would be in the patient's best interest to first await nephrology's final recommendations on it based on Bumex trial. Depending on the recommendation, a capacity evaluation would be more appropriate at that time.     Dr Bosch is in agreement and will call if a consult is needed.   In the mean time I have also advised Dr Bosch to reach out to patient's family to coordinate care.    Kishore Crabtree MD  Attending psychiatrist  Spec: [id number]

## 2020-04-15 NOTE — PROGRESS NOTE ADULT - ASSESSMENT
A 74 y.o F w/ PMH HLD, HTN, insulin dependent T2DM, RA (on chronic prednisone, leflunomide, and hydroxychloroquine) c/b bronchiectasis (on home O2 4 L NC) who p/w NBA on CKD now requiring HD, diastolic heart failure.  #Acute renal failure, unspecified acute renal failure type.- s/p NaHCO3 drip, s/p urgent dialysis, now w rick on HD. s/p  bleeding from catheter site. No more bleeding at the site  -renal to determine if pt needs indefinite HD. Per pt, pt pt prefers comfort care and does not want HD indefinitely. Will touch base with family.  -currently on trial of bumex given pt oliguric.     #$Anemia- s/p acute blood loss from catheter initially however now likely 2/2 to ACD and ESRD, s/p  DDAVP, s/p blood transfusion for recurrent anemia. Iron studies not consistent w   iron deficiency anemia.     #E coli bacteremia: -ceftriaxone, repeat blood cx on 4/12 cleared    #Pancreatic mass-Incidental finding of large 16cm tail of Pancreas mass - need to discuss GOC w family before considering further work up     #COVID-19 testing negative, CXR clear     #Anasarca r/o DVT on b/l LE and RUE (swollen more than rest of extremities) US, likely from renal failure      #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 complication-sliding scale     #Hypertension, unspecified type-Monitor BP    #RA: Pt on pred 17.5mg for RA? Unknown for how long she has been on this dose. Attempting to contact rheumatologist. Will lower to pred 15mg for now.    #Afib: CHADVASC score of 4; TSH wnl and TTE w no gross abnormalities; rate control w metoprolol and a/c give no signs of bleeding.     #Prophylactic measure. -on full a/c w eliquis     #GOC: pt says she wishes to be comfort care if HD is required indefinitely. Need to speak w family. Need to assess capacity depending on talks w family.

## 2020-04-15 NOTE — PROGRESS NOTE ADULT - SUBJECTIVE AND OBJECTIVE BOX
Cardiology Attending Progress Note    Otherwise hemodynamically stable  Remains on 4L NC  HR stable --> low 100s bpm  Receiving IV ceftriaxone for E.coli bacteremia  Blood ctx x 2 sets 4/12/20 NGTD  Leukocytosis improving  K improved --> 5    Vital Signs Last 24 Hrs  T(C): 37.1 (15 Apr 2020 06:26), Max: 37.1 (15 Apr 2020 06:26)  T(F): 98.7 (15 Apr 2020 06:26), Max: 98.7 (15 Apr 2020 06:26)  HR: 103 (15 Apr 2020 06:26) (102 - 118)  BP: 115/54 (15 Apr 2020 06:26) (110/54 - 138/61)  BP(mean): --  RR: 18 (15 Apr 2020 06:26) (18 - 20)  SpO2: 100% (15 Apr 2020 06:26) (100% - 100%)      I&O's Detail    14 Apr 2020 07:01  -  15 Apr 2020 07:00  --------------------------------------------------------  IN:    Other: 700 mL  Total IN: 700 mL    OUT:    Other: 1200 mL  Total OUT: 1200 mL    Total NET: -500 mL      Appearance: NAD  Cardiovascular: Irreg irreg S1 S2, No JVD, No murmurs, + edema  Respiratory: Decreased breath sounds bilaterally  Psychiatry: Awake, alert    MEDICATIONS  (STANDING):  apixaban 5 milliGRAM(s) Oral two times a day  cefTRIAXone   IVPB 1000 milliGRAM(s) IV Intermittent every 24 hours  dextrose 5%. 1000 milliLiter(s) (50 mL/Hr) IV Continuous <Continuous>  dextrose 50% Injectable 12.5 Gram(s) IV Push once  dextrose 50% Injectable 12.5 Gram(s) IV Push once  insulin lispro (HumaLOG) corrective regimen sliding scale   SubCutaneous Before meals and at bedtime  metoprolol tartrate 12.5 milliGRAM(s) Oral two times a day  predniSONE   Tablet 17.5 milliGRAM(s) Oral daily  silver sulfADIAZINE 1% Cream 1 Application(s) Topical daily    MEDICATIONS  (PRN):  acetaminophen   Tablet .. 650 milliGRAM(s) Oral every 6 hours PRN Temp greater or equal to 38C (100.4F), Mild Pain (1 - 3), Moderate Pain (4 - 6)  dextrose 40% Gel 15 Gram(s) Oral once PRN Blood Glucose LESS THAN 70 milliGRAM(s)/deciliter  dextrose 40% Gel 15 Gram(s) Oral once PRN Blood Glucose LESS THAN 70 milliGRAM(s)/deciliter  glucagon  Injectable 1 milliGRAM(s) IntraMuscular once PRN Glucose LESS THAN 70 milligrams/deciliter  HYDROmorphone  Injectable 0.5 milliGRAM(s) IV Push every 8 hours PRN Severe Pain (7 - 10)  midodrine. 10 milliGRAM(s) Oral <User Schedule> PRN Pre-HD for Hypotension  ondansetron Injectable 4 milliGRAM(s) IV Push every 8 hours PRN Nausea and/or Vomiting  simethicone 80 milliGRAM(s) Chew two times a day PRN Upset Stomach    LABS:	 	                                                           7.5    12.97 )-----------( 163      ( 15 Apr 2020 06:20 )             25.7   04-14    137  |  97<L>  |  55<H>  ----------------------------<  159<H>  5.0   |  25  |  2.41<H>    Ca    8.9      14 Apr 2020 22:20  Phos  7.4     04-14  Mg     2.1     04-14        PREVIOUS DIAGNOSTIC TESTING:      DIMENSIONS:  Dimensions:     Normal Values:  LA:     3.3 cm    2.0 - 4.0 cm  Ao:     2.8 cm    2.0 - 3.8 cm  SEPTUM: 0.8 cm    0.6- 1.2 cm  PWT:    0.9 cm    0.6 - 1.1 cm  LVIDd:  4.4 cm    3.0 - 5.6 cm  LVIDs:  3.0 cm    1.8 - 4.0 cm  Derived Variables:  LVMI: 58 g/m2  RWT: 0.40  Fractional short: 32 %  Ejection Fraction (Teicholtz): 60 %  ------------------------------------------------------------------------  OBSERVATIONS:  Mitral Valve: Mitral annular calcification, otherwise  normal mitral valve. Minimal mitral regurgitation.  Aortic Root: Normal aortic root.  Aortic Valve: Calcified trileaflet aortic valve with normal  opening. Minimal aortic regurgitation.  Left Atrium: Normal left atrium.  LA volume index = 26  cc/m2.  Left Ventricle: Endocardium not well visualized; grossly  normal left ventricular systolic function. Normal left  ventricular internal dimensions and wall thicknesses.  Right Heart: Normal right atrium. The right ventricle is  not well visualized; grossly normal right ventricular  systolic function. Normal tricuspid valve.  Mild tricuspid  regurgitation. Normal pulmonic valve. Minimal pulmonic  regurgitation.  Pericardium/PleuraNormal pericardium with no pericardial  effusion.  ------------------------------------------------------------------------  CONCLUSIONS:  1. Mitral annular calcification, otherwise normal mitral  valve. Minimal mitral regurgitation.  2. Normal left ventricular internal dimensions and wall  thicknesses.  3. Endocardium not well visualized; grossly normal left  ventricular systolic function.  4. The right ventricle is not well visualized; grossly  normal right ventricular systolic function.    Last stress test less than a year in Dr. Mays's office       < from: CT Abdomen and Pelvis No Cont (04.08.20 @ 11:00) >    EXAM:  CT ABDOMEN AND PELVIS        PROCEDURE DATE:  Apr 8 2020         INTERPRETATION:  CLINICAL INFORMATION: bacteremia ADMDIAG1: E87.5 E87.5/ evaluate source of bacteremia    COMPARISON: None.    PROCEDURE:   CT of the Abdomen and Pelvis was performed without intravenous contrast.   Intravenous contrast: None.  Oral contrast: None.  Sagittal and coronal reformats were performed.    FINDINGS:    LOWER CHEST: Small pleural effusions.  Right IJ line. A 2.5 cm calcified right pleural nodule in the lower lobe.    LIVER: Subcentimeter lesions too small to characterize.   BILE DUCTS: Normal caliber.  GALLBLADDER: Within normal limits.  SPLEEN: Within normal limits.   PANCREAS: There is a 16 x 9 cm complex cystic mass likely arising from the pancreatic tail with multiple septations of which are calcified.  ADRENALS: Within normal limits.   KIDNEYS/URETERS: Cysts.    BLADDER: Cevallos catheter in place.   REPRODUCTIVE ORGANS: Adnexal cysts measuring up to 4 cm. Small calcified fibroid.    BOWEL: No bowel obstruction.  PERITONEUM: No ascites.   VESSELS:  Within normal limits.  RETROPERITONEUM/LYMPH NODES: No lymphadenopathy.     ABDOMINAL WALL: Within normal limits.  BONES: Within normal limits.     IMPRESSION: Indeterminate 16 cm left upperquadrant complex cystic mass likely arising from the pancreatic tail. 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    Right lower lobe calcified pleural nodule.        TATE TAPIA M.D., ATTENDING RADIOLOGIST  This document has been electronically signed. Apr 8 2020 11:12AM

## 2020-04-15 NOTE — PROGRESS NOTE ADULT - SUBJECTIVE AND OBJECTIVE BOX
[facility] DIVISION OF KIDNEY DISEASES AND HYPERTENSION -- FOLLOW UP NOTE  --------------------------------------------------------------------------------  HPI: 75 yo F with RA c/b chronic bronchiectasis, HTN, and DM2 is admitted with SOB. Pt. currently COVID-19 rule out. Nephrology team is consulted for NBA and hyperkalemia. On admission (4/4/20), Scr was elevated to 2.82, which as worsened to 3.31 on 4/5/20. Serum potassium was elevated to 7.5 on admission, and despite multiple rounds of medical management, remained elevated at 6.9, so patient was taken for urgent HD on 4/6/20. Patient developed Aflutter within 10 minutes of HD on 4/10/20 and continued to have bleeding from catheter site so HD treatment was stopped. Last HD session was on 4/14/20 but patient had treatment time reduced.    PAST HISTORY  --------------------------------------------------------------------------------  No significant changes to PMH, PSH, FHx, SHx, unless otherwise noted    ALLERGIES & MEDICATIONS  --------------------------------------------------------------------------------  Allergies    No Known Allergies    Intolerances    Standing Inpatient Medications  apixaban 5 milliGRAM(s) Oral two times a day  cefTRIAXone   IVPB 1000 milliGRAM(s) IV Intermittent every 24 hours  dextrose 5%. 1000 milliLiter(s) IV Continuous <Continuous>  dextrose 50% Injectable 12.5 Gram(s) IV Push once  dextrose 50% Injectable 12.5 Gram(s) IV Push once  insulin lispro (HumaLOG) corrective regimen sliding scale   SubCutaneous Before meals and at bedtime  metoprolol tartrate 12.5 milliGRAM(s) Oral two times a day  predniSONE   Tablet 17.5 milliGRAM(s) Oral daily  silver sulfADIAZINE 1% Cream 1 Application(s) Topical daily    REVIEW OF SYSTEMS  --------------------------------------------------------------------------------  Gen: + lethargy  Respiratory: mild dyspnea  CV: No chest pain  GI: No abdominal pain  MSK: + LE edema    All other systems were reviewed and are negative, except as noted.    VITALS/PHYSICAL EXAM  --------------------------------------------------------------------------------  T(C): 37.1 (04-15-20 @ 06:26), Max: 37.1 (04-15-20 @ 06:26)  HR: 103 (04-15-20 @ 06:26) (102 - 118)  BP: 115/54 (04-15-20 @ 06:26) (110/54 - 138/61)  RR: 18 (04-15-20 @ 06:26) (18 - 20)  SpO2: 100% (04-15-20 @ 06:26) (100% - 100%)  Wt(kg): --    04-14-20 @ 07:01  -  04-15-20 @ 07:00  --------------------------------------------------------  IN: 1200 mL / OUT: 1350 mL / NET: -150 mL    Physical Exam:  	Gen: NAD  	HEENT: supple neck  	Pulm: diminished bibasilar breath sounds  	CV: RRR, S1S2; no rub  	Abd: +BS, soft  	: + Cevallos catheter   	LE: Pitting edema, ? lymphedema b/l  	Skin: Right dorsal foot wound  	Vascular access: LIJ non tunneled HD catheter, no bleeding noted    LABS/STUDIES  --------------------------------------------------------------------------------              7.5    12.97 >-----------<  163      [04-15-20 @ 06:20]              25.7     141  |  100  |  60  ----------------------------<  140      [04-15-20 @ 06:20]  5.3   |  28  |  2.84        Ca     8.9     [04-15-20 @ 06:20]      Mg     2.1     [04-14-20 @ 06:00]      Phos  7.4     [04-14-20 @ 06:00]    Creatinine Trend:  SCr 2.84 [04-15 @ 06:20]  SCr 2.41 [04-14 @ 22:20]  SCr 3.05 [04-14 @ 06:00]  SCr 2.56 [04-13 @ 04:00]  SCr 2.36 [04-12 @ 07:20]    Urinalysis - [04-04-20 @ 21:00]      Color YELLOW / Appearance CLEAR / SG 1.019 / pH 6.0      Gluc NEGATIVE / Ketone NEGATIVE  / Bili NEGATIVE / Urobili 0.2       Blood NEGATIVE / Protein MODERATE / Leuk Est SMALL / Nitrite NEGATIVE      RBC NONE / WBC 2-5 / Hyaline  / Gran  / Sq Epi  / Non Sq Epi FEW / Bacteria MODERATE    Iron 24, TIBC 135, %sat --      [04-14-20 @ 06:00]  Ferritin 1068      [04-14-20 @ 06:00]  HbA1c 6.4      [04-05-20 @ 06:20]  TSH 2.98      [04-14-20 @ 06:00]    HBsAg NEGATIVE      [04-05-20 @ 12:00]  HCV 0.21, Nonreactive Hepatitis C AB

## 2020-04-15 NOTE — PROGRESS NOTE ADULT - ASSESSMENT
75 yo F w/ PMH HLD, HTN, insulin dependent T2DM, RA (on chronic prednisone, leflunomide, and hydroxychloroquine) c/b bronchiectasis (on home O2 4 L NC) who p/w worsening LE swelling  Leukocytosis, no fever  E coli bacteremia, unclear source, S to CTX  COVID neg, CXR clear, no fevers--would not retest as we have alternate explanation for inflammatory response  CT A/P reassuring but with pancreatic lesion  Overall,  1) E coli bacteremia  - Ceftriaxone 1g q 24 through 4/24/20  - F/U BCXs for clearance  - Anticipate would complete treatment course with PO Cipro if DC planning  2) R/O COVID  - CXR clear, no fevers, COVID PCR neg, lower suspicion for COVID  3) Leukocytosis  - Unclear why persistent, reactive to pancreatic neoplasm?  - Trend to normal  4) LE wounds  - Wound care per primary team  5) Pancreatic lesion  - Care/workup pe primary team    Oral Peacock MD  Pager [phone]  After 5pm and on weekends call [phone]

## 2020-04-15 NOTE — PROGRESS NOTE ADULT - PROBLEM SELECTOR PLAN 2
Pt. with likely NBA in the setting of infection, low BP, and decreased oral intake. Pt. with probable CKD in the setting of long-standing history of DM and HTN. Pt. with normal Scr of 0.96 on 6/7/16, no recent labs. On admission (4/4/20), Scr was elevated to 2.82, repeat today is 2.84 post HD and patient remains oliguric. Recommend to give Bumex 4 mg IV STAT and monitor for response. Monitor labs, monitor urine output.     If any questions, please feel free to contact me  Oliver Ricci   Nephrology Fellow  [phone]  (After 5 pm or on weekends please page the on-call fellow)

## 2020-04-15 NOTE — PROGRESS NOTE ADULT - ATTENDING COMMENTS
Acute kidney injury - review of chart done> unsure etiology of NBA but likely atn in the setting of hypotension along with possible volume overload.   she likely had some CKD even prior to coming in ( no labs since 2016) and admitted with a creat of 2.8/Gnt404/K-7.5- initiated dialysis.   urinalysis with moderate protein but no blood- likely chronic.       - Bumex challenge today-  4 mg iv today and monitor urine output   - If no response, we can change catheter to Permcath and will plan for dialysis

## 2020-04-16 DIAGNOSIS — J44.9 CHRONIC OBSTRUCTIVE PULMONARY DISEASE, UNSPECIFIED: ICD-10-CM

## 2020-04-16 LAB
ALBUMIN SERPL ELPH-MCNC: 2.1 G/DL — LOW (ref 3.3–5)
ALP SERPL-CCNC: 164 U/L — HIGH (ref 40–120)
ALT FLD-CCNC: 18 U/L — SIGNIFICANT CHANGE UP (ref 4–33)
ANION GAP SERPL CALC-SCNC: 15 MMO/L — HIGH (ref 7–14)
ANION GAP SERPL CALC-SCNC: 16 MMO/L — HIGH (ref 7–14)
AST SERPL-CCNC: 10 U/L — SIGNIFICANT CHANGE UP (ref 4–32)
BASOPHILS # BLD AUTO: 0.06 K/UL — SIGNIFICANT CHANGE UP (ref 0–0.2)
BASOPHILS NFR BLD AUTO: 0.5 % — SIGNIFICANT CHANGE UP (ref 0–2)
BASOPHILS NFR SPEC: 0 % — SIGNIFICANT CHANGE UP (ref 0–2)
BILIRUB SERPL-MCNC: 0.2 MG/DL — SIGNIFICANT CHANGE UP (ref 0.2–1.2)
BLASTS # FLD: 0 % — SIGNIFICANT CHANGE UP (ref 0–0)
BUN SERPL-MCNC: 79 MG/DL — HIGH (ref 7–23)
BUN SERPL-MCNC: 85 MG/DL — HIGH (ref 7–23)
CALCIUM SERPL-MCNC: 8.5 MG/DL — SIGNIFICANT CHANGE UP (ref 8.4–10.5)
CALCIUM SERPL-MCNC: 8.5 MG/DL — SIGNIFICANT CHANGE UP (ref 8.4–10.5)
CHLORIDE SERPL-SCNC: 100 MMOL/L — SIGNIFICANT CHANGE UP (ref 98–107)
CHLORIDE SERPL-SCNC: 100 MMOL/L — SIGNIFICANT CHANGE UP (ref 98–107)
CO2 SERPL-SCNC: 24 MMOL/L — SIGNIFICANT CHANGE UP (ref 22–31)
CO2 SERPL-SCNC: 25 MMOL/L — SIGNIFICANT CHANGE UP (ref 22–31)
CREAT SERPL-MCNC: 3.34 MG/DL — HIGH (ref 0.5–1.3)
CREAT SERPL-MCNC: 3.37 MG/DL — HIGH (ref 0.5–1.3)
CULTURE RESULTS: SIGNIFICANT CHANGE UP
DACRYOCYTES BLD QL SMEAR: SLIGHT — SIGNIFICANT CHANGE UP
EOSINOPHIL # BLD AUTO: 0.04 K/UL — SIGNIFICANT CHANGE UP (ref 0–0.5)
EOSINOPHIL NFR BLD AUTO: 0.3 % — SIGNIFICANT CHANGE UP (ref 0–6)
EOSINOPHIL NFR FLD: 0 % — SIGNIFICANT CHANGE UP (ref 0–6)
GIANT PLATELETS BLD QL SMEAR: PRESENT — SIGNIFICANT CHANGE UP
GLUCOSE BLDC GLUCOMTR-MCNC: 244 MG/DL — HIGH (ref 70–99)
GLUCOSE BLDC GLUCOMTR-MCNC: 254 MG/DL — HIGH (ref 70–99)
GLUCOSE BLDC GLUCOMTR-MCNC: 261 MG/DL — HIGH (ref 70–99)
GLUCOSE BLDC GLUCOMTR-MCNC: 305 MG/DL — HIGH (ref 70–99)
GLUCOSE BLDC GLUCOMTR-MCNC: 309 MG/DL — HIGH (ref 70–99)
GLUCOSE BLDC GLUCOMTR-MCNC: 324 MG/DL — HIGH (ref 70–99)
GLUCOSE SERPL-MCNC: 202 MG/DL — HIGH (ref 70–99)
GLUCOSE SERPL-MCNC: 293 MG/DL — HIGH (ref 70–99)
HCT VFR BLD CALC: 27.1 % — LOW (ref 34.5–45)
HCYS SERPL-MCNC: 26.9 UMOL/L — HIGH
HGB BLD-MCNC: 7.6 G/DL — LOW (ref 11.5–15.5)
HYPOCHROMIA BLD QL: SIGNIFICANT CHANGE UP
IMM GRANULOCYTES NFR BLD AUTO: 6 % — HIGH (ref 0–1.5)
LYMPHOCYTES # BLD AUTO: 0.41 K/UL — LOW (ref 1–3.3)
LYMPHOCYTES # BLD AUTO: 3.2 % — LOW (ref 13–44)
LYMPHOCYTES NFR SPEC AUTO: 2.7 % — LOW (ref 13–44)
MAGNESIUM SERPL-MCNC: 2.2 MG/DL — SIGNIFICANT CHANGE UP (ref 1.6–2.6)
MAGNESIUM SERPL-MCNC: 2.2 MG/DL — SIGNIFICANT CHANGE UP (ref 1.6–2.6)
MCHC RBC-ENTMCNC: 28 % — LOW (ref 32–36)
MCHC RBC-ENTMCNC: 28.4 PG — SIGNIFICANT CHANGE UP (ref 27–34)
MCV RBC AUTO: 101.1 FL — HIGH (ref 80–100)
METAMYELOCYTES # FLD: 0 % — SIGNIFICANT CHANGE UP (ref 0–1)
MONOCYTES # BLD AUTO: 0.64 K/UL — SIGNIFICANT CHANGE UP (ref 0–0.9)
MONOCYTES NFR BLD AUTO: 5 % — SIGNIFICANT CHANGE UP (ref 2–14)
MONOCYTES NFR BLD: 4.4 % — SIGNIFICANT CHANGE UP (ref 2–9)
MYELOCYTES NFR BLD: 0 % — SIGNIFICANT CHANGE UP (ref 0–0)
NEUTROPHIL AB SER-ACNC: 89.4 % — HIGH (ref 43–77)
NEUTROPHILS # BLD AUTO: 10.99 K/UL — HIGH (ref 1.8–7.4)
NEUTROPHILS NFR BLD AUTO: 85 % — HIGH (ref 43–77)
NEUTS BAND # BLD: 3.5 % — SIGNIFICANT CHANGE UP (ref 0–6)
NRBC # BLD: 1 /100WBC — SIGNIFICANT CHANGE UP
NRBC # FLD: 0.05 K/UL — SIGNIFICANT CHANGE UP (ref 0–0)
OTHER - HEMATOLOGY %: 0 — SIGNIFICANT CHANGE UP
PLATELET # BLD AUTO: 174 K/UL — SIGNIFICANT CHANGE UP (ref 150–400)
PLATELET COUNT - ESTIMATE: NORMAL — SIGNIFICANT CHANGE UP
PMV BLD: 11.7 FL — SIGNIFICANT CHANGE UP (ref 7–13)
POIKILOCYTOSIS BLD QL AUTO: SLIGHT — SIGNIFICANT CHANGE UP
POLYCHROMASIA BLD QL SMEAR: SLIGHT — SIGNIFICANT CHANGE UP
POTASSIUM SERPL-MCNC: 5.7 MMOL/L — HIGH (ref 3.5–5.3)
POTASSIUM SERPL-MCNC: 6.1 MMOL/L — HIGH (ref 3.5–5.3)
POTASSIUM SERPL-SCNC: 5.7 MMOL/L — HIGH (ref 3.5–5.3)
POTASSIUM SERPL-SCNC: 6.1 MMOL/L — HIGH (ref 3.5–5.3)
PROMYELOCYTES # FLD: 0 % — SIGNIFICANT CHANGE UP (ref 0–0)
PROT SERPL-MCNC: 5.6 G/DL — LOW (ref 6–8.3)
RBC # BLD: 2.68 M/UL — LOW (ref 3.8–5.2)
RBC # FLD: 16.8 % — HIGH (ref 10.3–14.5)
SCHISTOCYTES BLD QL AUTO: SLIGHT — SIGNIFICANT CHANGE UP
SODIUM SERPL-SCNC: 139 MMOL/L — SIGNIFICANT CHANGE UP (ref 135–145)
SODIUM SERPL-SCNC: 141 MMOL/L — SIGNIFICANT CHANGE UP (ref 135–145)
SPECIMEN SOURCE: SIGNIFICANT CHANGE UP
VARIANT LYMPHS # BLD: 0 % — SIGNIFICANT CHANGE UP
WBC # BLD: 12.91 K/UL — HIGH (ref 3.8–10.5)
WBC # FLD AUTO: 12.91 K/UL — HIGH (ref 3.8–10.5)

## 2020-04-16 PROCEDURE — 99232 SBSQ HOSP IP/OBS MODERATE 35: CPT | Mod: CS

## 2020-04-16 PROCEDURE — 99232 SBSQ HOSP IP/OBS MODERATE 35: CPT

## 2020-04-16 PROCEDURE — 99222 1ST HOSP IP/OBS MODERATE 55: CPT | Mod: GC

## 2020-04-16 PROCEDURE — 99232 SBSQ HOSP IP/OBS MODERATE 35: CPT | Mod: GC

## 2020-04-16 RX ORDER — INSULIN GLARGINE 100 [IU]/ML
5 INJECTION, SOLUTION SUBCUTANEOUS AT BEDTIME
Refills: 0 | Status: DISCONTINUED | OUTPATIENT
Start: 2020-04-16 | End: 2020-04-18

## 2020-04-16 RX ORDER — INSULIN HUMAN 100 [IU]/ML
5 INJECTION, SOLUTION SUBCUTANEOUS ONCE
Refills: 0 | Status: COMPLETED | OUTPATIENT
Start: 2020-04-16 | End: 2020-04-16

## 2020-04-16 RX ORDER — SODIUM ZIRCONIUM CYCLOSILICATE 10 G/10G
10 POWDER, FOR SUSPENSION ORAL DAILY
Refills: 0 | Status: COMPLETED | OUTPATIENT
Start: 2020-04-17 | End: 2020-04-21

## 2020-04-16 RX ORDER — SODIUM ZIRCONIUM CYCLOSILICATE 10 G/10G
10 POWDER, FOR SUSPENSION ORAL ONCE
Refills: 0 | Status: COMPLETED | OUTPATIENT
Start: 2020-04-16 | End: 2020-04-16

## 2020-04-16 RX ORDER — DEXTROSE 50 % IN WATER 50 %
50 SYRINGE (ML) INTRAVENOUS ONCE
Refills: 0 | Status: COMPLETED | OUTPATIENT
Start: 2020-04-16 | End: 2020-04-16

## 2020-04-16 RX ORDER — BUMETANIDE 0.25 MG/ML
2 INJECTION INTRAMUSCULAR; INTRAVENOUS ONCE
Refills: 0 | Status: COMPLETED | OUTPATIENT
Start: 2020-04-16 | End: 2020-04-16

## 2020-04-16 RX ADMIN — Medication 12.5 MILLIGRAM(S): at 18:18

## 2020-04-16 RX ADMIN — Medication 2: at 09:46

## 2020-04-16 RX ADMIN — INSULIN HUMAN 5 UNIT(S): 100 INJECTION, SOLUTION SUBCUTANEOUS at 12:48

## 2020-04-16 RX ADMIN — Medication 12.5 MILLIGRAM(S): at 05:46

## 2020-04-16 RX ADMIN — Medication 3: at 22:55

## 2020-04-16 RX ADMIN — Medication 4: at 16:55

## 2020-04-16 RX ADMIN — APIXABAN 5 MILLIGRAM(S): 2.5 TABLET, FILM COATED ORAL at 05:44

## 2020-04-16 RX ADMIN — BUMETANIDE 2 MILLIGRAM(S): 0.25 INJECTION INTRAMUSCULAR; INTRAVENOUS at 12:49

## 2020-04-16 RX ADMIN — Medication 50 MILLILITER(S): at 12:48

## 2020-04-16 RX ADMIN — APIXABAN 5 MILLIGRAM(S): 2.5 TABLET, FILM COATED ORAL at 18:18

## 2020-04-16 RX ADMIN — SODIUM ZIRCONIUM CYCLOSILICATE 10 GRAM(S): 10 POWDER, FOR SUSPENSION ORAL at 23:06

## 2020-04-16 RX ADMIN — INSULIN GLARGINE 5 UNIT(S): 100 INJECTION, SOLUTION SUBCUTANEOUS at 22:55

## 2020-04-16 RX ADMIN — SODIUM ZIRCONIUM CYCLOSILICATE 10 GRAM(S): 10 POWDER, FOR SUSPENSION ORAL at 16:09

## 2020-04-16 RX ADMIN — Medication 15 MILLIGRAM(S): at 05:44

## 2020-04-16 RX ADMIN — Medication 1 APPLICATION(S): at 15:23

## 2020-04-16 NOTE — PROGRESS NOTE ADULT - SUBJECTIVE AND OBJECTIVE BOX
[facility] DIVISION OF KIDNEY DISEASES AND HYPERTENSION -- FOLLOW UP NOTE  --------------------------------------------------------------------------------  HPI: 73 yo F with RA c/b chronic bronchiectasis, HTN, and DM2 is admitted with SOB. Pt. currently COVID-19 rule out. Nephrology team is consulted for NBA and hyperkalemia. On admission (4/4/20), Scr was elevated to 2.82, which as worsened to 3.31 on 4/5/20. Serum potassium was elevated to 7.5 on admission, and despite multiple rounds of medical management, remained elevated at 6.9, so patient was taken for urgent HD on 4/6/20. Last HD session was on 4/14/20 with reduced treatment time. Patient given Bumex 4 mg IV yesterday (4/15/20) with about 600 ml urine output.    PAST HISTORY  --------------------------------------------------------------------------------  No significant changes to PMH, PSH, FHx, SHx, unless otherwise noted    ALLERGIES & MEDICATIONS  --------------------------------------------------------------------------------  Allergies    No Known Allergies    Intolerances    Standing Inpatient Medications  apixaban 5 milliGRAM(s) Oral two times a day  dextrose 5%. 1000 milliLiter(s) IV Continuous <Continuous>  dextrose 50% Injectable 12.5 Gram(s) IV Push once  dextrose 50% Injectable 12.5 Gram(s) IV Push once  insulin lispro (HumaLOG) corrective regimen sliding scale   SubCutaneous Before meals and at bedtime  metoprolol tartrate 12.5 milliGRAM(s) Oral two times a day  predniSONE   Tablet 15 milliGRAM(s) Oral daily  silver sulfADIAZINE 1% Cream 1 Application(s) Topical daily    REVIEW OF SYSTEMS  --------------------------------------------------------------------------------  Gen: + lethargy  Respiratory: mild dyspnea  CV: No chest pain  GI: No abdominal pain  MSK: + LE edema    All other systems were reviewed and are negative, except as noted.    VITALS/PHYSICAL EXAM  --------------------------------------------------------------------------------  T(C): 37.2 (04-16-20 @ 05:42), Max: 37.2 (04-15-20 @ 17:01)  HR: 99 (04-16-20 @ 05:42) (99 - 101)  BP: 132/60 (04-16-20 @ 05:42) (123/59 - 132/60)  RR: 16 (04-16-20 @ 05:42) (16 - 18)  SpO2: 96% (04-16-20 @ 05:42) (96% - 100%)  Wt(kg): --    04-15-20 @ 07:01  -  04-16-20 @ 07:00  --------------------------------------------------------  IN: 250 mL / OUT: 600 mL / NET: -350 mL    04-16-20 @ 07:01 - 04-16-20 @ 09:45  --------------------------------------------------------  IN: 0 mL / OUT: 50 mL / NET: -50 mL    Physical Exam:  	Gen: NAD  	HEENT: supple neck  	Pulm: diminished bibasilar breath sounds  	CV: RRR, S1S2; no rub  	Abd: +BS, soft  	: + Cevallos catheter   	LE: Pitting edema, ? lymphedema b/l  	Skin: Right dorsal foot wound  	Vascular access: LIJ non tunneled HD catheter, no bleeding noted    LABS/STUDIES  --------------------------------------------------------------------------------              7.5    12.97 >-----------<  163      [04-15-20 @ 06:20]              25.7     141  |  100  |  79  ----------------------------<  202      [04-16-20 @ 07:16]  5.7   |  25  |  3.34        Ca     8.5     [04-16-20 @ 07:16]      Mg     2.2     [04-16-20 @ 07:16]    TPro  5.6  /  Alb  2.1  /  TBili  0.2  /  DBili  x   /  AST  10  /  ALT  18  /  AlkPhos  164  [04-16-20 @ 07:16]    Creatinine Trend:  SCr 3.34 [04-16 @ 07:16]  SCr 2.84 [04-15 @ 06:20]  SCr 2.41 [04-14 @ 22:20]  SCr 3.05 [04-14 @ 06:00]  SCr 2.56 [04-13 @ 04:00]    Urinalysis - [04-04-20 @ 21:00]      Color YELLOW / Appearance CLEAR / SG 1.019 / pH 6.0      Gluc NEGATIVE / Ketone NEGATIVE  / Bili NEGATIVE / Urobili 0.2       Blood NEGATIVE / Protein MODERATE / Leuk Est SMALL / Nitrite NEGATIVE      RBC NONE / WBC 2-5 / Hyaline  / Gran  / Sq Epi  / Non Sq Epi FEW / Bacteria MODERATE    Iron 24, TIBC 135, %sat --      [04-14-20 @ 06:00]  Ferritin 1068      [04-14-20 @ 06:00]  HbA1c 6.4      [04-05-20 @ 06:20]  TSH 2.98      [04-14-20 @ 06:00]    HBsAg NEGATIVE      [04-05-20 @ 12:00]  HCV 0.21, Nonreactive Hepatitis C AB

## 2020-04-16 NOTE — CHART NOTE - NSCHARTNOTEFT_GEN_A_CORE
Pt seen at bedside s/p shiley removal today 4/16. Dressing site clean, dry, intact, without any bleeding, hematomas or ecchymosis. Pt denies any pain or tenderness around site.    Will continue to monitor.

## 2020-04-16 NOTE — PROGRESS NOTE ADULT - PROBLEM SELECTOR PLAN 1
Pt. initially presented with severe hyperkalemia in the setting of NBA requiring urgent HD. Serum potassium today is 5.7. Recommend to give another dose of Bumex 2 mg IV STAT. Start on Lokelma 10 mg daily. Low potassium diet. Monitor urine output.  Monitor serum potassium

## 2020-04-16 NOTE — PROGRESS NOTE ADULT - SUBJECTIVE AND OBJECTIVE BOX
Cardiology Attending Progress Note    Otherwise hemodynamically stable  Remains on 4L NC  HR stable --> 90s bpm -- low 100s bpm  Off abx now --> Status post IV ceftriaxone for E.coli bacteremia  Blood ctx x 2 sets 4/12/20 NGTD  Leukocytosis improving    Restarted on apixaban  On low dose metoprolol for rate control    Vital Signs Last 24 Hrs  T(C): 37.2 (16 Apr 2020 05:42), Max: 37.2 (15 Apr 2020 17:01)  T(F): 99 (16 Apr 2020 05:42), Max: 99 (16 Apr 2020 05:42)  HR: 99 (16 Apr 2020 05:42) (99 - 101)  BP: 132/60 (16 Apr 2020 05:42) (123/59 - 132/60)  BP(mean): --  RR: 16 (16 Apr 2020 05:42) (16 - 18)  SpO2: 96% (16 Apr 2020 05:42) (96% - 100%)    I&O's Detail    15 Apr 2020 07:01  -  16 Apr 2020 07:00  --------------------------------------------------------  IN:    Oral Fluid: 250 mL  Total IN: 250 mL    OUT:    Indwelling Catheter - Urethral: 500 mL    Voided: 100 mL  Total OUT: 600 mL    Total NET: -350 mL      16 Apr 2020 07:01  -  16 Apr 2020 09:00  --------------------------------------------------------  IN:  Total IN: 0 mL    OUT:    Indwelling Catheter - Urethral: 50 mL  Total OUT: 50 mL    Total NET: -50 mL      Appearance: NAD  Cardiovascular: Irreg irreg S1 S2, No JVD, No murmurs, + edema  Respiratory: Decreased breath sounds bilaterally  Psychiatry: Awake, alert    MEDICATIONS  (STANDING):  apixaban 5 milliGRAM(s) Oral two times a day  dextrose 5%. 1000 milliLiter(s) (50 mL/Hr) IV Continuous <Continuous>  dextrose 50% Injectable 12.5 Gram(s) IV Push once  dextrose 50% Injectable 12.5 Gram(s) IV Push once  insulin lispro (HumaLOG) corrective regimen sliding scale   SubCutaneous Before meals and at bedtime  metoprolol tartrate 12.5 milliGRAM(s) Oral two times a day  predniSONE   Tablet 15 milliGRAM(s) Oral daily  silver sulfADIAZINE 1% Cream 1 Application(s) Topical daily    MEDICATIONS  (PRN):  acetaminophen   Tablet .. 650 milliGRAM(s) Oral every 6 hours PRN Temp greater or equal to 38C (100.4F), Mild Pain (1 - 3), Moderate Pain (4 - 6)  dextrose 40% Gel 15 Gram(s) Oral once PRN Blood Glucose LESS THAN 70 milliGRAM(s)/deciliter  dextrose 40% Gel 15 Gram(s) Oral once PRN Blood Glucose LESS THAN 70 milliGRAM(s)/deciliter  glucagon  Injectable 1 milliGRAM(s) IntraMuscular once PRN Glucose LESS THAN 70 milligrams/deciliter  HYDROmorphone  Injectable 0.5 milliGRAM(s) IV Push every 8 hours PRN Severe Pain (7 - 10)  midodrine. 10 milliGRAM(s) Oral <User Schedule> PRN Pre-HD for Hypotension  ondansetron Injectable 4 milliGRAM(s) IV Push every 8 hours PRN Nausea and/or Vomiting  simethicone 80 milliGRAM(s) Chew two times a day PRN Upset Stomach    LABS:	 	                                                            7.5    12.97 )-----------( 163      ( 15 Apr 2020 06:20 )             25.7   04-16    141  |  100  |  79<H>  ----------------------------<  202<H>  5.7<H>   |  25  |  3.34<H>    Ca    8.5      16 Apr 2020 07:16  Mg     2.2     04-16    TPro  5.6<L>  /  Alb  2.1<L>  /  TBili  0.2  /  DBili  x   /  AST  10  /  ALT  18  /  AlkPhos  164<H>  04-16          PREVIOUS DIAGNOSTIC TESTING:      DIMENSIONS:  Dimensions:     Normal Values:  LA:     3.3 cm    2.0 - 4.0 cm  Ao:     2.8 cm    2.0 - 3.8 cm  SEPTUM: 0.8 cm    0.6- 1.2 cm  PWT:    0.9 cm    0.6 - 1.1 cm  LVIDd:  4.4 cm    3.0 - 5.6 cm  LVIDs:  3.0 cm    1.8 - 4.0 cm  Derived Variables:  LVMI: 58 g/m2  RWT: 0.40  Fractional short: 32 %  Ejection Fraction (Teicholtz): 60 %  ------------------------------------------------------------------------  OBSERVATIONS:  Mitral Valve: Mitral annular calcification, otherwise  normal mitral valve. Minimal mitral regurgitation.  Aortic Root: Normal aortic root.  Aortic Valve: Calcified trileaflet aortic valve with normal  opening. Minimal aortic regurgitation.  Left Atrium: Normal left atrium.  LA volume index = 26  cc/m2.  Left Ventricle: Endocardium not well visualized; grossly  normal left ventricular systolic function. Normal left  ventricular internal dimensions and wall thicknesses.  Right Heart: Normal right atrium. The right ventricle is  not well visualized; grossly normal right ventricular  systolic function. Normal tricuspid valve.  Mild tricuspid  regurgitation. Normal pulmonic valve. Minimal pulmonic  regurgitation.  Pericardium/PleuraNormal pericardium with no pericardial  effusion.  ------------------------------------------------------------------------  CONCLUSIONS:  1. Mitral annular calcification, otherwise normal mitral  valve. Minimal mitral regurgitation.  2. Normal left ventricular internal dimensions and wall  thicknesses.  3. Endocardium not well visualized; grossly normal left  ventricular systolic function.  4. The right ventricle is not well visualized; grossly  normal right ventricular systolic function.    Last stress test less than a year in Dr. Mays's office       < from: CT Abdomen and Pelvis No Cont (04.08.20 @ 11:00) >    EXAM:  CT ABDOMEN AND PELVIS        PROCEDURE DATE:  Apr 8 2020         INTERPRETATION:  CLINICAL INFORMATION: bacteremia ADMDIAG1: E87.5 E87.5/ evaluate source of bacteremia    COMPARISON: None.    PROCEDURE:   CT of the Abdomen and Pelvis was performed without intravenous contrast.   Intravenous contrast: None.  Oral contrast: None.  Sagittal and coronal reformats were performed.    FINDINGS:    LOWER CHEST: Small pleural effusions.  Right IJ line. A 2.5 cm calcified right pleural nodule in the lower lobe.    LIVER: Subcentimeter lesions too small to characterize.   BILE DUCTS: Normal caliber.  GALLBLADDER: Within normal limits.  SPLEEN: Within normal limits.   PANCREAS: There is a 16 x 9 cm complex cystic mass likely arising from the pancreatic tail with multiple septations of which are calcified.  ADRENALS: Within normal limits.   KIDNEYS/URETERS: Cysts.    BLADDER: Cevallos catheter in place.   REPRODUCTIVE ORGANS: Adnexal cysts measuring up to 4 cm. Small calcified fibroid.    BOWEL: No bowel obstruction.  PERITONEUM: No ascites.   VESSELS:  Within normal limits.  RETROPERITONEUM/LYMPH NODES: No lymphadenopathy.     ABDOMINAL WALL: Within normal limits.  BONES: Within normal limits.     IMPRESSION: Indeterminate 16 cm left upperquadrant complex cystic mass likely arising from the pancreatic tail. 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    Right lower lobe calcified pleural nodule.        TATE TAPIA M.D., ATTENDING RADIOLOGIST  This document has been electronically signed. Apr 8 2020 11:12AM

## 2020-04-16 NOTE — PROGRESS NOTE ADULT - SUBJECTIVE AND OBJECTIVE BOX
CC: F/U for Bacteremia    Saw/spoke to patient. Patient drowsy but otherwise unchanged.    Allergies  No Known Allergies    ANTIMICROBIALS:  Off    PE:    Vital Signs Last 24 Hrs  T(C): 37.2 (16 Apr 2020 05:42), Max: 37.2 (15 Apr 2020 17:01)  T(F): 99 (16 Apr 2020 05:42), Max: 99 (16 Apr 2020 05:42)  HR: 99 (16 Apr 2020 05:42) (99 - 101)  BP: 132/60 (16 Apr 2020 05:42) (123/59 - 132/60)  RR: 16 (16 Apr 2020 05:42) (16 - 18)  SpO2: 96% (16 Apr 2020 05:42) (96% - 100%)    Gen: AOx3, NAD, non-toxic  CV: S1+S2 normal, nontachycardic  Resp: Clear bilat, no resp distress, no crackles/wheezes  Abd: Soft, nontender, +BS  Ext: No LE edema, no wounds  Lines: L IJ CVC    LABS:                        7.5    12.97 )-----------( 163      ( 15 Apr 2020 06:20 )             25.7     04-16    141  |  100  |  79<H>  ----------------------------<  202<H>  5.7<H>   |  25  |  3.34<H>    Ca    8.5      16 Apr 2020 07:16  Mg     2.2     04-16    TPro  5.6<L>  /  Alb  2.1<L>  /  TBili  0.2  /  DBili  x   /  AST  10  /  ALT  18  /  AlkPhos  164<H>  04-16    MICROBIOLOGY:    .Blood Blood-Peripheral aerobic  04-12-20   No growth to date.    .Blood Blood  04-08-20   Growth in aerobic bottle: Escherichia coli  See previous culture [id number]  --    Growth in aerobic bottle: Gram Negative Rods    (otherwise reviewed)    RADIOLOGY:    4/10 XR:     FINDINGS:      Technically very limited study. Made and upper abdomen is only partially included within the field-of-view. No abnormal bowel distention.      IMPRESSION:     Very limited study demonstrates no abnormal bowel distention.

## 2020-04-16 NOTE — CONSULT NOTE ADULT - SUBJECTIVE AND OBJECTIVE BOX
Chief Complaint:  Patient is a 74y old  Female who presents with a chief complaint of LE swelling (2020 13:21)      HPI:  75 yo F w/ PMH HLD, HTN, insulin dependent T2DM, RA (on chronic prednisone, leflunomide, and hydroxychloroquine) c/b bronchiectasis (on home O2 4 L NC) who p/w worsening LE swelling Leukocytosis, COVID found to be in renal dailure requiring temporary HD now urinating.  Patient also found to have Ecoli bacteremia.  Patient Had CT scan for work up of Ecoli bacteremia (treatment until ) on  which showed indeterminate 16 cm left upper quadrant complex cystic mass likely arising from the pancreatic tail. A cystic neoplasm is favored.Right lower lobe calcified pleural nodule.      Allergies:  No Known Allergies      Home Medications:    Hospital Medications:  acetaminophen   Tablet .. 650 milliGRAM(s) Oral every 6 hours PRN  apixaban 5 milliGRAM(s) Oral two times a day  dextrose 40% Gel 15 Gram(s) Oral once PRN  dextrose 40% Gel 15 Gram(s) Oral once PRN  dextrose 5%. 1000 milliLiter(s) IV Continuous <Continuous>  dextrose 50% Injectable 12.5 Gram(s) IV Push once  dextrose 50% Injectable 12.5 Gram(s) IV Push once  glucagon  Injectable 1 milliGRAM(s) IntraMuscular once PRN  HYDROmorphone  Injectable 0.5 milliGRAM(s) IV Push every 8 hours PRN  insulin glargine Injectable (LANTUS) 5 Unit(s) SubCutaneous at bedtime  insulin lispro (HumaLOG) corrective regimen sliding scale   SubCutaneous Before meals and at bedtime  metoprolol tartrate 12.5 milliGRAM(s) Oral two times a day  midodrine. 10 milliGRAM(s) Oral <User Schedule> PRN  ondansetron Injectable 4 milliGRAM(s) IV Push every 8 hours PRN  predniSONE   Tablet 15 milliGRAM(s) Oral daily  silver sulfADIAZINE 1% Cream 1 Application(s) Topical daily  simethicone 80 milliGRAM(s) Chew two times a day PRN  sodium zirconium cyclosilicate 10 Gram(s) Oral once  sodium zirconium cyclosilicate 10 Gram(s) Oral once      PMHX/PSHX:  Hyperlipidemia  Bronchiectasis  Type 2 diabetes mellitus  Hypertension  Rheumatoid arthritis  No significant past surgical history      Family history:  No pertinent family history in first degree relatives      Social History:     ROS:     General:  No wt loss, fevers, chills, night sweats, fatigue,   Eyes:  Good vision, no reported pain  ENT:  No sore throat, pain, runny nose, dysphagia  CV:  No pain, palpitations, hypo/hypertension  Resp:  No dyspnea, cough, tachypnea, wheezing  GI:  See HPI  :  No pain, bleeding, incontinence, nocturia  Muscle:  No pain, weakness  Neuro:  No weakness, tingling, memory problems  Psych:  No fatigue, insomnia, mood problems, depression  Endocrine:  No polyuria, polydipsia, cold/heat intolerance  Heme:  No petechiae, ecchymosis, easy bruisability  Skin:  No rash, edema      PHYSICAL EXAM:     GENERAL:  NAD  CHEST:  Full & symmetric excursion  HEART:  Regular rhythm, no abdominal bruit, no edema  ABDOMEN:  Soft, non-tender, non-distended, normoactive bowel sounds,  no masses , no hepatosplenomegaly  EXTREMITIES:  no cyanosis,clubbing or edema  SKIN:  No rash/erythema/ecchymoses/petechiae/wounds/abscess/warm/dry  NEURO:  Alert, oriented    Vital Signs:  Vital Signs Last 24 Hrs  T(C): 37.2 (2020 05:42), Max: 37.2 (15 Apr 2020 17:01)  T(F): 99 (2020 05:42), Max: 99 (2020 05:42)  HR: 99 (2020 05:42) (99 - 101)  BP: 132/60 (2020 05:42) (123/59 - 132/60)  BP(mean): --  RR: 16 (2020 05:42) (16 - 18)  SpO2: 96% (2020 05:42) (96% - 100%)  Daily     Daily Weight in k (2020 05:42)    LABS:                        7.5    12.97 )-----------( 163      ( 15 Apr 2020 06:20 )             25.7     04-16    141  |  100  |  79<H>  ----------------------------<  202<H>  5.7<H>   |  25  |  3.34<H>    Ca    8.5      2020 07:16  Mg     2.2     -    TPro  5.6<L>  /  Alb  2.1<L>  /  TBili  0.2  /  DBili  x   /  AST  10  /  ALT  18  /  AlkPhos  164<H>      LIVER FUNCTIONS - ( 2020 07:16 )  Alb: 2.1 g/dL / Pro: 5.6 g/dL / ALK PHOS: 164 u/L / ALT: 18 u/L / AST: 10 u/L / GGT: x                   Imaging: Chief Complaint:  Patient is a 74y old  Female who presents with a chief complaint of LE swelling (2020 13:21)      HPI:  73 yo F w/ PMH HLD, HTN, insulin dependent T2DM, RA (on chronic prednisone, leflunomide, and hydroxychloroquine) c/b bronchiectasis (on home O2 4 L NC) who p/w worsening LE swelling Leukocytosis, COVID rule out found to be in renal dailure requiring temporary HD now urinating.  Patient also found to have Ecoli bacteremia.  Patient Had CT scan for work up of Ecoli bacteremia (treatment until ) on  which showed indeterminate 16 cm left upper quadrant complex cystic mass likely arising from the pancreatic tail. A cystic neoplasm is favored.Right lower lobe calcified pleural nodule.      Allergies:  No Known Allergies      Home Medications:    Hospital Medications:  acetaminophen   Tablet .. 650 milliGRAM(s) Oral every 6 hours PRN  apixaban 5 milliGRAM(s) Oral two times a day  dextrose 40% Gel 15 Gram(s) Oral once PRN  dextrose 40% Gel 15 Gram(s) Oral once PRN  dextrose 5%. 1000 milliLiter(s) IV Continuous <Continuous>  dextrose 50% Injectable 12.5 Gram(s) IV Push once  dextrose 50% Injectable 12.5 Gram(s) IV Push once  glucagon  Injectable 1 milliGRAM(s) IntraMuscular once PRN  HYDROmorphone  Injectable 0.5 milliGRAM(s) IV Push every 8 hours PRN  insulin glargine Injectable (LANTUS) 5 Unit(s) SubCutaneous at bedtime  insulin lispro (HumaLOG) corrective regimen sliding scale   SubCutaneous Before meals and at bedtime  metoprolol tartrate 12.5 milliGRAM(s) Oral two times a day  midodrine. 10 milliGRAM(s) Oral <User Schedule> PRN  ondansetron Injectable 4 milliGRAM(s) IV Push every 8 hours PRN  predniSONE   Tablet 15 milliGRAM(s) Oral daily  silver sulfADIAZINE 1% Cream 1 Application(s) Topical daily  simethicone 80 milliGRAM(s) Chew two times a day PRN  sodium zirconium cyclosilicate 10 Gram(s) Oral once  sodium zirconium cyclosilicate 10 Gram(s) Oral once      PMHX/PSHX:  Hyperlipidemia  Bronchiectasis  Type 2 diabetes mellitus  Hypertension  Rheumatoid arthritis  No significant past surgical history      Family history:  No pertinent family history in first degree relatives      Social History:     ROS:     General:  No wt loss, fevers, chills, night sweats, fatigue,   Eyes:  Good vision, no reported pain  ENT:  No sore throat, pain, runny nose, dysphagia  CV:  No pain, palpitations, hypo/hypertension  Resp:  No dyspnea, cough, tachypnea, wheezing  GI:  See HPI  :  No pain, bleeding, incontinence, nocturia  Muscle:  No pain, weakness  Neuro:  No weakness, tingling, memory problems  Psych:  No fatigue, insomnia, mood problems, depression  Endocrine:  No polyuria, polydipsia, cold/heat intolerance  Heme:  No petechiae, ecchymosis, easy bruisability  Skin:  No rash, edema      PHYSICAL EXAM:     GENERAL:  NAD  CHEST:  Full & symmetric excursion  HEART:  Regular rhythm, no abdominal bruit, no edema  ABDOMEN:  Soft, non-tender, non-distended, normoactive bowel sounds,  no masses , no hepatosplenomegaly  EXTREMITIES:  no cyanosis,clubbing or edema  SKIN:  No rash/erythema/ecchymoses/petechiae/wounds/abscess/warm/dry  NEURO:  Alert, oriented    Vital Signs:  Vital Signs Last 24 Hrs  T(C): 37.2 (2020 05:42), Max: 37.2 (15 Apr 2020 17:01)  T(F): 99 (2020 05:42), Max: 99 (2020 05:42)  HR: 99 (2020 05:42) (99 - 101)  BP: 132/60 (2020 05:42) (123/59 - 132/60)  BP(mean): --  RR: 16 (2020 05:42) (16 - 18)  SpO2: 96% (2020 05:42) (96% - 100%)  Daily     Daily Weight in k (2020 05:42)    LABS:                        7.5    12.97 )-----------( 163      ( 15 Apr 2020 06:20 )             25.7     04-16    141  |  100  |  79<H>  ----------------------------<  202<H>  5.7<H>   |  25  |  3.34<H>    Ca    8.5      2020 07:16  Mg     2.2     -    TPro  5.6<L>  /  Alb  2.1<L>  /  TBili  0.2  /  DBili  x   /  AST  10  /  ALT  18  /  AlkPhos  164<H>      LIVER FUNCTIONS - ( 2020 07:16 )  Alb: 2.1 g/dL / Pro: 5.6 g/dL / ALK PHOS: 164 u/L / ALT: 18 u/L / AST: 10 u/L / GGT: x                   Imaging:

## 2020-04-16 NOTE — PROGRESS NOTE ADULT - PROBLEM SELECTOR PLAN 1
seen by ID on Rocephin to complete on 4/24 but if dc prior can be discharged on oral Cipro. Mild elevation in WBC is likely secondary to chronic pred use and no further eval is needed

## 2020-04-16 NOTE — PROGRESS NOTE ADULT - SUBJECTIVE AND OBJECTIVE BOX
Medicine Progress Note    Patient is a 74y old  Female who presented initially with  with a chief complaint of LE swelling (16 Apr 2020 09:45)      SUBJECTIVE / OVERNIGHT EVENTS: no acute events overnight    ADDITIONAL REVIEW OF SYSTEMS: none    MEDICATIONS  (STANDING):  apixaban 5 milliGRAM(s) Oral two times a day  dextrose 5%. 1000 milliLiter(s) (50 mL/Hr) IV Continuous <Continuous>  dextrose 50% Injectable 12.5 Gram(s) IV Push once  dextrose 50% Injectable 12.5 Gram(s) IV Push once  insulin lispro (HumaLOG) corrective regimen sliding scale   SubCutaneous Before meals and at bedtime  metoprolol tartrate 12.5 milliGRAM(s) Oral two times a day  predniSONE   Tablet 15 milliGRAM(s) Oral daily  silver sulfADIAZINE 1% Cream 1 Application(s) Topical daily  sodium zirconium cyclosilicate 10 Gram(s) Oral once  sodium zirconium cyclosilicate 10 Gram(s) Oral once    MEDICATIONS  (PRN):  acetaminophen   Tablet .. 650 milliGRAM(s) Oral every 6 hours PRN Temp greater or equal to 38C (100.4F), Mild Pain (1 - 3), Moderate Pain (4 - 6)  dextrose 40% Gel 15 Gram(s) Oral once PRN Blood Glucose LESS THAN 70 milliGRAM(s)/deciliter  dextrose 40% Gel 15 Gram(s) Oral once PRN Blood Glucose LESS THAN 70 milliGRAM(s)/deciliter  glucagon  Injectable 1 milliGRAM(s) IntraMuscular once PRN Glucose LESS THAN 70 milligrams/deciliter  HYDROmorphone  Injectable 0.5 milliGRAM(s) IV Push every 8 hours PRN Severe Pain (7 - 10)  midodrine. 10 milliGRAM(s) Oral <User Schedule> PRN Pre-HD for Hypotension  ondansetron Injectable 4 milliGRAM(s) IV Push every 8 hours PRN Nausea and/or Vomiting  simethicone 80 milliGRAM(s) Chew two times a day PRN Upset Stomach    CAPILLARY BLOOD GLUCOSE      POCT Blood Glucose.: 244 mg/dL (16 Apr 2020 12:24)  POCT Blood Glucose.: 216 mg/dL (16 Apr 2020 08:49)  POCT Blood Glucose.: 210 mg/dL (15 Apr 2020 22:20)  POCT Blood Glucose.: 267 mg/dL (15 Apr 2020 17:02)    I&O's Summary    15 Apr 2020 07:01  -  16 Apr 2020 07:00  --------------------------------------------------------  IN: 250 mL / OUT: 600 mL / NET: -350 mL    16 Apr 2020 07:01  -  16 Apr 2020 13:22  --------------------------------------------------------  IN: 0 mL / OUT: 50 mL / NET: -50 mL        PHYSICAL EXAM:  Vital Signs Last 24 Hrs  T(C): 37.2 (16 Apr 2020 05:42), Max: 37.2 (15 Apr 2020 17:01)  T(F): 99 (16 Apr 2020 05:42), Max: 99 (16 Apr 2020 05:42)  HR: 99 (16 Apr 2020 05:42) (99 - 101)  BP: 132/60 (16 Apr 2020 05:42) (123/59 - 132/60)  RR: 16 (16 Apr 2020 05:42) (16 - 18)  SpO2: 96% (16 Apr 2020 05:42) (96% - 100%)  CONSTITUTIONAL: NAD, well-developed, well-groomed  ENMT: Moist oral mucosa, no pharyngeal injection or exudates; normal dentition  RESPIRATORY: Normal respiratory effort; lungs are clear to auscultation bilaterally  CARDIOVASCULAR: Regular rate and rhythm, normal S1 and S2, no murmur/rub/gallop; No lower extremity edema; Peripheral pulses are 2+ bilaterally  ABDOMEN: Nontender to palpation, normoactive bowel sounds, no rebound/guarding; No hepatosplenomegaly  PSYCH: A+O to person, place, ; affect slow  NEUROLOGY: CN 2-12 are intact and symmetric; no gross sensory deficits   SKIN: superficial hematoma, abrasions of skin consistent with long term prednisone use   LABS:                        7.5    12.97 )-----------( 163      ( 15 Apr 2020 06:20 )             25.7     04-16    141  |  100  |  79<H>  ----------------------------<  202<H>  5.7<H>   |  25  |  3.34<H>    Ca    8.5      16 Apr 2020 07:16  Mg     2.2     04-16    TPro  5.6<L>  /  Alb  2.1<L>  /  TBili  0.2  /  DBili  x   /  AST  10  /  ALT  18  /  AlkPhos  164<H>  04-16              COVID-19 PCR: NotDetec (05 Apr 2020 02:56)      RADIOLOGY & ADDITIONAL TESTS:  Imaging from Last 24 Hours: none         COORDINATION OF CARE:  Care Discussed with Consultants/Other Providers: ANTWAN

## 2020-04-16 NOTE — PROGRESS NOTE ADULT - PROBLEM SELECTOR PLAN 2
Pt. with likely NBA in the setting of infection, low BP, and decreased oral intake. Pt. with probable CKD in the setting of long-standing history of DM and HTN. Pt. with normal Scr of 0.96 on 6/7/16, no recent labs. On admission (4/4/20), Scr was elevated to 2.82, repeat today is 3.34 after Bumex but patient is non-oliguric now. Continue Bumex as above. Remove non-tunneled catheter (2 weeks old). If patient has worsening sCr or requires HD, will need new catheter. Monitor labs, monitor urine output.     If any questions, please feel free to contact me  Oliver Ricci   Nephrology Fellow  [phone]  (After 5 pm or on weekends please page the on-call fellow)

## 2020-04-16 NOTE — PROGRESS NOTE ADULT - ASSESSMENT
Patient is a 73 yo woma with HLD, HTN, insulin dependent T2DM, RA (on chronic prednisone, leflunomide, and hydroxychloroquine) c/b bronchiectasis (on home O2 4 L NC) who p/w worsening LE swelling and was noted to be in atrial fibrillation.    Elevated troponins, in setting of NBA on HD, type II MI  - In the setting sepsis (E coli bacteremia) and new pancreatic tail cystic lesion, Blood ctx 4/12/20 NGTD  - Low suspicion for ACS, given clinical context and with underlying NBA now on HD with negative CK  - TTE with grossly normal left ventricular systolic function  - Last stress test less than a year in Dr. Mays's office was reportedly unremarkable   - ECG NSR with non ST specific changes       Atrial fibrillation  - NAHOMI-VASc score 4   - Restarted on low dose metoprolol  - s/p blood transfusions. Restarted apixaban 5 daily.

## 2020-04-16 NOTE — CHART NOTE - NSCHARTNOTEFT_GEN_A_CORE
Spoke with nephrology Dr Ricci remove shiley, give 2 dose of lokelma 10mg 8hrs apart and start lokelma 10mg daily starting tomorrow. Spoke with phmaracy cannot have standing lokelma will order 5 days and reassess.      left IJ Shiley removed at bedside 4/16, tip intact. Pressure held for 15mis. No signs of bleeding. Will continue to monitor.

## 2020-04-16 NOTE — PROGRESS NOTE ADULT - ATTENDING COMMENTS
Acute kidney injury - review of chart done> unsure etiology of NBA but likely atn in the setting of hypotension along with volume overload.   she likely had some CKD even prior to coming in ( no labs since 2016) and admitted with a creat of 2.8/Oqm397/K-7.5- initiated dialysis.   urinalysis with moderate protein but no blood- likely chronic.     Bumex challenged yesterday with 600cc of urine output   Give another dose of Bumex 2 mg iv today   the patient has expressed that she would not like to be on long term dialysis- hopefully we can avert dialysis. her vascath has been present for 2 weeks- PLEASE D/C VASCATH today. If at all we establish that she needs dialysis, she will need Permcath.

## 2020-04-16 NOTE — PROGRESS NOTE ADULT - ASSESSMENT
74 year old patient with a history of multiple medical co-morbidities including copd on home o2( 4L) , HFpEF, DM, HLD, RA( on chronic pred) presented with peripheral edema, and found to be in RF requiring temporary HD, now urinating on her own , Shiley pulled per renal .

## 2020-04-16 NOTE — CONSULT NOTE ADULT - ASSESSMENT
Impression:  1) Large Pancreatic cyst - concerning for cystic neoplasm found on CT 4/08   2) NBA  3) Ecoli bacteremia    Recommendation:   - daily labs needed for procedure planning including coags   - bacteremia management ID   - supportive care per primary Impression:  1) Large Pancreatic cyst - concerning for cystic neoplasm found on CT 4/08   2) NBA  3) Ecoli bacteremia    Recommendation:   - daily labs needed for procedure planning including coags   - bacteremia management ID   - likely EUS/FNA neck week once NBA and electrolytes stabilize   - supportive care per primary

## 2020-04-16 NOTE — PROGRESS NOTE ADULT - ASSESSMENT
73 yo F w/ PMH HLD, HTN, insulin dependent T2DM, RA (on chronic prednisone, leflunomide, and hydroxychloroquine) c/b bronchiectasis (on home O2 4 L NC) who p/w worsening LE swelling  Leukocytosis, no fever  E coli bacteremia, unclear source, S to CTX  COVID neg, CXR clear, no fevers--would not retest as we have alternate explanation for inflammatory response  CT A/P reassuring but with pancreatic lesion  Overall,  1) E coli bacteremia  - Ceftriaxone 1g q 24 through 4/24/20  - F/U BCXs for clearance  - Anticipate would complete treatment course with PO Cipro if DC planning  2) R/O COVID  - CXR clear, no fevers, COVID PCR neg, lower suspicion for COVID  3) Leukocytosis  - Unclear why persistent, reactive to pancreatic neoplasm?  - Trend to normal  4) LE wounds  - Wound care per primary team  5) Pancreatic lesion  - Care/workup per primary team    Oral Peacock MD  Pager [phone]  After 5pm and on weekends call [phone]

## 2020-04-17 DIAGNOSIS — G93.41 METABOLIC ENCEPHALOPATHY: ICD-10-CM

## 2020-04-17 LAB
ALBUMIN SERPL ELPH-MCNC: 2 G/DL — LOW (ref 3.3–5)
ALP SERPL-CCNC: 160 U/L — HIGH (ref 40–120)
ALT FLD-CCNC: 17 U/L — SIGNIFICANT CHANGE UP (ref 4–33)
AMMONIA BLD-MCNC: < 10 UMOL/L — LOW (ref 11–55)
ANION GAP SERPL CALC-SCNC: 16 MMO/L — HIGH (ref 7–14)
APTT BLD: 23.5 SEC — LOW (ref 27.5–36.3)
AST SERPL-CCNC: 10 U/L — SIGNIFICANT CHANGE UP (ref 4–32)
BASE EXCESS BLDA CALC-SCNC: 1.3 MMOL/L — SIGNIFICANT CHANGE UP
BASOPHILS # BLD AUTO: 0.1 K/UL — SIGNIFICANT CHANGE UP (ref 0–0.2)
BASOPHILS NFR BLD AUTO: 0.8 % — SIGNIFICANT CHANGE UP (ref 0–2)
BILIRUB SERPL-MCNC: 0.2 MG/DL — SIGNIFICANT CHANGE UP (ref 0.2–1.2)
BUN SERPL-MCNC: 88 MG/DL — HIGH (ref 7–23)
CALCIUM SERPL-MCNC: 8.6 MG/DL — SIGNIFICANT CHANGE UP (ref 8.4–10.5)
CHLORIDE SERPL-SCNC: 100 MMOL/L — SIGNIFICANT CHANGE UP (ref 98–107)
CO2 SERPL-SCNC: 25 MMOL/L — SIGNIFICANT CHANGE UP (ref 22–31)
CREAT SERPL-MCNC: 3.41 MG/DL — HIGH (ref 0.5–1.3)
CULTURE RESULTS: SIGNIFICANT CHANGE UP
CULTURE RESULTS: SIGNIFICANT CHANGE UP
EOSINOPHIL # BLD AUTO: 0.06 K/UL — SIGNIFICANT CHANGE UP (ref 0–0.5)
EOSINOPHIL NFR BLD AUTO: 0.5 % — SIGNIFICANT CHANGE UP (ref 0–6)
GLUCOSE BLDC GLUCOMTR-MCNC: 219 MG/DL — HIGH (ref 70–99)
GLUCOSE BLDC GLUCOMTR-MCNC: 259 MG/DL — HIGH (ref 70–99)
GLUCOSE BLDC GLUCOMTR-MCNC: 286 MG/DL — HIGH (ref 70–99)
GLUCOSE BLDC GLUCOMTR-MCNC: 290 MG/DL — HIGH (ref 70–99)
GLUCOSE SERPL-MCNC: 217 MG/DL — HIGH (ref 70–99)
HCO3 BLDA-SCNC: 25 MMOL/L — SIGNIFICANT CHANGE UP (ref 22–26)
HCT VFR BLD CALC: 27.5 % — LOW (ref 34.5–45)
HGB BLD-MCNC: 7.8 G/DL — LOW (ref 11.5–15.5)
IMM GRANULOCYTES NFR BLD AUTO: 8.3 % — HIGH (ref 0–1.5)
INR BLD: 1.31 — HIGH (ref 0.88–1.17)
LYMPHOCYTES # BLD AUTO: 0.57 K/UL — LOW (ref 1–3.3)
LYMPHOCYTES # BLD AUTO: 4.7 % — LOW (ref 13–44)
MAGNESIUM SERPL-MCNC: 2.2 MG/DL — SIGNIFICANT CHANGE UP (ref 1.6–2.6)
MCHC RBC-ENTMCNC: 28.4 % — LOW (ref 32–36)
MCHC RBC-ENTMCNC: 28.9 PG — SIGNIFICANT CHANGE UP (ref 27–34)
MCV RBC AUTO: 101.9 FL — HIGH (ref 80–100)
MONOCYTES # BLD AUTO: 0.43 K/UL — SIGNIFICANT CHANGE UP (ref 0–0.9)
MONOCYTES NFR BLD AUTO: 3.5 % — SIGNIFICANT CHANGE UP (ref 2–14)
NEUTROPHILS # BLD AUTO: 9.96 K/UL — HIGH (ref 1.8–7.4)
NEUTROPHILS NFR BLD AUTO: 82.2 % — HIGH (ref 43–77)
NRBC # FLD: 0.06 K/UL — SIGNIFICANT CHANGE UP (ref 0–0)
PCO2 BLDA: 66 MMHG — HIGH (ref 32–48)
PH BLDA: 7.25 PH — LOW (ref 7.35–7.45)
PLATELET # BLD AUTO: 185 K/UL — SIGNIFICANT CHANGE UP (ref 150–400)
PMV BLD: 11.6 FL — SIGNIFICANT CHANGE UP (ref 7–13)
PO2 BLDA: 175 MMHG — HIGH (ref 83–108)
POTASSIUM SERPL-MCNC: 5.7 MMOL/L — HIGH (ref 3.5–5.3)
POTASSIUM SERPL-SCNC: 5.7 MMOL/L — HIGH (ref 3.5–5.3)
PROT SERPL-MCNC: 5.7 G/DL — LOW (ref 6–8.3)
PROTHROM AB SERPL-ACNC: 15.1 SEC — HIGH (ref 9.8–13.1)
RBC # BLD: 2.7 M/UL — LOW (ref 3.8–5.2)
RBC # FLD: 16.7 % — HIGH (ref 10.3–14.5)
SAO2 % BLDA: 98.5 % — SIGNIFICANT CHANGE UP (ref 95–99)
SODIUM SERPL-SCNC: 141 MMOL/L — SIGNIFICANT CHANGE UP (ref 135–145)
SPECIMEN SOURCE: SIGNIFICANT CHANGE UP
SPECIMEN SOURCE: SIGNIFICANT CHANGE UP
WBC # BLD: 12.12 K/UL — HIGH (ref 3.8–10.5)
WBC # FLD AUTO: 12.12 K/UL — HIGH (ref 3.8–10.5)

## 2020-04-17 PROCEDURE — 70450 CT HEAD/BRAIN W/O DYE: CPT | Mod: 26

## 2020-04-17 PROCEDURE — 99232 SBSQ HOSP IP/OBS MODERATE 35: CPT

## 2020-04-17 PROCEDURE — 99232 SBSQ HOSP IP/OBS MODERATE 35: CPT | Mod: GC

## 2020-04-17 RX ORDER — CEFTRIAXONE 500 MG/1
1000 INJECTION, POWDER, FOR SOLUTION INTRAMUSCULAR; INTRAVENOUS EVERY 24 HOURS
Refills: 0 | Status: DISCONTINUED | OUTPATIENT
Start: 2020-04-17 | End: 2020-04-17

## 2020-04-17 RX ORDER — CEFTRIAXONE 500 MG/1
2000 INJECTION, POWDER, FOR SOLUTION INTRAMUSCULAR; INTRAVENOUS EVERY 24 HOURS
Refills: 0 | Status: DISCONTINUED | OUTPATIENT
Start: 2020-04-17 | End: 2020-04-20

## 2020-04-17 RX ADMIN — Medication 12.5 MILLIGRAM(S): at 18:09

## 2020-04-17 RX ADMIN — APIXABAN 5 MILLIGRAM(S): 2.5 TABLET, FILM COATED ORAL at 18:10

## 2020-04-17 RX ADMIN — Medication 3: at 18:10

## 2020-04-17 RX ADMIN — Medication 2: at 08:47

## 2020-04-17 RX ADMIN — Medication 3: at 22:42

## 2020-04-17 RX ADMIN — SODIUM ZIRCONIUM CYCLOSILICATE 10 GRAM(S): 10 POWDER, FOR SUSPENSION ORAL at 12:45

## 2020-04-17 RX ADMIN — Medication 1 APPLICATION(S): at 12:37

## 2020-04-17 RX ADMIN — INSULIN GLARGINE 5 UNIT(S): 100 INJECTION, SOLUTION SUBCUTANEOUS at 22:46

## 2020-04-17 RX ADMIN — APIXABAN 5 MILLIGRAM(S): 2.5 TABLET, FILM COATED ORAL at 06:08

## 2020-04-17 RX ADMIN — Medication 15 MILLIGRAM(S): at 06:07

## 2020-04-17 RX ADMIN — Medication 3: at 12:44

## 2020-04-17 RX ADMIN — Medication 650 MILLIGRAM(S): at 18:17

## 2020-04-17 RX ADMIN — CEFTRIAXONE 100 MILLIGRAM(S): 500 INJECTION, POWDER, FOR SOLUTION INTRAMUSCULAR; INTRAVENOUS at 12:47

## 2020-04-17 NOTE — PROGRESS NOTE ADULT - SUBJECTIVE AND OBJECTIVE BOX
[facility] DIVISION OF KIDNEY DISEASES AND HYPERTENSION -- FOLLOW UP NOTE  --------------------------------------------------------------------------------  HPI: 75 yo F with RA c/b chronic bronchiectasis, HTN, and DM2 is admitted with SOB. Nephrology team is consulted for NBA and hyperkalemia. On admission (4/4/20), Scr was elevated to 2.82, which as worsened to 3.31 on 4/5/20. Patient required urgent HD on 4/6/20. Last HD session was on 4/14/20. Patient had expressed previously she would not want HD long term. Given 2 doses of Bumex 4 mg IV with about 500mL urine output on 4/15 and 4/16.     Patient evaluated at bedside, lethargic and only tracking with eyes but not answering questions.     PAST HISTORY  --------------------------------------------------------------------------------  No significant changes to PMH, PSH, FHx, SHx, unless otherwise noted    ALLERGIES & MEDICATIONS  --------------------------------------------------------------------------------  Allergies    No Known Allergies    Intolerances    Standing Inpatient Medications  apixaban 5 milliGRAM(s) Oral two times a day  cefTRIAXone   IVPB 2000 milliGRAM(s) IV Intermittent every 24 hours  dextrose 5%. 1000 milliLiter(s) IV Continuous <Continuous>  dextrose 50% Injectable 12.5 Gram(s) IV Push once  dextrose 50% Injectable 12.5 Gram(s) IV Push once  insulin glargine Injectable (LANTUS) 5 Unit(s) SubCutaneous at bedtime  insulin lispro (HumaLOG) corrective regimen sliding scale   SubCutaneous Before meals and at bedtime  metoprolol tartrate 12.5 milliGRAM(s) Oral two times a day  predniSONE   Tablet 15 milliGRAM(s) Oral daily  silver sulfADIAZINE 1% Cream 1 Application(s) Topical daily  sodium zirconium cyclosilicate 10 Gram(s) Oral daily    REVIEW OF SYSTEMS  --------------------------------------------------------------------------------  Unable to obtain due to medical condition    All other systems were reviewed and are negative, except as noted.    VITALS/PHYSICAL EXAM  --------------------------------------------------------------------------------  T(C): 36.3 (04-17-20 @ 05:34), Max: 36.8 (04-16-20 @ 22:53)  HR: 102 (04-17-20 @ 05:34) (90 - 102)  BP: 108/50 (04-17-20 @ 05:34) (108/50 - 139/59)  RR: 17 (04-17-20 @ 05:34) (16 - 17)  SpO2: 99% (04-17-20 @ 05:34) (95% - 100%)  Wt(kg): --    04-16-20 @ 07:01  -  04-17-20 @ 07:00  --------------------------------------------------------  IN: 200 mL / OUT: 425 mL / NET: -225 mL    Physical Exam:  	Gen: lethargic, tracking with eyes but not responding, wincing to pain  	HEENT: supple neck  	Pulm: diminished bibasilar breath sounds  	CV: RRR, S1S2; no rub  	Abd: +BS, soft  	: + Cevallos catheter   	LE: (+) bilateral edema  	Skin: Right dorsal foot wound, wound on right forearm	    LABS/STUDIES  --------------------------------------------------------------------------------              7.8    12.12 >-----------<  185      [04-17-20 @ 05:54]              27.5     141  |  100  |  88  ----------------------------<  217      [04-17-20 @ 05:54]  5.7   |  25  |  3.41        Ca     8.6     [04-17-20 @ 05:54]      Mg     2.2     [04-17-20 @ 05:54]    TPro  5.7  /  Alb  2.0  /  TBili  0.2  /  DBili  x   /  AST  10  /  ALT  17  /  AlkPhos  160  [04-17-20 @ 05:54]    PT/INR: PT 15.1 , INR 1.31       [04-17-20 @ 05:54]  PTT: 23.5       [04-17-20 @ 05:54]    Creatinine Trend:  SCr 3.41 [04-17 @ 05:54]  SCr 3.37 [04-16 @ 21:19]  SCr 3.34 [04-16 @ 07:16]  SCr 2.84 [04-15 @ 06:20]  SCr 2.41 [04-14 @ 22:20]

## 2020-04-17 NOTE — PROVIDER CONTACT NOTE (CHANGE IN STATUS NOTIFICATION) - SITUATION
Patient appears lethargic throughout morning assessment. Prev RN reported patient being AxO4, patient received AxO2 and lethargic. Prev assessment documents patient as AxO4

## 2020-04-17 NOTE — PROGRESS NOTE ADULT - PROBLEM SELECTOR PLAN 2
stable  , on Rocephin, no evidence for other infections, other than possible skin and the Rocephin should cover

## 2020-04-17 NOTE — PROGRESS NOTE ADULT - PROBLEM SELECTOR PLAN 1
etiology is unclear, no evidence for overwhelming infection, especially since on Rocephin, stable wbc consistent with pred usage , will check Nh4 level, C03, and do a CT of the head to start. Will need to discuss end stage plans with committee/ethics

## 2020-04-17 NOTE — CHART NOTE - NSCHARTNOTEFT_GEN_A_CORE
Patient's ABG 7.25, Pco2 66, Po2 175 O2 Sat 99%.  Patient is retaining Co2 will start patient on Bipap.  Respiratory team notified.  Lila DIAZ

## 2020-04-17 NOTE — PROGRESS NOTE ADULT - ASSESSMENT
73 yo F w/ PMH HLD, HTN, insulin dependent T2DM, RA (on chronic prednisone, leflunomide, and hydroxychloroquine) c/b bronchiectasis (on home O2 4 L NC) who p/w worsening LE swelling  Leukocytosis, no fever  E coli bacteremia, unclear source, S to CTX  COVID neg, CXR clear, no fevers--would not retest as we have alternate explanation for inflammatory response  CT A/P reassuring but with pancreatic lesion  Overall,  1) E coli bacteremia  - Ceftriaxone 2g q 24 through 4/24/20  - F/U BCXs for clearance  - Anticipate would complete treatment course with PO Cipro if DC planning  2) R/O COVID  - CXR clear, no fevers, COVID PCR neg, lower suspicion for COVID  3) Leukocytosis  - Unclear why persistent, reactive to pancreatic neoplasm?  - Trend to normal  4) LE wounds  - Wound care per primary team  5) Pancreatic lesion  - Care/workup per primary team    Oral Peacock MD  Pager [phone]  After 5pm and on weekends call [phone]

## 2020-04-17 NOTE — PROGRESS NOTE ADULT - PROBLEM SELECTOR PLAN 2
Pt. with likely NBA in the setting of infection, low BP, and decreased oral intake. Pt. with probable CKD in the setting of long-standing history of DM and HTN. Pt. with normal Scr of 0.96 on 6/7/16, no recent labs. Scr stable today at 3.41 and patient non oliguric after Bumex 2 mg IV. Continue Bumex. Patient would not be a candidate for long term HD, will medically manage. Consider Palliative consult. Monitor labs, monitor urine output.     If any questions, please feel free to contact me  Oliver Ricci   Nephrology Fellow  [phone]  (After 5 pm or on weekends please page the on-call fellow)

## 2020-04-17 NOTE — PROGRESS NOTE ADULT - ASSESSMENT
Impression:  1) Large Pancreatic cyst - concerning for cystic neoplasm found on CT 4/08   2) NBA  3) Ecoli bacteremia    Recommendation:   - daily labs needed for procedure planning including coags   - bacteremia management ID   - likely EUS/FNA neck week once NBA and electrolytes stabilize   - supportive care per primary

## 2020-04-17 NOTE — PROGRESS NOTE ADULT - PROBLEM SELECTOR PLAN 4
stable, creat yesterday was 3.37 today 3.41, Shiley has been pulled, K = 5.7 from 6.1, renal dose not want to any further therapies at this time or evaluation

## 2020-04-17 NOTE — PROGRESS NOTE ADULT - PROBLEM SELECTOR PLAN 1
Pt. initially presented with severe hyperkalemia in the setting of NBA requiring urgent HD. Serum potassium today is 5.7. Started on Lokelma 10 mg daily. Low potassium diet. Monitor urine output.  Monitor serum potassium

## 2020-04-17 NOTE — PROGRESS NOTE ADULT - ATTENDING COMMENTS
Patient seen and examined with the GI fellow. I agree with the above assessment and plan. Thank you for allowing us to care for your patient.    Pt with large pancreas cystic lesion in setting of worsening NBA.     Will need EUS when optimized

## 2020-04-17 NOTE — PROGRESS NOTE ADULT - SUBJECTIVE AND OBJECTIVE BOX
Chief Complaint:  Patient is a 74y old  Female who presents with a chief complaint of LE swelling (16 Apr 2020 14:45)      Interval Events:   no events    Allergies:  No Known Allergies      Hospital Medications:  acetaminophen   Tablet .. 650 milliGRAM(s) Oral every 6 hours PRN  apixaban 5 milliGRAM(s) Oral two times a day  dextrose 40% Gel 15 Gram(s) Oral once PRN  dextrose 40% Gel 15 Gram(s) Oral once PRN  dextrose 5%. 1000 milliLiter(s) IV Continuous <Continuous>  dextrose 50% Injectable 12.5 Gram(s) IV Push once  dextrose 50% Injectable 12.5 Gram(s) IV Push once  glucagon  Injectable 1 milliGRAM(s) IntraMuscular once PRN  HYDROmorphone  Injectable 0.5 milliGRAM(s) IV Push every 8 hours PRN  insulin glargine Injectable (LANTUS) 5 Unit(s) SubCutaneous at bedtime  insulin lispro (HumaLOG) corrective regimen sliding scale   SubCutaneous Before meals and at bedtime  metoprolol tartrate 12.5 milliGRAM(s) Oral two times a day  midodrine. 10 milliGRAM(s) Oral <User Schedule> PRN  ondansetron Injectable 4 milliGRAM(s) IV Push every 8 hours PRN  predniSONE   Tablet 15 milliGRAM(s) Oral daily  silver sulfADIAZINE 1% Cream 1 Application(s) Topical daily  simethicone 80 milliGRAM(s) Chew two times a day PRN  sodium zirconium cyclosilicate 10 Gram(s) Oral daily      PMHX/PSHX:  Hyperlipidemia  Bronchiectasis  Type 2 diabetes mellitus  Hypertension  Rheumatoid arthritis  No significant past surgical history      Family history:  No pertinent family history in first degree relatives      ROS:  General: no night sweats, wt loss  CV: no pain, palpitation  Resp: no cough wheezing  Muscles: no weakness or pain  Endocrine: no polyuria, polydipsia, cold/heat intolerance  Heme: No petechia, ecchymosis    PHYSICAL EXAM:   n/a    Vital Signs:  Vital Signs Last 24 Hrs  T(C): 36.3 (17 Apr 2020 05:34), Max: 36.8 (16 Apr 2020 22:53)  T(F): 97.4 (17 Apr 2020 05:34), Max: 98.2 (16 Apr 2020 22:53)  HR: 102 (17 Apr 2020 05:34) (90 - 102)  BP: 108/50 (17 Apr 2020 05:34) (108/50 - 139/59)  BP(mean): --  RR: 17 (17 Apr 2020 05:34) (16 - 17)  SpO2: 99% (17 Apr 2020 05:34) (95% - 100%)  Daily     Daily     LABS:                        7.6    12.91 )-----------( 174      ( 16 Apr 2020 21:19 )             27.1     04-16    139  |  100  |  85<H>  ----------------------------<  293<H>  6.1<H>   |  24  |  3.37<H>    Ca    8.5      16 Apr 2020 21:19  Mg     2.2     04-16    TPro  5.6<L>  /  Alb  2.1<L>  /  TBili  0.2  /  DBili  x   /  AST  10  /  ALT  18  /  AlkPhos  164<H>  04-16    LIVER FUNCTIONS - ( 16 Apr 2020 07:16 )  Alb: 2.1 g/dL / Pro: 5.6 g/dL / ALK PHOS: 164 u/L / ALT: 18 u/L / AST: 10 u/L / GGT: x                   Imaging:

## 2020-04-17 NOTE — PROVIDER CONTACT NOTE (OTHER) - ACTION/TREATMENT ORDERED:
Telemetry PA made aware. Telemetry PA informed respiratory team to set up, hasn't set up yet. Will continue to monitor.

## 2020-04-17 NOTE — PROGRESS NOTE ADULT - ATTENDING COMMENTS
Acute kidney injury - exact etiology of NBA unknown but likely ATN in the setting of hypotension along with volume overload.   she likely had some CKD even prior to coming in ( no labs since 2016) and admitted with a creat of 2.8/Jwz139/K-7.5- initiated dialysis for severe hyperkalemia.   urinalysis with moderate protein but no blood- likely chronic.   Bumex challenged with improvement in urine output     today her mental status seemed much worse than before. she has also expressed multiple times that she would not like to be on long term dialysis   Vascath removed 4/16    - please get Blood gas ( her mental status appears to be worse)  - Bumex 2 mg iv BID   - Please address goals of care with family. the patient has not been doing well and has been gradually deteriorating.   - she is not a good long term dialysis candidate and she herself has expressed that she would not like to.

## 2020-04-17 NOTE — PROGRESS NOTE ADULT - ASSESSMENT
74 year old patient with a  new history of NBA of unknown etiol, with decreasing mental status, etiol unclear, and new pancreatic cystic lesion in need of further eval

## 2020-04-17 NOTE — PROGRESS NOTE ADULT - SUBJECTIVE AND OBJECTIVE BOX
Medicine Progress Note    Patient is a 74y old  Female who presents with a chief complaint of LE swelling (17 Apr 2020 06:27) found to be in NBA       SUBJECTIVE / OVERNIGHT EVENTS: more lethargic         MEDICATIONS  (STANDING):  apixaban 5 milliGRAM(s) Oral two times a day  cefTRIAXone   IVPB 2000 milliGRAM(s) IV Intermittent every 24 hours  dextrose 5%. 1000 milliLiter(s) (50 mL/Hr) IV Continuous <Continuous>  dextrose 50% Injectable 12.5 Gram(s) IV Push once  dextrose 50% Injectable 12.5 Gram(s) IV Push once  insulin glargine Injectable (LANTUS) 5 Unit(s) SubCutaneous at bedtime  insulin lispro (HumaLOG) corrective regimen sliding scale   SubCutaneous Before meals and at bedtime  metoprolol tartrate 12.5 milliGRAM(s) Oral two times a day  predniSONE   Tablet 15 milliGRAM(s) Oral daily  silver sulfADIAZINE 1% Cream 1 Application(s) Topical daily  sodium zirconium cyclosilicate 10 Gram(s) Oral daily    MEDICATIONS  (PRN):  acetaminophen   Tablet .. 650 milliGRAM(s) Oral every 6 hours PRN Temp greater or equal to 38C (100.4F), Mild Pain (1 - 3), Moderate Pain (4 - 6)  dextrose 40% Gel 15 Gram(s) Oral once PRN Blood Glucose LESS THAN 70 milliGRAM(s)/deciliter  dextrose 40% Gel 15 Gram(s) Oral once PRN Blood Glucose LESS THAN 70 milliGRAM(s)/deciliter  glucagon  Injectable 1 milliGRAM(s) IntraMuscular once PRN Glucose LESS THAN 70 milligrams/deciliter  HYDROmorphone  Injectable 0.5 milliGRAM(s) IV Push every 8 hours PRN Severe Pain (7 - 10)  midodrine. 10 milliGRAM(s) Oral <User Schedule> PRN Pre-HD for Hypotension  ondansetron Injectable 4 milliGRAM(s) IV Push every 8 hours PRN Nausea and/or Vomiting  simethicone 80 milliGRAM(s) Chew two times a day PRN Upset Stomach    CAPILLARY BLOOD GLUCOSE      POCT Blood Glucose.: 219 mg/dL (17 Apr 2020 08:31)  POCT Blood Glucose.: 254 mg/dL (16 Apr 2020 22:01)  POCT Blood Glucose.: 261 mg/dL (16 Apr 2020 21:16)  POCT Blood Glucose.: 305 mg/dL (16 Apr 2020 19:03)  POCT Blood Glucose.: 324 mg/dL (16 Apr 2020 16:54)  POCT Blood Glucose.: 309 mg/dL (16 Apr 2020 15:47)  POCT Blood Glucose.: 244 mg/dL (16 Apr 2020 12:24)    I&O's Summary    16 Apr 2020 07:01  -  17 Apr 2020 07:00  --------------------------------------------------------  IN: 200 mL / OUT: 425 mL / NET: -225 mL        PHYSICAL EXAM:  Vital Signs Last 24 Hrs  T(C): 36.3 (17 Apr 2020 05:34), Max: 36.8 (16 Apr 2020 22:53)  T(F): 97.4 (17 Apr 2020 05:34), Max: 98.2 (16 Apr 2020 22:53)  HR: 102 (17 Apr 2020 05:34) (90 - 102)  BP: 108/50 (17 Apr 2020 05:34) (108/50 - 139/59)  RR: 17 (17 Apr 2020 05:34) (16 - 17)  SpO2: 99% (17 Apr 2020 05:34) (95% - 100%)  CONSTITUTIONAL: NAD, well-developed, obese, sleeping , easily arousable however, does not answer questions except as yes or no  ENMT: Moist oral mucosa, no pharyngeal injection or exudates; normal dentition  RESPIRATORY: Normal respiratory effort; lungs are clear to auscultation bilaterally  CARDIOVASCULAR: Regular rate and rhythm, normal S1 and S2, no murmur/rub/gallop; +++ lower extremity edema;   ABDOMEN: Nontender to palpation, normoactive bowel sounds, no rebound/guarding; No hepatosplenomegaly  PSYCH: A+O to person,   NEUROLOGY: too sleepy to participate, no apparent focal deficits  SKIN: thinning skin secondary to her pred, with ulcers, wrapped however oozing yellow /green dc per RN     LABS:                        7.8    12.12 )-----------( 185      ( 17 Apr 2020 05:54 ) no sig change from 4/16             27.5     04-17    141  |  100  |  88<H>  ----------------------------<  217<H>   4/16: K = 6.1, creat = 3.37   5.7<H>   |  25  |  3.41<H>    Ca    8.6      17 Apr 2020 05:54  Mg     2.2     04-17    TPro  5.7<L>  /  Alb  2.0<L>  /  TBili  0.2  /  DBili  x   /  AST  10  /  ALT  17  /  AlkPhos  160<H>  04-17    PT/INR - ( 17 Apr 2020 05:54 )   PT: 15.1 SEC;   INR: 1.31          PTT - ( 17 Apr 2020 05:54 )  PTT:23.5 SEC          COVID-19 PCR: NotDetec (05 Apr 2020 02:56)      RADIOLOGY & ADDITIONAL TESTS:  Imaging from Last 24 Hours: none     Electrocardiogram/QTc Interval:    COORDINATION OF CARE:  Care Discussed with Consultants/Other Providers:

## 2020-04-17 NOTE — PROVIDER CONTACT NOTE (OTHER) - SITUATION
Patient ordered to start BiPAP due to ABG results, respiratory paged multiple times, respiratory manager contacted, no answer.

## 2020-04-17 NOTE — PROVIDER CONTACT NOTE (OTHER) - BACKGROUND
Pt. admitted for hyperkalemia; with HX of A-fib and s/p Internal Jugular cath removal. Patient has a pancreatic mass w/ EUS f/u

## 2020-04-18 LAB
ALBUMIN SERPL ELPH-MCNC: 1.9 G/DL — LOW (ref 3.3–5)
ALP SERPL-CCNC: 150 U/L — HIGH (ref 40–120)
ALT FLD-CCNC: 18 U/L — SIGNIFICANT CHANGE UP (ref 4–33)
ANION GAP SERPL CALC-SCNC: 17 MMO/L — HIGH (ref 7–14)
ANISOCYTOSIS BLD QL: SLIGHT — SIGNIFICANT CHANGE UP
AST SERPL-CCNC: 10 U/L — SIGNIFICANT CHANGE UP (ref 4–32)
BASE EXCESS BLDA CALC-SCNC: 1.4 MMOL/L — SIGNIFICANT CHANGE UP
BASO STIPL BLD QL SMEAR: PRESENT — SIGNIFICANT CHANGE UP
BASOPHILS # BLD AUTO: 0.04 K/UL — SIGNIFICANT CHANGE UP (ref 0–0.2)
BASOPHILS NFR BLD AUTO: 0.3 % — SIGNIFICANT CHANGE UP (ref 0–2)
BASOPHILS NFR SPEC: 0.8 % — SIGNIFICANT CHANGE UP (ref 0–2)
BILIRUB SERPL-MCNC: < 0.2 MG/DL — LOW (ref 0.2–1.2)
BLASTS # FLD: 0 % — SIGNIFICANT CHANGE UP (ref 0–0)
BUN SERPL-MCNC: 100 MG/DL — HIGH (ref 7–23)
CALCIUM SERPL-MCNC: 8.1 MG/DL — LOW (ref 8.4–10.5)
CHLORIDE SERPL-SCNC: 100 MMOL/L — SIGNIFICANT CHANGE UP (ref 98–107)
CO2 SERPL-SCNC: 25 MMOL/L — SIGNIFICANT CHANGE UP (ref 22–31)
CREAT SERPL-MCNC: 3.63 MG/DL — HIGH (ref 0.5–1.3)
EOSINOPHIL # BLD AUTO: 0.16 K/UL — SIGNIFICANT CHANGE UP (ref 0–0.5)
EOSINOPHIL NFR BLD AUTO: 1.4 % — SIGNIFICANT CHANGE UP (ref 0–6)
EOSINOPHIL NFR FLD: 2.6 % — SIGNIFICANT CHANGE UP (ref 0–6)
GIANT PLATELETS BLD QL SMEAR: PRESENT — SIGNIFICANT CHANGE UP
GLUCOSE BLDC GLUCOMTR-MCNC: 137 MG/DL — HIGH (ref 70–99)
GLUCOSE BLDC GLUCOMTR-MCNC: 202 MG/DL — HIGH (ref 70–99)
GLUCOSE BLDC GLUCOMTR-MCNC: 221 MG/DL — HIGH (ref 70–99)
GLUCOSE BLDC GLUCOMTR-MCNC: 229 MG/DL — HIGH (ref 70–99)
GLUCOSE SERPL-MCNC: 196 MG/DL — HIGH (ref 70–99)
HCO3 BLDA-SCNC: 25 MMOL/L — SIGNIFICANT CHANGE UP (ref 22–26)
HCT VFR BLD CALC: 27.5 % — LOW (ref 34.5–45)
HGB BLD-MCNC: 7.6 G/DL — LOW (ref 11.5–15.5)
IMM GRANULOCYTES NFR BLD AUTO: 8.2 % — HIGH (ref 0–1.5)
LYMPHOCYTES # BLD AUTO: 0.61 K/UL — LOW (ref 1–3.3)
LYMPHOCYTES # BLD AUTO: 5.3 % — LOW (ref 13–44)
LYMPHOCYTES NFR SPEC AUTO: 2.6 % — LOW (ref 13–44)
MACROCYTES BLD QL: SLIGHT — SIGNIFICANT CHANGE UP
MAGNESIUM SERPL-MCNC: 2.2 MG/DL — SIGNIFICANT CHANGE UP (ref 1.6–2.6)
MCHC RBC-ENTMCNC: 27.6 % — LOW (ref 32–36)
MCHC RBC-ENTMCNC: 27.7 PG — SIGNIFICANT CHANGE UP (ref 27–34)
MCV RBC AUTO: 100.4 FL — HIGH (ref 80–100)
METAMYELOCYTES # FLD: 0.9 % — SIGNIFICANT CHANGE UP (ref 0–1)
METHYLMALONATE SERPL-SCNC: 574 NMOL/L — HIGH (ref 0–378)
MONOCYTES # BLD AUTO: 0.55 K/UL — SIGNIFICANT CHANGE UP (ref 0–0.9)
MONOCYTES NFR BLD AUTO: 4.8 % — SIGNIFICANT CHANGE UP (ref 2–14)
MONOCYTES NFR BLD: 5.2 % — SIGNIFICANT CHANGE UP (ref 2–9)
MYELOCYTES NFR BLD: 0.9 % — HIGH (ref 0–0)
NEUTROPHIL AB SER-ACNC: 82.6 % — HIGH (ref 43–77)
NEUTROPHILS # BLD AUTO: 9.24 K/UL — HIGH (ref 1.8–7.4)
NEUTROPHILS NFR BLD AUTO: 80 % — HIGH (ref 43–77)
NEUTS BAND # BLD: 0 % — SIGNIFICANT CHANGE UP (ref 0–6)
NRBC # FLD: 0.06 K/UL — SIGNIFICANT CHANGE UP (ref 0–0)
OTHER - HEMATOLOGY %: 0.9 — SIGNIFICANT CHANGE UP
PCO2 BLDA: 62 MMHG — HIGH (ref 32–48)
PH BLDA: 7.27 PH — LOW (ref 7.35–7.45)
PHOSPHATE SERPL-MCNC: 7.2 MG/DL — HIGH (ref 2.5–4.5)
PLATELET # BLD AUTO: 189 K/UL — SIGNIFICANT CHANGE UP (ref 150–400)
PLATELET COUNT - ESTIMATE: NORMAL — SIGNIFICANT CHANGE UP
PMV BLD: 11.5 FL — SIGNIFICANT CHANGE UP (ref 7–13)
PO2 BLDA: 144 MMHG — HIGH (ref 83–108)
POLYCHROMASIA BLD QL SMEAR: SLIGHT — SIGNIFICANT CHANGE UP
POTASSIUM SERPL-MCNC: 5.4 MMOL/L — HIGH (ref 3.5–5.3)
POTASSIUM SERPL-SCNC: 5.4 MMOL/L — HIGH (ref 3.5–5.3)
PROMYELOCYTES # FLD: 0 % — SIGNIFICANT CHANGE UP (ref 0–0)
PROT SERPL-MCNC: 5.8 G/DL — LOW (ref 6–8.3)
RBC # BLD: 2.74 M/UL — LOW (ref 3.8–5.2)
RBC # FLD: 16.7 % — HIGH (ref 10.3–14.5)
SAO2 % BLDA: 98.4 % — SIGNIFICANT CHANGE UP (ref 95–99)
SODIUM SERPL-SCNC: 142 MMOL/L — SIGNIFICANT CHANGE UP (ref 135–145)
VARIANT LYMPHS # BLD: 3.5 % — SIGNIFICANT CHANGE UP
WBC # BLD: 11.55 K/UL — HIGH (ref 3.8–10.5)
WBC # FLD AUTO: 11.55 K/UL — HIGH (ref 3.8–10.5)

## 2020-04-18 PROCEDURE — 93010 ELECTROCARDIOGRAM REPORT: CPT

## 2020-04-18 PROCEDURE — 99233 SBSQ HOSP IP/OBS HIGH 50: CPT

## 2020-04-18 PROCEDURE — 99232 SBSQ HOSP IP/OBS MODERATE 35: CPT | Mod: CS

## 2020-04-18 RX ORDER — INSULIN GLARGINE 100 [IU]/ML
8 INJECTION, SOLUTION SUBCUTANEOUS AT BEDTIME
Refills: 0 | Status: DISCONTINUED | OUTPATIENT
Start: 2020-04-18 | End: 2020-04-24

## 2020-04-18 RX ORDER — BUMETANIDE 0.25 MG/ML
2 INJECTION INTRAMUSCULAR; INTRAVENOUS
Refills: 0 | Status: DISCONTINUED | OUTPATIENT
Start: 2020-04-18 | End: 2020-04-18

## 2020-04-18 RX ORDER — BUMETANIDE 0.25 MG/ML
2 INJECTION INTRAMUSCULAR; INTRAVENOUS
Refills: 0 | Status: DISCONTINUED | OUTPATIENT
Start: 2020-04-18 | End: 2020-04-20

## 2020-04-18 RX ADMIN — Medication 650 MILLIGRAM(S): at 18:09

## 2020-04-18 RX ADMIN — Medication 15 MILLIGRAM(S): at 05:14

## 2020-04-18 RX ADMIN — Medication 1 APPLICATION(S): at 13:06

## 2020-04-18 RX ADMIN — APIXABAN 5 MILLIGRAM(S): 2.5 TABLET, FILM COATED ORAL at 05:14

## 2020-04-18 RX ADMIN — Medication 12.5 MILLIGRAM(S): at 19:04

## 2020-04-18 RX ADMIN — INSULIN GLARGINE 8 UNIT(S): 100 INJECTION, SOLUTION SUBCUTANEOUS at 22:56

## 2020-04-18 RX ADMIN — Medication 2: at 13:03

## 2020-04-18 RX ADMIN — Medication 12.5 MILLIGRAM(S): at 05:15

## 2020-04-18 RX ADMIN — BUMETANIDE 2 MILLIGRAM(S): 0.25 INJECTION INTRAMUSCULAR; INTRAVENOUS at 19:04

## 2020-04-18 RX ADMIN — SODIUM ZIRCONIUM CYCLOSILICATE 10 GRAM(S): 10 POWDER, FOR SUSPENSION ORAL at 16:09

## 2020-04-18 RX ADMIN — Medication 2: at 09:10

## 2020-04-18 RX ADMIN — Medication 2: at 18:07

## 2020-04-18 RX ADMIN — APIXABAN 5 MILLIGRAM(S): 2.5 TABLET, FILM COATED ORAL at 19:05

## 2020-04-18 RX ADMIN — CEFTRIAXONE 100 MILLIGRAM(S): 500 INJECTION, POWDER, FOR SOLUTION INTRAMUSCULAR; INTRAVENOUS at 13:03

## 2020-04-18 NOTE — CONSULT NOTE ADULT - SUBJECTIVE AND OBJECTIVE BOX
CHIEF COMPLAINT:    HPI:    PAST MEDICAL & SURGICAL HISTORY:  Hyperlipidemia  Bronchiectasis  Type 2 diabetes mellitus  Hypertension  Rheumatoid arthritis  No significant past surgical history    FAMILY HISTORY:  No pertinent family history in first degree relatives    SOCIAL HISTORY:  Smoking:  Substance Use:   EtOH Use:  Marital Status: [ ] Single [ ]  [ ]  [ ]   Sexual History:   Occupation:  Recent Travel:  Country of Birth:  Advance Directives:    Allergies  No Known Allergies    HOME MEDICATIONS:  · 	benazepril 40 mg oral tablet: Last Dose Taken:  , 1 tab(s) orally 2 times a day  · 	hydrALAZINE 50 mg oral tablet: Last Dose Taken:  , 1 tab(s) orally 3 times a day  · 	amLODIPine 10 mg oral tablet: Last Dose Taken:  , 1 tab(s) orally once a day  · 	Lasix 40 mg oral tablet: Last Dose Taken:  , 1 tab(s) orally once a day  · 	pantoprazole 40 mg oral delayed release tablet: Last Dose Taken:  , 1 tab(s) orally once a day  · 	predniSONE 20 mg oral tablet: Last Dose Taken:  , 1 tab(s) orally once a day  · 	hydroxychloroquine 200 mg oral tablet: Last Dose Taken:  , 1 tab(s) orally 2 times a day  · 	Tresiba 100 units/mL subcutaneous solution: Last Dose Taken:  , 40 unit(s) subcutaneous once a day (at bedtime)  · 	NovoLOG 100 units/mL injectable solution: Last Dose Taken:  , 10-15 on sliding scale BID  · 	Vitamin D3 2000 intl units (50 mcg) oral tablet: Last Dose Taken:  , 1 tab(s) orally once a day  · 	Coenzyme Q10 200 mg oral capsule: Last Dose Taken:  , 1 cap(s) orally 2 times a day  · 	Centrum oral tablet: Last Dose Taken:  , 1 tab(s) orally once a day  · 	B Complex 50 oral tablet, extended release: Last Dose Taken:  , 1 tab(s) orally once a day  · 	Arava 20 mg oral tablet: Last Dose Taken:  , 1 tab(s) orally once a day  · 	Amaryl 2 mg oral tablet: Last Dose Taken:  , 1 tab(s) orally 2 times a day    REVIEW OF SYSTEMS:  Constitutional: [ ] negative [ ] fevers [ ] chills [ ] weight loss [ ] weight gain  HEENT: [ ] negative [ ] dry eyes [ ] eye irritation [ ] postnasal drip [ ] nasal congestion  CV: [ ] negative  [ ] chest pain [ ] orthopnea [ ] palpitations [ ] murmur  Resp: [ ] negative [ ] cough [ ] shortness of breath [ ] dyspnea [ ] wheezing [ ] sputum [ ] hemoptysis  GI: [ ] negative [ ] nausea [ ] vomiting [ ] diarrhea [ ] constipation [ ] abd pain [ ] dysphagia   : [ ] negative [ ] dysuria [ ] nocturia [ ] hematuria [ ] increased urinary frequency  Musculoskeletal: [ ] negative [ ] back pain [ ] myalgias [ ] arthralgias [ ] fracture  Skin: [ ] negative [ ] rash [ ] itch  Neurological: [ ] negative [ ] headache [ ] dizziness [ ] syncope [ ] weakness [ ] numbness  Psychiatric: [ ] negative [ ] anxiety [ ] depression  Endocrine: [ ] negative [ ] diabetes [ ] thyroid problem  Hematologic/Lymphatic: [ ] negative [ ] anemia [ ] bleeding problem  Allergic/Immunologic: [ ] negative [ ] itchy eyes [ ] nasal discharge [ ] hives [ ] angioedema  [ ] All other systems negative  [ ] Unable to assess ROS because ________    OBJECTIVE:  ICU Vital Signs Last 24 Hrs  T(C): 36.8 (18 Apr 2020 04:51), Max: 36.8 (18 Apr 2020 04:51)  T(F): 98.3 (18 Apr 2020 04:51), Max: 98.3 (18 Apr 2020 04:51)  HR: 89 (18 Apr 2020 15:01) (88 - 107)  BP: 114/57 (18 Apr 2020 04:51) (114/57 - 146/60)  BP(mean): --  ABP: --  ABP(mean): --  RR: 17 (18 Apr 2020 04:51) (17 - 17)  SpO2: 98% (18 Apr 2020 15:01) (98% - 100%)    04-17 @ 07:01  -  04-18 @ 07:00  --------------------------------------------------------  IN: 318 mL / OUT: 275 mL / NET: 43 mL      CAPILLARY BLOOD GLUCOSE      POCT Blood Glucose.: 229 mg/dL (18 Apr 2020 12:35)      PHYSICAL EXAM:  General:   HEENT:   Respiratory:   Cardiovascular:   Abdomen:   Extremities:   Skin:   Neurological:    HOSPITAL MEDICATIONS:  apixaban 5 milliGRAM(s) Oral two times a day    cefTRIAXone   IVPB 2000 milliGRAM(s) IV Intermittent every 24 hours    buMETAnide Injectable 2 milliGRAM(s) IV Push two times a day  metoprolol tartrate 12.5 milliGRAM(s) Oral two times a day  midodrine. 10 milliGRAM(s) Oral <User Schedule> PRN    dextrose 40% Gel 15 Gram(s) Oral once PRN  dextrose 40% Gel 15 Gram(s) Oral once PRN  dextrose 50% Injectable 12.5 Gram(s) IV Push once  dextrose 50% Injectable 12.5 Gram(s) IV Push once  glucagon  Injectable 1 milliGRAM(s) IntraMuscular once PRN  insulin glargine Injectable (LANTUS) 8 Unit(s) SubCutaneous at bedtime  insulin lispro (HumaLOG) corrective regimen sliding scale   SubCutaneous Before meals and at bedtime  predniSONE   Tablet 15 milliGRAM(s) Oral daily    acetaminophen   Tablet .. 650 milliGRAM(s) Oral every 6 hours PRN  HYDROmorphone  Injectable 0.5 milliGRAM(s) IV Push every 8 hours PRN  ondansetron Injectable 4 milliGRAM(s) IV Push every 8 hours PRN    simethicone 80 milliGRAM(s) Chew two times a day PRN  dextrose 5%. 1000 milliLiter(s) IV Continuous <Continuous>    silver sulfADIAZINE 1% Cream 1 Application(s) Topical daily  sodium zirconium cyclosilicate 10 Gram(s) Oral daily      LABS:                        7.6    11.55 )-----------( 189      ( 18 Apr 2020 05:45 )             27.5     Hgb Trend: 7.6<--, 7.8<--, 7.6<--, 7.5<--, 7.3<--  04-18    142  |  100  |  100<H>  ----------------------------<  196<H>  5.4<H>   |  25  |  3.63<H>    Ca    8.1<L>      18 Apr 2020 03:00  Phos  7.2     04-18  Mg     2.2     04-18    TPro  5.8<L>  /  Alb  1.9<L>  /  TBili  < 0.2<L>  /  DBili  x   /  AST  10  /  ALT  18  /  AlkPhos  150<H>  04-18    Creatinine Trend: 3.63<--, 3.41<--, 3.37<--, 3.34<--, 2.84<--, 2.41<--  PT/INR - ( 17 Apr 2020 05:54 )   PT: 15.1 SEC;   INR: 1.31     PTT - ( 17 Apr 2020 05:54 )  PTT:23.5 SEC    Arterial Blood Gas:  04-18 @ 13:05  7.27/62/144/25/98.4/1.4  ABG lactate: --  Arterial Blood Gas:  04-17 @ 12:30  7.25/66/175/25/98.5/1.3  ABG lactate: --    MICROBIOLOGY:   COVID-19 PCR: NotDetec: This test has been validated by [facility] to be accurate;  though it has not been FDA cleared/approved by the usual pathway.  As with all laboratory tests, results should be correlated with clinical  findings.  https://www.fda.gov/media/603799/download  https://www.fda.gov/media/019296/download (04.05.20 @ 02:56)    Culture - Blood (04.12.20 @ 18:23)    Specimen Source: .Blood Blood-Peripheral aerobic    Culture Results:   No Growth Final        RADIOLOGY:  < from: Xray Chest 1 View- PORTABLE-Urgent (04.05.20 @ 15:42) >    FINDINGS:  Lines and Tubes: A catheter via left-sided approach is in the SVC.  Lungs: Bilateral lower lung opacities.  Pleura: Bilateral pleural effusions, left greater than right.  Heart and Mediastinum: Not well evaluated.  Skeletal: Unremarkable.    IMPRESSION:  1.  Catheter in the SVC. CHIEF COMPLAINT: worsening LE swelling    HPI:  74F hx HLD, HTN, DM2, RA (chronic prednisone, leflunomide, and hydroxychloroquine) c/b bronchiectasis (on home O2 4 L NC) who p/w worsening LE swelling. Pt declined to speak with me. Symptoms started 2 weeks ago. Prednisone was increased to 20 mg but worsened her LE swelling. Had SOB, MCNEILL. Started on lasix 20mg qd with some improvement. She came to the ED for further evaluation. Afebrile at that time, and requiring 4-5L to maintain saturations, which is her baseline.     In the ED, vitals Tmax 98.8 HR 93-99 BP 99/40 -134/97 RR 20-28 SPO2 % on 4-5 L NC. Notable labs: WBC 16.61, k+ 7.5, creatinine 2.82. s/p     Pulmonary consulted for hypercapnic respiratory failure. She sees Dr. Delgado as an outpatient for her bronchiectasis.     PAST MEDICAL & SURGICAL HISTORY:  Hyperlipidemia  Bronchiectasis  Type 2 diabetes mellitus  Hypertension  Rheumatoid arthritis  No significant past surgical history    FAMILY HISTORY:  No pertinent family history in first degree relatives    SOCIAL HISTORY:  Smoking:  Substance Use:   EtOH Use:  Marital Status: [ ] Single [ ]  [ ]  [ ]   Sexual History:   Occupation:  Recent Travel:  Country of Birth:  Advance Directives:    Allergies  No Known Allergies    HOME MEDICATIONS:  · 	benazepril 40 mg oral tablet: Last Dose Taken:  , 1 tab(s) orally 2 times a day  · 	hydrALAZINE 50 mg oral tablet: Last Dose Taken:  , 1 tab(s) orally 3 times a day  · 	amLODIPine 10 mg oral tablet: Last Dose Taken:  , 1 tab(s) orally once a day  · 	Lasix 40 mg oral tablet: Last Dose Taken:  , 1 tab(s) orally once a day  · 	pantoprazole 40 mg oral delayed release tablet: Last Dose Taken:  , 1 tab(s) orally once a day  · 	predniSONE 20 mg oral tablet: Last Dose Taken:  , 1 tab(s) orally once a day  · 	hydroxychloroquine 200 mg oral tablet: Last Dose Taken:  , 1 tab(s) orally 2 times a day  · 	Tresiba 100 units/mL subcutaneous solution: Last Dose Taken:  , 40 unit(s) subcutaneous once a day (at bedtime)  · 	NovoLOG 100 units/mL injectable solution: Last Dose Taken:  , 10-15 on sliding scale BID  · 	Vitamin D3 2000 intl units (50 mcg) oral tablet: Last Dose Taken:  , 1 tab(s) orally once a day  · 	Coenzyme Q10 200 mg oral capsule: Last Dose Taken:  , 1 cap(s) orally 2 times a day  · 	Centrum oral tablet: Last Dose Taken:  , 1 tab(s) orally once a day  · 	B Complex 50 oral tablet, extended release: Last Dose Taken:  , 1 tab(s) orally once a day  · 	Arava 20 mg oral tablet: Last Dose Taken:  , 1 tab(s) orally once a day  · 	Amaryl 2 mg oral tablet: Last Dose Taken:  , 1 tab(s) orally 2 times a day    REVIEW OF SYSTEMS:  [x] Unable to assess ROS because pt declined to answer     OBJECTIVE:  ICU Vital Signs Last 24 Hrs  T(C): 36.8 (18 Apr 2020 04:51), Max: 36.8 (18 Apr 2020 04:51)  T(F): 98.3 (18 Apr 2020 04:51), Max: 98.3 (18 Apr 2020 04:51)  HR: 89 (18 Apr 2020 15:01) (88 - 107)  BP: 114/57 (18 Apr 2020 04:51) (114/57 - 146/60)  BP(mean): --  ABP: --  ABP(mean): --  RR: 17 (18 Apr 2020 04:51) (17 - 17)  SpO2: 98% (18 Apr 2020 15:01) (98% - 100%)    04-17 @ 07:01  -  04-18 @ 07:00  --------------------------------------------------------  IN: 318 mL / OUT: 275 mL / NET: 43 mL      CAPILLARY BLOOD GLUCOSE  POCT Blood Glucose.: 229 mg/dL (18 Apr 2020 12:35)    PHYSICAL EXAM:  General: NAD, irritable  HEENT: NCAT, moist mucosal membranes  Respiratory: pt declined  Cardiovascular: pt declined  Abdomen: pt declined  Extremities: anasarca   Skin: warm/dry, skin breakdown and blistering in R forearm  Neurological: answering questions appropriately    HOSPITAL MEDICATIONS:  apixaban 5 milliGRAM(s) Oral two times a day    cefTRIAXone   IVPB 2000 milliGRAM(s) IV Intermittent every 24 hours    buMETAnide Injectable 2 milliGRAM(s) IV Push two times a day  metoprolol tartrate 12.5 milliGRAM(s) Oral two times a day  midodrine. 10 milliGRAM(s) Oral <User Schedule> PRN    dextrose 40% Gel 15 Gram(s) Oral once PRN  dextrose 40% Gel 15 Gram(s) Oral once PRN  dextrose 50% Injectable 12.5 Gram(s) IV Push once  dextrose 50% Injectable 12.5 Gram(s) IV Push once  glucagon  Injectable 1 milliGRAM(s) IntraMuscular once PRN  insulin glargine Injectable (LANTUS) 8 Unit(s) SubCutaneous at bedtime  insulin lispro (HumaLOG) corrective regimen sliding scale   SubCutaneous Before meals and at bedtime  predniSONE   Tablet 15 milliGRAM(s) Oral daily    acetaminophen   Tablet .. 650 milliGRAM(s) Oral every 6 hours PRN  HYDROmorphone  Injectable 0.5 milliGRAM(s) IV Push every 8 hours PRN  ondansetron Injectable 4 milliGRAM(s) IV Push every 8 hours PRN    simethicone 80 milliGRAM(s) Chew two times a day PRN  dextrose 5%. 1000 milliLiter(s) IV Continuous <Continuous>    silver sulfADIAZINE 1% Cream 1 Application(s) Topical daily  sodium zirconium cyclosilicate 10 Gram(s) Oral daily      LABS:                        7.6    11.55 )-----------( 189      ( 18 Apr 2020 05:45 )             27.5     Hgb Trend: 7.6<--, 7.8<--, 7.6<--, 7.5<--, 7.3<--  04-18    142  |  100  |  100<H>  ----------------------------<  196<H>  5.4<H>   |  25  |  3.63<H>    Ca    8.1<L>      18 Apr 2020 03:00  Phos  7.2     04-18  Mg     2.2     04-18    TPro  5.8<L>  /  Alb  1.9<L>  /  TBili  < 0.2<L>  /  DBili  x   /  AST  10  /  ALT  18  /  AlkPhos  150<H>  04-18    Creatinine Trend: 3.63<--, 3.41<--, 3.37<--, 3.34<--, 2.84<--, 2.41<--  PT/INR - ( 17 Apr 2020 05:54 )   PT: 15.1 SEC;   INR: 1.31     PTT - ( 17 Apr 2020 05:54 )  PTT:23.5 SEC    Arterial Blood Gas:  04-18 @ 13:05  7.27/62/144/25/98.4/1.4  ABG lactate: --  Arterial Blood Gas:  04-17 @ 12:30  7.25/66/175/25/98.5/1.3  ABG lactate: --    MICROBIOLOGY:   COVID-19 PCR: NotDetec: This test has been validated by [facility] to be accurate;  though it has not been FDA cleared/approved by the usual pathway.  As with all laboratory tests, results should be correlated with clinical  findings.  https://www.fda.gov/media/335115/download  https://www.fda.gov/media/010285/download (04.05.20 @ 02:56)    Culture - Blood (04.12.20 @ 18:23)    Specimen Source: .Blood Blood-Peripheral aerobic    Culture Results:   No Growth Final        RADIOLOGY:  < from: Xray Chest 1 View- PORTABLE-Urgent (04.05.20 @ 15:42) >    FINDINGS:  Lines and Tubes: A catheter via left-sided approach is in the SVC.  Lungs: Bilateral lower lung opacities.  Pleura: Bilateral pleural effusions, left greater than right.  Heart and Mediastinum: Not well evaluated.  Skeletal: Unremarkable.    IMPRESSION:  1.  Catheter in the SVC. CHIEF COMPLAINT: worsening LE swelling    HPI:  74F hx HLD, HTN, DM2, RA (chronic prednisone, leflunomide, and hydroxychloroquine) c/b bronchiectasis (on home O2 4 L NC) who p/w worsening LE swelling. Pt declined to speak with me. Symptoms started 2 weeks ago. Prednisone was increased to 20 mg but worsened her LE swelling. Had SOB, MCNEILL. Started on lasix 20mg qd with some improvement. She came to the ED for further evaluation. Afebrile at that time, and requiring 4-5L to maintain saturations, which is her baseline. Pt was admitted to medical floors for further workup and treatment. During her hospital stay, she was diagnosed with NBA, requiring urgent HD, E. 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    Pulmonary consulted for hypercapnic respiratory failure. She sees Dr. Delgado as an outpatient. Has history of diastolic HF for which she was prescribed lasix and aldactone. Pt seen at bedside, and does not appear confused. She is awake and declining to answer questions or be examined.    PAST MEDICAL & SURGICAL HISTORY:  Hyperlipidemia  Bronchiectasis  Type 2 diabetes mellitus  Hypertension  Rheumatoid arthritis  No significant past surgical history    FAMILY HISTORY:  No pertinent family history in first degree relatives    SOCIAL HISTORY: declined to answer    Allergies  No Known Allergies    HOME MEDICATIONS:  · 	benazepril 40 mg oral tablet: Last Dose Taken:  , 1 tab(s) orally 2 times a day  · 	hydrALAZINE 50 mg oral tablet: Last Dose Taken:  , 1 tab(s) orally 3 times a day  · 	amLODIPine 10 mg oral tablet: Last Dose Taken:  , 1 tab(s) orally once a day  · 	Lasix 40 mg oral tablet: Last Dose Taken:  , 1 tab(s) orally once a day  · 	pantoprazole 40 mg oral delayed release tablet: Last Dose Taken:  , 1 tab(s) orally once a day  · 	predniSONE 20 mg oral tablet: Last Dose Taken:  , 1 tab(s) orally once a day  · 	hydroxychloroquine 200 mg oral tablet: Last Dose Taken:  , 1 tab(s) orally 2 times a day  · 	Tresiba 100 units/mL subcutaneous solution: Last Dose Taken:  , 40 unit(s) subcutaneous once a day (at bedtime)  · 	NovoLOG 100 units/mL injectable solution: Last Dose Taken:  , 10-15 on sliding scale BID  · 	Vitamin D3 2000 intl units (50 mcg) oral tablet: Last Dose Taken:  , 1 tab(s) orally once a day  · 	Coenzyme Q10 200 mg oral capsule: Last Dose Taken:  , 1 cap(s) orally 2 times a day  · 	Centrum oral tablet: Last Dose Taken:  , 1 tab(s) orally once a day  · 	B Complex 50 oral tablet, extended release: Last Dose Taken:  , 1 tab(s) orally once a day  · 	Arava 20 mg oral tablet: Last Dose Taken:  , 1 tab(s) orally once a day  · 	Amaryl 2 mg oral tablet: Last Dose Taken:  , 1 tab(s) orally 2 times a day    REVIEW OF SYSTEMS:  [x] Unable to assess ROS because pt declined to answer     OBJECTIVE:  ICU Vital Signs Last 24 Hrs  T(C): 36.8 (18 Apr 2020 04:51), Max: 36.8 (18 Apr 2020 04:51)  T(F): 98.3 (18 Apr 2020 04:51), Max: 98.3 (18 Apr 2020 04:51)  HR: 89 (18 Apr 2020 15:01) (88 - 107)  BP: 114/57 (18 Apr 2020 04:51) (114/57 - 146/60)  BP(mean): --  ABP: --  ABP(mean): --  RR: 17 (18 Apr 2020 04:51) (17 - 17)  SpO2: 98% (18 Apr 2020 15:01) (98% - 100%)    04-17 @ 07:01  -  04-18 @ 07:00  --------------------------------------------------------  IN: 318 mL / OUT: 275 mL / NET: 43 mL      CAPILLARY BLOOD GLUCOSE  POCT Blood Glucose.: 229 mg/dL (18 Apr 2020 12:35)    PHYSICAL EXAM:  General: NAD, irritable  HEENT: NCAT, moist mucosal membranes  Respiratory: pt declined  Cardiovascular: pt declined  Abdomen: pt declined  Extremities: anasarca   Skin: warm/dry, skin breakdown and blistering in R forearm  Neurological: answering questions appropriately    HOSPITAL MEDICATIONS:  apixaban 5 milliGRAM(s) Oral two times a day    cefTRIAXone   IVPB 2000 milliGRAM(s) IV Intermittent every 24 hours    buMETAnide Injectable 2 milliGRAM(s) IV Push two times a day  metoprolol tartrate 12.5 milliGRAM(s) Oral two times a day  midodrine. 10 milliGRAM(s) Oral <User Schedule> PRN    dextrose 40% Gel 15 Gram(s) Oral once PRN  dextrose 40% Gel 15 Gram(s) Oral once PRN  dextrose 50% Injectable 12.5 Gram(s) IV Push once  dextrose 50% Injectable 12.5 Gram(s) IV Push once  glucagon  Injectable 1 milliGRAM(s) IntraMuscular once PRN  insulin glargine Injectable (LANTUS) 8 Unit(s) SubCutaneous at bedtime  insulin lispro (HumaLOG) corrective regimen sliding scale   SubCutaneous Before meals and at bedtime  predniSONE   Tablet 15 milliGRAM(s) Oral daily    acetaminophen   Tablet .. 650 milliGRAM(s) Oral every 6 hours PRN  HYDROmorphone  Injectable 0.5 milliGRAM(s) IV Push every 8 hours PRN  ondansetron Injectable 4 milliGRAM(s) IV Push every 8 hours PRN    simethicone 80 milliGRAM(s) Chew two times a day PRN  dextrose 5%. 1000 milliLiter(s) IV Continuous <Continuous>    silver sulfADIAZINE 1% Cream 1 Application(s) Topical daily  sodium zirconium cyclosilicate 10 Gram(s) Oral daily      LABS:                        7.6    11.55 )-----------( 189      ( 18 Apr 2020 05:45 )             27.5     Hgb Trend: 7.6<--, 7.8<--, 7.6<--, 7.5<--, 7.3<--  04-18    142  |  100  |  100<H>  ----------------------------<  196<H>  5.4<H>   |  25  |  3.63<H>    Ca    8.1<L>      18 Apr 2020 03:00  Phos  7.2     04-18  Mg     2.2     04-18    TPro  5.8<L>  /  Alb  1.9<L>  /  TBili  < 0.2<L>  /  DBili  x   /  AST  10  /  ALT  18  /  AlkPhos  150<H>  04-18    Creatinine Trend: 3.63<--, 3.41<--, 3.37<--, 3.34<--, 2.84<--, 2.41<--  PT/INR - ( 17 Apr 2020 05:54 )   PT: 15.1 SEC;   INR: 1.31     PTT - ( 17 Apr 2020 05:54 )  PTT:23.5 SEC    Arterial Blood Gas:  04-18 @ 13:05  7.27/62/144/25/98.4/1.4  ABG lactate: --  Arterial Blood Gas:  04-17 @ 12:30  7.25/66/175/25/98.5/1.3  ABG lactate: --    MICROBIOLOGY:   COVID-19 PCR: NotDetec: This test has been validated by [facility] [employer] to be accurate;  though it has not been FDA cleared/approved by the usual pathway.  As with all laboratory tests, results should be correlated with clinical  findings.  https://www.fda.gov/media/736801/download  https://www.fda.gov/media/009698/download (04.05.20 @ 02:56)    Culture - Blood (04.12.20 @ 18:23)    Specimen Source: .Blood Blood-Peripheral aerobic    Culture Results:   No Growth Final    Culture - Blood (04.08.20 @ 17:18)    Gram Stain:   Growth in aerobic bottle: Gram Negative Rods    Specimen Source: .Blood Blood    Culture Results:   Growth in aerobic bottle: Escherichia coli  See previous culture [id number]        RADIOLOGY:  < from: Xray Chest 1 View- PORTABLE-Urgent (04.05.20 @ 15:42) >    FINDINGS:  Lines and Tubes: A catheter via left-sided approach is in the SVC.  Lungs: Bilateral lower lung opacities.  Pleura: Bilateral pleural effusions, left greater than right.  Heart and Mediastinum: Not well evaluated.  Skeletal: Unremarkable.    IMPRESSION:  1.  Catheter in the SVC.

## 2020-04-18 NOTE — PROGRESS NOTE ADULT - SUBJECTIVE AND OBJECTIVE BOX
Medicine Progress Note    Patient is a 74y old  Female who presented with a chief complaint of LE swelling (2020 11:57), now with persistent lethargy, no improvement on bipap/cpap       SUBJECTIVE / OVERNIGHT EVENTS:    ADDITIONAL REVIEW OF SYSTEMS:    MEDICATIONS  (STANDING):  apixaban 5 milliGRAM(s) Oral two times a day  cefTRIAXone   IVPB 2000 milliGRAM(s) IV Intermittent every 24 hours  dextrose 5%. 1000 milliLiter(s) (50 mL/Hr) IV Continuous <Continuous>  dextrose 50% Injectable 12.5 Gram(s) IV Push once  dextrose 50% Injectable 12.5 Gram(s) IV Push once  insulin glargine Injectable (LANTUS) 5 Unit(s) SubCutaneous at bedtime  insulin lispro (HumaLOG) corrective regimen sliding scale   SubCutaneous Before meals and at bedtime  metoprolol tartrate 12.5 milliGRAM(s) Oral two times a day  predniSONE   Tablet 15 milliGRAM(s) Oral daily  silver sulfADIAZINE 1% Cream 1 Application(s) Topical daily  sodium zirconium cyclosilicate 10 Gram(s) Oral daily    MEDICATIONS  (PRN):  acetaminophen   Tablet .. 650 milliGRAM(s) Oral every 6 hours PRN Temp greater or equal to 38C (100.4F), Mild Pain (1 - 3), Moderate Pain (4 - 6)  dextrose 40% Gel 15 Gram(s) Oral once PRN Blood Glucose LESS THAN 70 milliGRAM(s)/deciliter  dextrose 40% Gel 15 Gram(s) Oral once PRN Blood Glucose LESS THAN 70 milliGRAM(s)/deciliter  glucagon  Injectable 1 milliGRAM(s) IntraMuscular once PRN Glucose LESS THAN 70 milligrams/deciliter  HYDROmorphone  Injectable 0.5 milliGRAM(s) IV Push every 8 hours PRN Severe Pain (7 - 10)  midodrine. 10 milliGRAM(s) Oral <User Schedule> PRN Pre-HD for Hypotension  ondansetron Injectable 4 milliGRAM(s) IV Push every 8 hours PRN Nausea and/or Vomiting  simethicone 80 milliGRAM(s) Chew two times a day PRN Upset Stomach    CAPILLARY BLOOD GLUCOSE      POCT Blood Glucose.: 221 mg/dL (2020 09:01)  POCT Blood Glucose.: 286 mg/dL (2020 22:06)  POCT Blood Glucose.: 290 mg/dL (2020 17:45)  POCT Blood Glucose.: 259 mg/dL (2020 12:39)    I&O's Summary    2020 07:01  -  2020 07:00  --------------------------------------------------------  IN: 318 mL / OUT: 275 mL / NET: 43 mL    AB.25/66/175  ca corrects for alb   PHYSICAL EXAM:  Vital Signs Last 24 Hrs  T(C): 36.8 (2020 04:51), Max: 36.8 (2020 04:51)  T(F): 98.3 (2020 04:51), Max: 98.3 (2020 04:51)  HR: 88 (2020 06:52) (88 - 111)  BP: 114/57 (2020 04:51) (114/57 - 146/60)  BP(mean): --  RR: 17 (2020 04:51) (17 - 22)  SpO2: 98% (2020 06:52) (98% - 100%)  CONSTITUTIONAL: lethargic, opens eyes  to verbal stim only   RESPIRATORY: Normal respiratory effort;bilateral crackles at bases about 1/4 way up   CARDIOVASCULAR: Regular rate and rhythm, normal S1 and S2, no murmur/rub/gallop; No lower extremity edema; Peripheral pulses are 2+ bilaterally  ABDOMEN: Nontender to palpation, normoactive bowel sounds, no rebound/guarding; No hepatosplenomegaly  skin abrasions secondary and c/w long term pred use  LABS:                        7.6    11.55 )-----------( 189      ( 2020 05:45 )             27.5     04-18    142  |  100  |  100<H>  ----------------------------<  196<H>  5.4<H>   |  25  |  3.63<H> <---3.41    Ca    8.1<L>      2020 03:00( corrects for alb)   Phos  7.2     04-18  Mg     2.2     04-18    TPro  5.8<L>  /  Alb  1.9<L>  /  TBili  < 0.2<L>  /  DBili  x   /  AST  10  /  ALT  18  /  AlkPhos  150<H>  04-18    PT/INR - ( 2020 05:54 )   PT: 15.1 SEC;   INR: 1.31          PTT - ( 2020 05:54 )  PTT:23.5 SEC          COVID-19 PCR: NotDetec (2020 02:56)      RADIOLOGY & ADDITIONAL TESTS:  Imaging from Last 24 Hours:    Electrocardiogram/QTc Interval:    COORDINATION OF CARE:  Care Discussed with Consultants/Other Providers:

## 2020-04-18 NOTE — PROGRESS NOTE ADULT - PROBLEM SELECTOR PLAN 1
Etiology unclear, no improvement despite bipap/cpap, would repeat abg to see if C02 is improved,  no NH4 done would check, DM unlikely to be the etiology, nor her infection as her cultures are negative .

## 2020-04-18 NOTE — PROGRESS NOTE ADULT - PROBLEM SELECTOR PLAN 2
Secondary to bronchiectasis, would have Pulm see the patient to see if this is the etiology of her lethargy  or at least contributing to it .

## 2020-04-18 NOTE — PROVIDER CONTACT NOTE (OTHER) - ASSESSMENT
Denies chest pain, shortness of breath, palpitations or dizziness. VItals within normal limits except for HR.

## 2020-04-18 NOTE — PROGRESS NOTE ADULT - ASSESSMENT
74 year old patient with bronchiectasis, RA, ecoli sepsis, with increasing lethargy, not improved with cpap/bipap and etiol unclear.

## 2020-04-18 NOTE — CHART NOTE - NSCHARTNOTEFT_GEN_A_CORE
Informed by nurse that patient was tachycardic up to 130s. EKG done showed sinus tachycardia at 105 bpm. Patient hear rate on tele fluctuating between high 90s and one hundred teens. Patient received evening dose of metoprolol. Will continue to monitor. Informed by nurse that patient was tachycardic up to 130s. EKG done showed sinus tachycardia at 105 bpm. Patient heart rate on tele fluctuating between high 90s and one hundred teens. Patient received evening dose of metoprolol. Will continue to monitor. no

## 2020-04-18 NOTE — PROGRESS NOTE ADULT - PROBLEM SELECTOR PLAN 7
Renal follow up appreciated, for supportive care, not a candidate for HD and pt in the past expressively stated she was not interested in this.

## 2020-04-18 NOTE — CONSULT NOTE ADULT - ASSESSMENT
----------------------------------------  Keri Berry MD PGY-6  Pulmonary/Critical Care Fellow  Pager # [phone] (NS), [phone] ([facility]) - increase bilevel 18/8, FiO2 40%  - maintain O2 Sat > 90%      ----------------------------------------  Keri Berry MD PGY-6  Pulmonary/Critical Care Fellow  Pager # [phone] (NS), [phone] ([facility]) 74F hx HLD, HTN, DM2, RA (chronic prednisone, leflunomide, and hydroxychloroquine) c/b bronchiectasis (on home O2 4 L NC) who p/w worsening LE swelling. Pulmonary consulted for hypercapnic resp failure: 7.27/62/144/25, 98%. Recent PFTs with obstruction, hypercapnea likely secondary to underlying obstructive disease and made worse by fluid overload.   - increase bilevel 18/8, FiO2 40%  - maintain O2 Sat > 90%  - would address GOC with patient, consider palliative consult  - keep pt net negative overall  - please start LABA/ICS and LAMA for pt, albuterol prn    ----------------------------------------  Keri Berry MD PGY-6  Pulmonary/Critical Care Fellow  Pager # [phone] (NS), [phone] ([facility])

## 2020-04-18 NOTE — CONSULT NOTE ADULT - ATTENDING COMMENTS
Patient is seen and examined.   73 yo with RA (on steroids/plaquenil), bronchiectasis, admitted with Ecoli bacteremia. 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 Pulmonary consulted for hypercapnic respiratory failure.   On my evaluation, patient is awake, alert and conversant. She is refusing to use NIV because she finds it uncomfortable. Would recommend changing to nasal mask vs nasal pillows for comfort. NIV settings adjusted to 8/18 to improve ventilation. Please encourage NIV use as tolerated.
Patient with chronic hypoxic respiratory failure with history as above here with sob, likely due to ADHF +/- covid, along with ARF and hyperkalemia.  Patient currently hemodynamically stable, does not need pressors.  Recommend treatment with intermittent HD at this time, d/w renal.  Currently on baseline O2 requirement.  Patient does not require MICU level of care at this time.  Please re-consult as needed.
Patient seen and examined with the GI fellow. I agree with the above assessment and plan. Thank you for allowing us to care for your patient.    Pt with large pancreas cystic lesion. Likely EUS next week once creatine improves.
continue to medically manage her hyperkalemia and hypervolemia. and hyperglycemia   she is reluctant to start HD

## 2020-04-18 NOTE — CHART NOTE - NSCHARTNOTEFT_GEN_A_CORE
Patient on BIPAP for CO2 retention. Attempted ABG this AM but was unsuccessful. Discussed with respiratory who say they will try if they have time. If unable will have da provider attempt. Labs also could not be drawn as patient is hard stick. Will attempt to get with ABG.

## 2020-04-19 DIAGNOSIS — I48.91 UNSPECIFIED ATRIAL FIBRILLATION: ICD-10-CM

## 2020-04-19 LAB
ALBUMIN SERPL ELPH-MCNC: 2 G/DL — LOW (ref 3.3–5)
ALP SERPL-CCNC: 136 U/L — HIGH (ref 40–120)
ALT FLD-CCNC: 18 U/L — SIGNIFICANT CHANGE UP (ref 4–33)
ANION GAP SERPL CALC-SCNC: 16 MMO/L — HIGH (ref 7–14)
AST SERPL-CCNC: 10 U/L — SIGNIFICANT CHANGE UP (ref 4–32)
BASE EXCESS BLDA CALC-SCNC: 1.2 MMOL/L — SIGNIFICANT CHANGE UP
BASOPHILS # BLD AUTO: 0.06 K/UL — SIGNIFICANT CHANGE UP (ref 0–0.2)
BASOPHILS NFR BLD AUTO: 0.6 % — SIGNIFICANT CHANGE UP (ref 0–2)
BILIRUB SERPL-MCNC: < 0.2 MG/DL — LOW (ref 0.2–1.2)
BUN SERPL-MCNC: 103 MG/DL — HIGH (ref 7–23)
CALCIUM SERPL-MCNC: 8 MG/DL — LOW (ref 8.4–10.5)
CHLORIDE SERPL-SCNC: 100 MMOL/L — SIGNIFICANT CHANGE UP (ref 98–107)
CO2 SERPL-SCNC: 25 MMOL/L — SIGNIFICANT CHANGE UP (ref 22–31)
CREAT SERPL-MCNC: 3.75 MG/DL — HIGH (ref 0.5–1.3)
EOSINOPHIL # BLD AUTO: 0.18 K/UL — SIGNIFICANT CHANGE UP (ref 0–0.5)
EOSINOPHIL NFR BLD AUTO: 1.7 % — SIGNIFICANT CHANGE UP (ref 0–6)
GLUCOSE BLDC GLUCOMTR-MCNC: 150 MG/DL — HIGH (ref 70–99)
GLUCOSE BLDC GLUCOMTR-MCNC: 189 MG/DL — HIGH (ref 70–99)
GLUCOSE BLDC GLUCOMTR-MCNC: 199 MG/DL — HIGH (ref 70–99)
GLUCOSE BLDC GLUCOMTR-MCNC: 203 MG/DL — HIGH (ref 70–99)
GLUCOSE SERPL-MCNC: 174 MG/DL — HIGH (ref 70–99)
HCO3 BLDA-SCNC: 25 MMOL/L — SIGNIFICANT CHANGE UP (ref 22–26)
HCT VFR BLD CALC: 26.8 % — LOW (ref 34.5–45)
HGB BLD-MCNC: 7.7 G/DL — LOW (ref 11.5–15.5)
IMM GRANULOCYTES NFR BLD AUTO: 7.3 % — HIGH (ref 0–1.5)
LYMPHOCYTES # BLD AUTO: 0.74 K/UL — LOW (ref 1–3.3)
LYMPHOCYTES # BLD AUTO: 7 % — LOW (ref 13–44)
MAGNESIUM SERPL-MCNC: 2 MG/DL — SIGNIFICANT CHANGE UP (ref 1.6–2.6)
MCHC RBC-ENTMCNC: 28.3 PG — SIGNIFICANT CHANGE UP (ref 27–34)
MCHC RBC-ENTMCNC: 28.7 % — LOW (ref 32–36)
MCV RBC AUTO: 98.5 FL — SIGNIFICANT CHANGE UP (ref 80–100)
MONOCYTES # BLD AUTO: 0.52 K/UL — SIGNIFICANT CHANGE UP (ref 0–0.9)
MONOCYTES NFR BLD AUTO: 4.9 % — SIGNIFICANT CHANGE UP (ref 2–14)
NEUTROPHILS # BLD AUTO: 8.35 K/UL — HIGH (ref 1.8–7.4)
NEUTROPHILS NFR BLD AUTO: 78.5 % — HIGH (ref 43–77)
NRBC # FLD: 0.11 K/UL — SIGNIFICANT CHANGE UP (ref 0–0)
NRBC FLD-RTO: 1 — SIGNIFICANT CHANGE UP
PCO2 BLDA: 53 MMHG — HIGH (ref 32–48)
PH BLDA: 7.32 PH — LOW (ref 7.35–7.45)
PHOSPHATE SERPL-MCNC: 6.4 MG/DL — HIGH (ref 2.5–4.5)
PLATELET # BLD AUTO: 210 K/UL — SIGNIFICANT CHANGE UP (ref 150–400)
PMV BLD: 11.9 FL — SIGNIFICANT CHANGE UP (ref 7–13)
PO2 BLDA: 161 MMHG — HIGH (ref 83–108)
POTASSIUM SERPL-MCNC: 4.6 MMOL/L — SIGNIFICANT CHANGE UP (ref 3.5–5.3)
POTASSIUM SERPL-SCNC: 4.6 MMOL/L — SIGNIFICANT CHANGE UP (ref 3.5–5.3)
PROT SERPL-MCNC: 5.7 G/DL — LOW (ref 6–8.3)
RBC # BLD: 2.72 M/UL — LOW (ref 3.8–5.2)
RBC # FLD: 16.8 % — HIGH (ref 10.3–14.5)
SAO2 % BLDA: 96.8 % — SIGNIFICANT CHANGE UP (ref 95–99)
SODIUM SERPL-SCNC: 141 MMOL/L — SIGNIFICANT CHANGE UP (ref 135–145)
WBC # BLD: 10.63 K/UL — HIGH (ref 3.8–10.5)
WBC # FLD AUTO: 10.63 K/UL — HIGH (ref 3.8–10.5)

## 2020-04-19 PROCEDURE — 99232 SBSQ HOSP IP/OBS MODERATE 35: CPT | Mod: CS

## 2020-04-19 PROCEDURE — 99233 SBSQ HOSP IP/OBS HIGH 50: CPT | Mod: GC

## 2020-04-19 PROCEDURE — 93010 ELECTROCARDIOGRAM REPORT: CPT

## 2020-04-19 RX ORDER — ALBUTEROL 90 UG/1
2 AEROSOL, METERED ORAL EVERY 6 HOURS
Refills: 0 | Status: DISCONTINUED | OUTPATIENT
Start: 2020-04-19 | End: 2020-04-24

## 2020-04-19 RX ORDER — FOLIC ACID 0.8 MG
1 TABLET ORAL DAILY
Refills: 0 | Status: DISCONTINUED | OUTPATIENT
Start: 2020-04-19 | End: 2020-04-24

## 2020-04-19 RX ORDER — METOPROLOL TARTRATE 50 MG
5 TABLET ORAL ONCE
Refills: 0 | Status: COMPLETED | OUTPATIENT
Start: 2020-04-19 | End: 2020-04-19

## 2020-04-19 RX ORDER — BUDESONIDE AND FORMOTEROL FUMARATE DIHYDRATE 160; 4.5 UG/1; UG/1
2 AEROSOL RESPIRATORY (INHALATION)
Refills: 0 | Status: DISCONTINUED | OUTPATIENT
Start: 2020-04-19 | End: 2020-04-24

## 2020-04-19 RX ORDER — METOPROLOL TARTRATE 50 MG
2.5 TABLET ORAL ONCE
Refills: 0 | Status: COMPLETED | OUTPATIENT
Start: 2020-04-19 | End: 2020-04-19

## 2020-04-19 RX ORDER — METOPROLOL TARTRATE 50 MG
25 TABLET ORAL
Refills: 0 | Status: DISCONTINUED | OUTPATIENT
Start: 2020-04-19 | End: 2020-04-23

## 2020-04-19 RX ADMIN — Medication 25 MILLIGRAM(S): at 17:07

## 2020-04-19 RX ADMIN — Medication 2.5 MILLIGRAM(S): at 04:25

## 2020-04-19 RX ADMIN — Medication 12.5 MILLIGRAM(S): at 06:34

## 2020-04-19 RX ADMIN — BUMETANIDE 2 MILLIGRAM(S): 0.25 INJECTION INTRAMUSCULAR; INTRAVENOUS at 06:29

## 2020-04-19 RX ADMIN — CEFTRIAXONE 100 MILLIGRAM(S): 500 INJECTION, POWDER, FOR SOLUTION INTRAMUSCULAR; INTRAVENOUS at 10:39

## 2020-04-19 RX ADMIN — Medication 5 MILLIGRAM(S): at 10:39

## 2020-04-19 RX ADMIN — APIXABAN 5 MILLIGRAM(S): 2.5 TABLET, FILM COATED ORAL at 17:07

## 2020-04-19 RX ADMIN — APIXABAN 5 MILLIGRAM(S): 2.5 TABLET, FILM COATED ORAL at 06:29

## 2020-04-19 RX ADMIN — Medication 1 APPLICATION(S): at 10:40

## 2020-04-19 RX ADMIN — Medication 1 MILLIGRAM(S): at 17:06

## 2020-04-19 RX ADMIN — Medication 1: at 23:36

## 2020-04-19 RX ADMIN — Medication 1: at 13:01

## 2020-04-19 RX ADMIN — BUMETANIDE 2 MILLIGRAM(S): 0.25 INJECTION INTRAMUSCULAR; INTRAVENOUS at 17:06

## 2020-04-19 RX ADMIN — Medication 2: at 18:10

## 2020-04-19 RX ADMIN — INSULIN GLARGINE 8 UNIT(S): 100 INJECTION, SOLUTION SUBCUTANEOUS at 23:37

## 2020-04-19 RX ADMIN — Medication 15 MILLIGRAM(S): at 10:40

## 2020-04-19 RX ADMIN — SODIUM ZIRCONIUM CYCLOSILICATE 10 GRAM(S): 10 POWDER, FOR SUSPENSION ORAL at 10:40

## 2020-04-19 NOTE — PROGRESS NOTE ADULT - ATTENDING COMMENTS
patient seen and evaluated this morning at bedside.  respiratory distress on Bipap.  awaiting lab work today. agree with increasing diuretics.  follow up further goals of care.

## 2020-04-19 NOTE — PROGRESS NOTE ADULT - SUBJECTIVE AND OBJECTIVE BOX
[facility] Division of Kidney Diseases & Hypertension  FOLLOW UP NOTE  [phone]--------------------------------------------------------------------------------  HPI: 75 yo F with RA c/b chronic bronchiectasis, HTN, and DM2 is admitted with SOB. Nephrology team is consulted for NBA and hyperkalemia. On admission (4/4/20), Scr was elevated to 2.82, which as worsened to 3.31 on 4/5/20. Patient required urgent HD on 4/6/20. Last HD session was on 4/14/20. Patient had expressed previously she would not want HD long term. Pt started on IV diuretics. SCr: 3.63 on 4/18/20. Repeat AM Labs pending.    Pt seen and examined at bedside. She is on BiPAP. She is drowsy however responds occasionally to commands.   Pt with  cc over the past 24 hours    PAST HISTORY  --------------------------------------------------------------------------------  No significant changes to PMH, PSH, FHx, SHx, unless otherwise noted    ALLERGIES & MEDICATIONS  --------------------------------------------------------------------------------  Allergies    No Known Allergies    Intolerances      Standing Inpatient Medications  apixaban 5 milliGRAM(s) Oral two times a day  buMETAnide Injectable 2 milliGRAM(s) IV Push two times a day  cefTRIAXone   IVPB 2000 milliGRAM(s) IV Intermittent every 24 hours  dextrose 5%. 1000 milliLiter(s) IV Continuous <Continuous>  dextrose 50% Injectable 12.5 Gram(s) IV Push once  dextrose 50% Injectable 12.5 Gram(s) IV Push once  insulin glargine Injectable (LANTUS) 8 Unit(s) SubCutaneous at bedtime  insulin lispro (HumaLOG) corrective regimen sliding scale   SubCutaneous Before meals and at bedtime  metoprolol tartrate 25 milliGRAM(s) Oral two times a day  predniSONE   Tablet 15 milliGRAM(s) Oral daily  silver sulfADIAZINE 1% Cream 1 Application(s) Topical daily  sodium zirconium cyclosilicate 10 Gram(s) Oral daily    PRN Inpatient Medications  acetaminophen   Tablet .. 650 milliGRAM(s) Oral every 6 hours PRN  dextrose 40% Gel 15 Gram(s) Oral once PRN  dextrose 40% Gel 15 Gram(s) Oral once PRN  glucagon  Injectable 1 milliGRAM(s) IntraMuscular once PRN  midodrine. 10 milliGRAM(s) Oral <User Schedule> PRN  ondansetron Injectable 4 milliGRAM(s) IV Push every 8 hours PRN  simethicone 80 milliGRAM(s) Chew two times a day PRN      REVIEW OF SYSTEMS  --------------------------------------------------------------------------------  Unable to obtain due to current clinical condition.     VITALS/PHYSICAL EXAM  --------------------------------------------------------------------------------  T(C): 36.9 (04-19-20 @ 10:14), Max: 36.9 (04-18-20 @ 18:57)  HR: 126 (04-19-20 @ 10:14) (85 - 126)  BP: 110/66 (04-19-20 @ 10:14) (103/60 - 146/74)  RR: 18 (04-19-20 @ 10:14) (17 - 18)  SpO2: 98% (04-19-20 @ 10:14) (95% - 100%)  Wt(kg): --        04-18-20 @ 07:01  -  04-19-20 @ 07:00  --------------------------------------------------------  IN: 450 mL / OUT: 250 mL / NET: 200 mL      Physical Exam:              Gen: drowsy, but  responding, on BiPAP  	HEENT: supple neck  	Pulm: fair air entry, diminished breath sounds  	CV: RRR, S1S2  	Abd: +BS, soft  	: + Cevallos catheter   	LE: (+) bilateral edema  	Skin: Right dorsal foot wound, wound on right forearm	    LABS/STUDIES  --------------------------------------------------------------------------------              7.6    11.55 >-----------<  189      [04-18-20 @ 05:45]              27.5     142  |  100  |  100  ----------------------------<  196      [04-18-20 @ 03:00]  5.4   |  25  |  3.63        Ca     8.1     [04-18-20 @ 03:00]      Mg     2.2     [04-18-20 @ 03:00]      Phos  7.2     [04-18-20 @ 03:00]    TPro  5.8  /  Alb  1.9  /  TBili  < 0.2  /  DBili  x   /  AST  10  /  ALT  18  /  AlkPhos  150  [04-18-20 @ 03:00]          Creatinine Trend:  SCr 3.63 [04-18 @ 03:00]  SCr 3.41 [04-17 @ 05:54]  SCr 3.37 [04-16 @ 21:19]  SCr 3.34 [04-16 @ 07:16]  SCr 2.84 [04-15 @ 06:20]        Iron 24, TIBC 135, %sat --      [04-14-20 @ 06:00]  Ferritin 1068      [04-14-20 @ 06:00]  TSH 2.98      [04-14-20 @ 06:00]

## 2020-04-19 NOTE — PROGRESS NOTE ADULT - PROBLEM SELECTOR PLAN 1
Pt. initially presented with severe hyperkalemia in the setting of NBA requiring urgent HD. Serum potassium today is 5.4. Pt on Lokelma 10 mg daily. Low potassium diet. Monitor urine output.  Monitor serum potassium. The patient is neutropenic, afebrile ( last fever on 8/19)  s/p pancx 8/19  Cultures  no growth to date   Continue vanco 1 g q12h empirically with Vanco trough 12.2 then wkly  Continue Posaconazole, change Cefepime to Aztreonam as patient has persistent pruritis.  (Monitor QTc BIW mon/Thurs  with Posaconazole and Study drug)  8/9 CT chest: Pulmonary fibrosis and traction bronchiectasis without honeycombing.   Overall these findings appear progressed compared to CT abdomen 7/4/2017  Associated ground-glass opacity may be related to active interstitial lung disease, fluid overload, less likely infection The patient is neutropenic, afebrile   Cultures  no growth to date   Continue vanco 1 g q12h empirically with Vanco trough 12.2 then wkly  Continue Posaconazole, change Cefepime to Aztreonam as patient has persistent pruritis.  (Monitor QTc BIW mon/Thurs  with Posaconazole and Study drug)  8/9 CT chest: Pulmonary fibrosis and traction bronchiectasis without honeycombing.   Overall these findings appear progressed compared to CT abdomen 7/4/2017  Associated ground-glass opacity may be related to active interstitial lung disease, fluid overload, less likely infection

## 2020-04-19 NOTE — PROGRESS NOTE ADULT - PROBLEM SELECTOR PLAN 2
Pt. with likely NBA in the setting of infection, low BP, and decreased oral intake. Pt. with probable CKD in the setting of long-standing history of DM and HTN. Pt. with normal Scr of 0.96 on 6/7/16. SCr: 3.63 on 4/18/20. No recent labs today. Pt with decreased UO. Recommend increasing Bumex dose 3mg TID. Would consider adding Metolzone if pt still remains oliguric.  Patient would not be a candidate for long term HD, will medically manage. Consider Palliative consult. Monitor labs, monitor urine output. Pt. with likely NBA in the setting of infection, low BP, and decreased oral intake. Pt. with probable CKD in the setting of long-standing history of DM and HTN. Pt. with normal Scr of 0.96 on 6/7/16. SCr: 3.63 on 4/18/20. No recent labs today. Pt with decreased UO. Recommend increasing Bumex dose 4mg IV BID. Would consider adding Metolzone if pt still remains oliguric.  Patient would not be a candidate for long term HD, will medically manage. Consider Palliative consult. Monitor labs, monitor urine output. Pt. with likely NBA in the setting of infection, low BP, and decreased oral intake. Pt. with probable CKD in the setting of long-standing history of DM and HTN. Pt. with normal Scr of 0.96 on 6/7/16. SCr: 3.63 on 4/18/20. No recent labs today. Pt with decreased UO. Recommend increasing Bumex dose 4mg IV BID. Would consider adding Metolzone if pt still remains oliguric.  Patient would not be a good candidate for long term HD, will medically manage. Consider Palliative consult. Monitor labs, monitor urine output. Anesthesia Volume In Cc: 2

## 2020-04-19 NOTE — PROGRESS NOTE ADULT - PROBLEM SELECTOR PLAN 6
on chronic prednisone  spoke w outpat Rheumatolgist, for spinal stenosis  on pred 15mg, will lower to 12.5 after 7 days of pred 15

## 2020-04-19 NOTE — CHART NOTE - NSCHARTNOTEFT_GEN_A_CORE
Multiple attempts made to obtain ABG, however attempts were unsuccessful. Respiratory Therapist attempted and another PA attempted but both were unsuccessful. Will have day team reattempt.

## 2020-04-19 NOTE — PROGRESS NOTE ADULT - SUBJECTIVE AND OBJECTIVE BOX
DIOR SUTTON  74y  Female      Patient is a 74y old  Female who presents with a chief complaint of LE swelling (19 Apr 2020 10:40)      INTERVAL HPI/OVERNIGHT EVENTS:  Pt feeling ok. No fevers, chills, rashes.     Medications:  acetaminophen   Tablet .. 650 milliGRAM(s) Oral every 6 hours PRN  ALBUTerol    90 MICROgram(s) HFA Inhaler 2 Puff(s) Inhalation every 6 hours PRN  apixaban 5 milliGRAM(s) Oral two times a day  budesonide  80 MICROgram(s)/formoterol 4.5 MICROgram(s) Inhaler 2 Puff(s) Inhalation two times a day  buMETAnide Injectable 2 milliGRAM(s) IV Push two times a day  cefTRIAXone   IVPB 2000 milliGRAM(s) IV Intermittent every 24 hours  dextrose 40% Gel 15 Gram(s) Oral once PRN  dextrose 40% Gel 15 Gram(s) Oral once PRN  dextrose 5%. 1000 milliLiter(s) IV Continuous <Continuous>  dextrose 50% Injectable 12.5 Gram(s) IV Push once  dextrose 50% Injectable 12.5 Gram(s) IV Push once  folic acid 1 milliGRAM(s) Oral daily  glucagon  Injectable 1 milliGRAM(s) IntraMuscular once PRN  insulin glargine Injectable (LANTUS) 8 Unit(s) SubCutaneous at bedtime  insulin lispro (HumaLOG) corrective regimen sliding scale   SubCutaneous Before meals and at bedtime  metoprolol tartrate 25 milliGRAM(s) Oral two times a day  midodrine. 10 milliGRAM(s) Oral <User Schedule> PRN  ondansetron Injectable 4 milliGRAM(s) IV Push every 8 hours PRN  predniSONE   Tablet 15 milliGRAM(s) Oral daily  silver sulfADIAZINE 1% Cream 1 Application(s) Topical daily  simethicone 80 milliGRAM(s) Chew two times a day PRN  sodium zirconium cyclosilicate 10 Gram(s) Oral daily      REVIEW OF SYSTEMS:  as per HPI, otherwise negative    T(C): 36.9 (04-19-20 @ 10:14), Max: 36.9 (04-18-20 @ 18:57)  HR: 101 (04-19-20 @ 12:25) (85 - 126)  BP: 110/66 (04-19-20 @ 10:14) (103/60 - 146/74)  RR: 18 (04-19-20 @ 10:14) (17 - 18)  SpO2: 97% (04-19-20 @ 12:25) (95% - 100%)  Wt(kg): --Vital Signs Last 24 Hrs  T(C): 36.9 (19 Apr 2020 10:14), Max: 36.9 (18 Apr 2020 18:57)  T(F): 98.4 (19 Apr 2020 10:14), Max: 98.4 (18 Apr 2020 18:57)  HR: 101 (19 Apr 2020 12:25) (85 - 126)  BP: 110/66 (19 Apr 2020 10:14) (103/60 - 146/74)  BP(mean): --  RR: 18 (19 Apr 2020 10:14) (17 - 18)  SpO2: 97% (19 Apr 2020 12:25) (95% - 100%)    PHYSICAL EXAM:  CONSTITUTIONAL: NAD, obese  ENMT: Moist oral mucosa, no pharyngeal injection or exudates; normal dentition  RESPIRATORY: Normal respiratory effort; lungs clear, but low breath sounds  CARDIOVASCULAR: RRR, no m,r,g; anasarca   ABDOMEN: Nontender to palpation, normoactive bowel sounds, no rebound/guarding; No hepatosplenomegaly  PSYCH: A+2 to person, place,   SKIN: superficial hematoma, abrasions of skin consistent with long term prednisone use     Consultant(s) Notes Reviewed:  [x ] YES  [ ] NO  Care Discussed with Consultants/Other Providers [ x] YES  [ ] NO    LABS:                        7.7    10.63 )-----------( 210      ( 19 Apr 2020 10:23 )             26.8     04-19    141  |  100  |  103<H>  ----------------------------<  174<H>  4.6   |  25  |  3.75<H>    Ca    8.0<L>      19 Apr 2020 10:23  Phos  6.4     04-19  Mg     2.0     04-19    TPro  5.7<L>  /  Alb  2.0<L>  /  TBili  < 0.2<L>  /  DBili  x   /  AST  10  /  ALT  18  /  AlkPhos  136<H>  04-19        CAPILLARY BLOOD GLUCOSE      POCT Blood Glucose.: 189 mg/dL (19 Apr 2020 12:48)  POCT Blood Glucose.: 150 mg/dL (19 Apr 2020 08:52)  POCT Blood Glucose.: 137 mg/dL (18 Apr 2020 22:36)  POCT Blood Glucose.: 202 mg/dL (18 Apr 2020 17:21)      ABG - ( 19 Apr 2020 10:23 )  pH, Arterial: 7.32  pH, Blood: x     /  pCO2: 53    /  pO2: 161   / HCO3: 25    / Base Excess: 1.2   /  SaO2: 96.8                  RADIOLOGY & ADDITIONAL TESTS:    Imaging Personally Reviewed:  [ ] YES  [ ] NO    HEALTH ISSUES - PROBLEM Dx:  Metabolic encephalopathy: Metabolic encephalopathy  Chronic obstructive pulmonary disease, unspecified COPD type: Chronic obstructive pulmonary disease, unspecified COPD type  Pancreatic mass: Pancreatic mass  Anemia due to blood loss: Anemia due to blood loss  Hypertension, unspecified type: Hypertension, unspecified type  E coli bacteremia: E coli bacteremia  Rheumatoid arthritis of hip, unspecified laterality, unspecified rheumatoid factor presence: Rheumatoid arthritis of hip, unspecified laterality, unspecified rheumatoid factor presence  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 complication: 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 complication  Acute renal failure, unspecified acute renal failure type: Acute renal failure, unspecified acute renal failure type  Renal failure, unspecified chronicity: Renal failure, unspecified chronicity  Rheumatoid arthritis: Rheumatoid arthritis  SIRS (systemic inflammatory response syndrome): SIRS (systemic inflammatory response syndrome)  Discharge planning issues: Discharge planning issues  Prophylactic measure: Prophylactic measure  Bronchiectasis: Bronchiectasis  Hypertension: Hypertension  Type 2 diabetes mellitus: Type 2 diabetes mellitus  Acute on chronic diastolic (congestive) heart failure: Acute on chronic diastolic (congestive) heart failure  Hyperkalemia: Hyperkalemia  Acute renal failure: Acute renal failure

## 2020-04-19 NOTE — PROGRESS NOTE ADULT - PROBLEM SELECTOR PLAN 10
PT eval  GOC discussion with palliative, given pt has verbally repeated she is interested in comfort measures

## 2020-04-19 NOTE — CHART NOTE - NSCHARTNOTEFT_GEN_A_CORE
Patient with heart rate in 120s-130s. Patient asymptomatic. EKG done shows Aflutter at 125 bpm with variable block. QT/QTc 272/392.Metoprolol 2.5 mg IVP ordered. Will continue to monitor.    Vital Signs Last 24 Hrs  T(C): 36.7 (19 Apr 2020 03:38), Max: 36.9 (18 Apr 2020 18:57)  T(F): 98.1 (19 Apr 2020 03:38), Max: 98.4 (18 Apr 2020 18:57)  HR: 100 (19 Apr 2020 03:38) (85 - 110)  BP: 117/78 (19 Apr 2020 03:38) (103/60 - 117/78)  BP(mean): --  RR: 18 (19 Apr 2020 03:38) (17 - 18)  SpO2: 100% (19 Apr 2020 03:38) (95% - 100%) Patient with heart rate in 120s-130s. Patient asymptomatic. EKG done shows Aflutter at 125 bpm with variable block. QT/QTc 272/392. Patient with previous EKGs with similar findings. Metoprolol 2.5 mg IVP ordered. Will continue to monitor.    Vital Signs Last 24 Hrs  T(C): 36.7 (19 Apr 2020 03:38), Max: 36.9 (18 Apr 2020 18:57)  T(F): 98.1 (19 Apr 2020 03:38), Max: 98.4 (18 Apr 2020 18:57)  HR: 100 (19 Apr 2020 03:38) (85 - 110)  BP: 117/78 (19 Apr 2020 03:38) (103/60 - 117/78)  BP(mean): --  RR: 18 (19 Apr 2020 03:38) (17 - 18)  SpO2: 100% (19 Apr 2020 03:38) (95% - 100%)

## 2020-04-20 LAB
ALBUMIN SERPL ELPH-MCNC: 2 G/DL — LOW (ref 3.3–5)
ALP SERPL-CCNC: 129 U/L — HIGH (ref 40–120)
ALT FLD-CCNC: 18 U/L — SIGNIFICANT CHANGE UP (ref 4–33)
ANION GAP SERPL CALC-SCNC: 21 MMO/L — HIGH (ref 7–14)
AST SERPL-CCNC: 11 U/L — SIGNIFICANT CHANGE UP (ref 4–32)
BASE EXCESS BLDA CALC-SCNC: 1.9 MMOL/L — SIGNIFICANT CHANGE UP
BILIRUB SERPL-MCNC: 0.2 MG/DL — SIGNIFICANT CHANGE UP (ref 0.2–1.2)
BLOOD GAS ARTERIAL - FIO2: 21 — SIGNIFICANT CHANGE UP
BUN SERPL-MCNC: 112 MG/DL — HIGH (ref 7–23)
CALCIUM SERPL-MCNC: 7.9 MG/DL — LOW (ref 8.4–10.5)
CHLORIDE SERPL-SCNC: 104 MMOL/L — SIGNIFICANT CHANGE UP (ref 98–107)
CO2 SERPL-SCNC: 23 MMOL/L — SIGNIFICANT CHANGE UP (ref 22–31)
CREAT SERPL-MCNC: 3.89 MG/DL — HIGH (ref 0.5–1.3)
GLUCOSE BLDC GLUCOMTR-MCNC: 157 MG/DL — HIGH (ref 70–99)
GLUCOSE BLDC GLUCOMTR-MCNC: 182 MG/DL — HIGH (ref 70–99)
GLUCOSE BLDC GLUCOMTR-MCNC: 185 MG/DL — HIGH (ref 70–99)
GLUCOSE BLDC GLUCOMTR-MCNC: 188 MG/DL — HIGH (ref 70–99)
GLUCOSE SERPL-MCNC: 155 MG/DL — HIGH (ref 70–99)
HCO3 BLDA-SCNC: 26 MMOL/L — SIGNIFICANT CHANGE UP (ref 22–26)
MAGNESIUM SERPL-MCNC: 2.1 MG/DL — SIGNIFICANT CHANGE UP (ref 1.6–2.6)
PCO2 BLDA: 68 MMHG — HIGH (ref 32–48)
PH BLDA: 7.25 PH — LOW (ref 7.35–7.45)
PHOSPHATE SERPL-MCNC: 6.9 MG/DL — HIGH (ref 2.5–4.5)
PO2 BLDA: 155 MMHG — HIGH (ref 83–108)
POTASSIUM SERPL-MCNC: 4.9 MMOL/L — SIGNIFICANT CHANGE UP (ref 3.5–5.3)
POTASSIUM SERPL-SCNC: 4.9 MMOL/L — SIGNIFICANT CHANGE UP (ref 3.5–5.3)
PROT SERPL-MCNC: 5.6 G/DL — LOW (ref 6–8.3)
SAO2 % BLDA: 98.2 % — SIGNIFICANT CHANGE UP (ref 95–99)
SODIUM SERPL-SCNC: 148 MMOL/L — HIGH (ref 135–145)

## 2020-04-20 PROCEDURE — 99233 SBSQ HOSP IP/OBS HIGH 50: CPT | Mod: GC

## 2020-04-20 PROCEDURE — 99498 ADVNCD CARE PLAN ADDL 30 MIN: CPT | Mod: 25

## 2020-04-20 PROCEDURE — 99232 SBSQ HOSP IP/OBS MODERATE 35: CPT

## 2020-04-20 PROCEDURE — 99232 SBSQ HOSP IP/OBS MODERATE 35: CPT | Mod: CS

## 2020-04-20 PROCEDURE — 99223 1ST HOSP IP/OBS HIGH 75: CPT

## 2020-04-20 PROCEDURE — 99497 ADVNCD CARE PLAN 30 MIN: CPT | Mod: 25

## 2020-04-20 RX ORDER — FUROSEMIDE 40 MG
80 TABLET ORAL
Refills: 0 | Status: DISCONTINUED | OUTPATIENT
Start: 2020-04-20 | End: 2020-04-24

## 2020-04-20 RX ORDER — SODIUM BICARBONATE 1 MEQ/ML
650 SYRINGE (ML) INTRAVENOUS THREE TIMES A DAY
Refills: 0 | Status: DISCONTINUED | OUTPATIENT
Start: 2020-04-20 | End: 2020-04-24

## 2020-04-20 RX ORDER — CIPROFLOXACIN LACTATE 400MG/40ML
250 VIAL (ML) INTRAVENOUS
Refills: 0 | Status: COMPLETED | OUTPATIENT
Start: 2020-04-20 | End: 2020-04-24

## 2020-04-20 RX ADMIN — Medication 250 MILLIGRAM(S): at 16:21

## 2020-04-20 RX ADMIN — Medication 25 MILLIGRAM(S): at 09:28

## 2020-04-20 RX ADMIN — APIXABAN 5 MILLIGRAM(S): 2.5 TABLET, FILM COATED ORAL at 21:50

## 2020-04-20 RX ADMIN — BUMETANIDE 2 MILLIGRAM(S): 0.25 INJECTION INTRAMUSCULAR; INTRAVENOUS at 05:53

## 2020-04-20 RX ADMIN — SODIUM ZIRCONIUM CYCLOSILICATE 10 GRAM(S): 10 POWDER, FOR SUSPENSION ORAL at 12:02

## 2020-04-20 RX ADMIN — Medication 1: at 21:52

## 2020-04-20 RX ADMIN — Medication 1: at 13:20

## 2020-04-20 RX ADMIN — Medication 15 MILLIGRAM(S): at 05:53

## 2020-04-20 RX ADMIN — Medication 650 MILLIGRAM(S): at 21:57

## 2020-04-20 RX ADMIN — APIXABAN 5 MILLIGRAM(S): 2.5 TABLET, FILM COATED ORAL at 05:53

## 2020-04-20 RX ADMIN — Medication 1 MILLIGRAM(S): at 12:02

## 2020-04-20 RX ADMIN — INSULIN GLARGINE 8 UNIT(S): 100 INJECTION, SOLUTION SUBCUTANEOUS at 21:52

## 2020-04-20 RX ADMIN — Medication 25 MILLIGRAM(S): at 21:51

## 2020-04-20 RX ADMIN — BUDESONIDE AND FORMOTEROL FUMARATE DIHYDRATE 2 PUFF(S): 160; 4.5 AEROSOL RESPIRATORY (INHALATION) at 21:50

## 2020-04-20 RX ADMIN — Medication 80 MILLIGRAM(S): at 21:58

## 2020-04-20 RX ADMIN — Medication 1 APPLICATION(S): at 12:02

## 2020-04-20 RX ADMIN — Medication 1: at 09:28

## 2020-04-20 RX ADMIN — BUDESONIDE AND FORMOTEROL FUMARATE DIHYDRATE 2 PUFF(S): 160; 4.5 AEROSOL RESPIRATORY (INHALATION) at 09:28

## 2020-04-20 RX ADMIN — Medication 1: at 17:29

## 2020-04-20 NOTE — PROGRESS NOTE ADULT - PROBLEM SELECTOR PLAN 1
Pt. initially presented with severe hyperkalemia in the setting of NBA requiring urgent HD. Serum potassium today is 4.9 (mildly hemolyzed). Pt on Lokelma 10 mg daily. Low potassium diet. Monitor urine output.  Monitor serum potassium.

## 2020-04-20 NOTE — OCCUPATIONAL THERAPY INITIAL EVALUATION ADULT - ANTICIPATED DISCHARGE DISPOSITION, OT EVAL
To be determined upon functional out of bed assessment if pt is appropriate for rehab services. Pt with difficulty following commands. Please continue to follow progress notes closely

## 2020-04-20 NOTE — CHART NOTE - NSCHARTNOTEFT_GEN_A_CORE
Reviewed ABG with Dr. Bosch and with nephrology. Will place pt back on BiPAP now and add Sodium Bicarb tabs Reviewed ABG with Dr. Bosch and with nephrology. Given respiratory and metabolic acidosis will place pt back on BiPAP now and add Sodium Bicarb tabs 650mg TID.

## 2020-04-20 NOTE — OCCUPATIONAL THERAPY INITIAL EVALUATION ADULT - LIVES WITH, PROFILE
Pt. reports she lives with her  in a 5th floor apartment. Accuracy of Social History questionable secondary to pt. with noted confusion & difficulty following commands during OT evaluation.

## 2020-04-20 NOTE — PROGRESS NOTE ADULT - PROBLEM SELECTOR PLAN 2
Pt. with likely NBA in the setting of infection, low BP, and decreased oral intake. Pt. with probable CKD in the setting of long-standing history of DM and HTN. Pt. with normal Scr of 0.96 on 6/7/16. SCr remains elevated but stable at 3.89 today. GOC appreciated. Continue IV Bumex. Monitor labs, monitor urine output.    If any questions, please feel free to contact me  Oliver Ricci   Nephrology Fellow  [phone]  (After 5 pm or on weekends please page the on-call fellow)

## 2020-04-20 NOTE — PROGRESS NOTE ADULT - ASSESSMENT
75 yo F w/ PMH HLD, HTN, insulin dependent T2DM, RA (on chronic prednisone, leflunomide, and hydroxychloroquine) c/b bronchiectasis (on home O2 4 L NC) who p/w worsening LE swelling  Leukocytosis, no fever  E coli bacteremia, unclear source, S to CTX  COVID neg, CXR clear, no fevers--would not retest as we have alternate explanation for inflammatory response  CT A/P reassuring but with pancreatic lesion  Overall,  1) E coli bacteremia  - Cipro PO 250mg q 12 through 4/24/20  - DC ceftriaxone  2) R/O COVID  - CXR clear, no fevers, COVID PCR neg, lower suspicion for COVID  3) Leukocytosis  - Trend to normal  4) LE wounds  - Wound care per primary team  5) Pancreatic lesion  - Care/workup per primary team    Oral Peacock MD  Pager [phone]  After 5pm and on weekends call [phone]

## 2020-04-20 NOTE — CONSULT NOTE ADULT - PROBLEM SELECTOR RECOMMENDATION 5
.  # Code: FULL  # HCP:  and son share decision making    > Family is leaning towards comfort measures but wanted to speak amongst themselves first  > The patient had said that she did not want to be on long-term hemodialysis  > We will follow-up with the family    Due to visitation restrictions in the hospital in light of COVID pandemic, all discussions with the patient/ patient's healthcare proxy or surrogate have been done via telehealth. This is to prevent spread of infection within the hospital and out in the community. Total time discussing advance care planning, goals of care discussions, and discussions about code status and hospice = 46 mins

## 2020-04-20 NOTE — OCCUPATIONAL THERAPY INITIAL EVALUATION ADULT - PERTINENT HX OF CURRENT PROBLEM, REHAB EVAL
Pt is a 74 year old female with hx of HLD, HTN, insulin dependent T2DM, RA (on chronic prednisone, leflunomide, and hydroxychloroquine) c/b bronchiectasis (on home O2 4 L NC) who presented to [facility] on 4/4/2020 with worsening LE swelling and SOB. COVID negative. Pt admitted with acute renal failure complicated by hyperkalemia and acute on chronic diastolic heart failure. (See continued below)

## 2020-04-20 NOTE — PROGRESS NOTE ADULT - SUBJECTIVE AND OBJECTIVE BOX
DIOR SUTTON  74y  Female      Patient is a 74y old  Female who presents with a chief complaint of LE swelling (20 Apr 2020 11:37)      INTERVAL HPI/OVERNIGHT EVENTS:  Pt lethargic today. More alert yesterday. No fevers, chills, SOB.     Medications:  acetaminophen   Tablet .. 650 milliGRAM(s) Oral every 6 hours PRN  ALBUTerol    90 MICROgram(s) HFA Inhaler 2 Puff(s) Inhalation every 6 hours PRN  apixaban 5 milliGRAM(s) Oral two times a day  budesonide  80 MICROgram(s)/formoterol 4.5 MICROgram(s) Inhaler 2 Puff(s) Inhalation two times a day  buMETAnide Injectable 2 milliGRAM(s) IV Push two times a day  cefTRIAXone   IVPB 2000 milliGRAM(s) IV Intermittent every 24 hours  dextrose 40% Gel 15 Gram(s) Oral once PRN  dextrose 40% Gel 15 Gram(s) Oral once PRN  dextrose 5%. 1000 milliLiter(s) IV Continuous <Continuous>  dextrose 50% Injectable 12.5 Gram(s) IV Push once  dextrose 50% Injectable 12.5 Gram(s) IV Push once  folic acid 1 milliGRAM(s) Oral daily  glucagon  Injectable 1 milliGRAM(s) IntraMuscular once PRN  insulin glargine Injectable (LANTUS) 8 Unit(s) SubCutaneous at bedtime  insulin lispro (HumaLOG) corrective regimen sliding scale   SubCutaneous Before meals and at bedtime  metoprolol tartrate 25 milliGRAM(s) Oral two times a day  midodrine. 10 milliGRAM(s) Oral <User Schedule> PRN  ondansetron Injectable 4 milliGRAM(s) IV Push every 8 hours PRN  predniSONE   Tablet 15 milliGRAM(s) Oral daily  silver sulfADIAZINE 1% Cream 1 Application(s) Topical daily  simethicone 80 milliGRAM(s) Chew two times a day PRN  sodium zirconium cyclosilicate 10 Gram(s) Oral daily      REVIEW OF SYSTEMS:  as per HPI, otherwise negative    T(C): 36.8 (04-20-20 @ 09:26), Max: 36.8 (04-20-20 @ 09:26)  HR: 104 (04-20-20 @ 09:26) (98 - 139)  BP: 129/50 (04-20-20 @ 09:26) (101/60 - 129/50)  RR: 18 (04-20-20 @ 09:26) (18 - 18)  SpO2: 100% (04-20-20 @ 09:26) (96% - 100%)  Wt(kg): --Vital Signs Last 24 Hrs  T(C): 36.8 (20 Apr 2020 09:26), Max: 36.8 (20 Apr 2020 09:26)  T(F): 98.3 (20 Apr 2020 09:26), Max: 98.3 (20 Apr 2020 09:26)  HR: 104 (20 Apr 2020 09:26) (98 - 139)  BP: 129/50 (20 Apr 2020 09:26) (101/60 - 129/50)  BP(mean): --  RR: 18 (20 Apr 2020 09:26) (18 - 18)  SpO2: 100% (20 Apr 2020 09:26) (96% - 100%)    PHYSICAL EXAM:  CONSTITUTIONAL: NAD, obese  ENMT: Moist oral mucosa, no pharyngeal injection or exudates; normal dentition  RESPIRATORY: Normal respiratory effort; lungs clear, but low breath sounds  CARDIOVASCULAR: RRR, no m,r,g; anasarca   ABDOMEN: Nontender to palpation, normoactive bowel sounds, no rebound/guarding; No hepatosplenomegaly  PSYCH: A+O X 1  SKIN: superficial hematoma, abrasions of skin consistent with long term prednisone use     Consultant(s) Notes Reviewed:  [x ] YES  [ ] NO  Care Discussed with Consultants/Other Providers [ x] YES  [ ] NO    LABS:                        7.7    10.63 )-----------( 210      ( 19 Apr 2020 10:23 )             26.8     04-20    148<H>  |  104  |  112<H>  ----------------------------<  155<H>  4.9   |  23  |  3.89<H>    Ca    7.9<L>      20 Apr 2020 06:26  Phos  6.9     04-20  Mg     2.1     04-20    TPro  5.6<L>  /  Alb  2.0<L>  /  TBili  0.2  /  DBili  x   /  AST  11  /  ALT  18  /  AlkPhos  129<H>  04-20        CAPILLARY BLOOD GLUCOSE      POCT Blood Glucose.: 182 mg/dL (20 Apr 2020 13:05)  POCT Blood Glucose.: 157 mg/dL (20 Apr 2020 09:05)  POCT Blood Glucose.: 199 mg/dL (19 Apr 2020 22:54)  POCT Blood Glucose.: 203 mg/dL (19 Apr 2020 17:22)      ABG - ( 19 Apr 2020 10:23 )  pH, Arterial: 7.32  pH, Blood: x     /  pCO2: 53    /  pO2: 161   / HCO3: 25    / Base Excess: 1.2   /  SaO2: 96.8                  RADIOLOGY & ADDITIONAL TESTS:    Imaging Personally Reviewed:  [ ] YES  [ ] NO    HEALTH ISSUES - PROBLEM Dx:  Afib: Afib  Metabolic encephalopathy: Metabolic encephalopathy  Chronic obstructive pulmonary disease, unspecified COPD type: Chronic obstructive pulmonary disease, unspecified COPD type  Pancreatic mass: Pancreatic mass  Anemia due to blood loss: Anemia due to blood loss  Hypertension, unspecified type: Hypertension, unspecified type  E coli bacteremia: E coli bacteremia  Rheumatoid arthritis of hip, unspecified laterality, unspecified rheumatoid factor presence: Rheumatoid arthritis of hip, unspecified laterality, unspecified rheumatoid factor presence  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 complication: 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 complication  Acute renal failure, unspecified acute renal failure type: Acute renal failure, unspecified acute renal failure type  Renal failure, unspecified chronicity: Renal failure, unspecified chronicity  Rheumatoid arthritis: Rheumatoid arthritis  SIRS (systemic inflammatory response syndrome): SIRS (systemic inflammatory response syndrome)  Discharge planning issues: Discharge planning issues  Prophylactic measure: Prophylactic measure  Bronchiectasis: Bronchiectasis  Hypertension: Hypertension  Type 2 diabetes mellitus: Type 2 diabetes mellitus  Acute on chronic diastolic (congestive) heart failure: Acute on chronic diastolic (congestive) heart failure  Hyperkalemia: Hyperkalemia  Acute renal failure: Acute renal failure

## 2020-04-20 NOTE — OCCUPATIONAL THERAPY INITIAL EVALUATION ADULT - PATIENT/FAMILY/SIGNIFICANT OTHER GOALS STATEMENT, OT EVAL
Pt. unable to formulate a goal secondary to noted confusion with difficulty following commands during OT Evaluation.

## 2020-04-20 NOTE — CONSULT NOTE ADULT - SUBJECTIVE AND OBJECTIVE BOX
HPI:  Patient is a 74 y.o F w/ PMH HLD, HTN, insulin dependent T2DM, RA (on chronic prednisone, leflunomide, and hydroxychloroquine) c/b bronchiectasis (on home O2 4 L NC) who presents with advanced CKD s/p initiation of iHD. Palliative consulted for GOC.      4/20/20  - I spoke to the pt's  and son over the phone. I answered their questions and broached GOC, namely: renal failure and dialysis, code status, and hospice.  - They both understand that this is advanced CKD. While temporizing interventions were started to treat acidemia, volume overload and hyperkalemia, I explained that her azotemia is profound and that she will likely need maintenance RRT.  - The son confirmed that the patient had said she did NOT want to be on long-term HD  - They want to talk it over right now, as the  is very distraught. The son, however, made it clear that if they opt for comfort measures/ hospice, he would want the patient HOME and not in a facility. He says they need to talk about finances and end of life issues as the pt lives with her  who is also disabled. They have our contact information and said they would reach out.       =================================================================================================  ----- PERTINENT PMH/ SXH/ FHX  Hyperlipidemia  Bronchiectasis  Type 2 diabetes mellitus  Hypertension  Rheumatoid arthritis    No significant past surgical history    FAMILY HISTORY:  No pertinent family history in first degree relatives      ----- SOCIAL HISTORY:   [ ] Unable to elicit  Significant other/partner: Yes [X ]  No [ ] Children:  Yes [X ]  No [ ] Judaism/Spirituality:  Substance hx: Yes[ ]  No [ ]   Tobacco hx:  Yes [ ] No [ ]   Alcohol hx: Yes [ ] No [ ]   Home Opioid hx:  [ ] I-Stop Reference No:  Living Situation: [ ]Home  [ ]Long term care  [ ]Rehab [ ]Other    ----- ADVANCE DIRECTIVES:    DNR  MOLST  [ ]  Living Will  [ ]   DECISION MAKER(s):  [ ] Health Care Proxy(s)  [ ] Surrogate(s)  [ ] Guardian           Name(s): Phone Number(s):    ----- BASELINE (I)ADL(s) (prior to admission):  [place]: [ ]Total  [ ] Moderate [ ]Dependent  Palliative Performance Status Version 2:            =================================================================================================  -----MEDICATIONS AND ALLERGIES:  Allergies    No Known Allergies    Intolerances    MEDICATIONS  (STANDING):  apixaban 5 milliGRAM(s) Oral two times a day  budesonide  80 MICROgram(s)/formoterol 4.5 MICROgram(s) Inhaler 2 Puff(s) Inhalation two times a day  ciprofloxacin     Tablet 250 milliGRAM(s) Oral two times a day  dextrose 5%. 1000 milliLiter(s) (50 mL/Hr) IV Continuous <Continuous>  dextrose 50% Injectable 12.5 Gram(s) IV Push once  dextrose 50% Injectable 12.5 Gram(s) IV Push once  folic acid 1 milliGRAM(s) Oral daily  furosemide    Tablet 80 milliGRAM(s) Oral two times a day  insulin glargine Injectable (LANTUS) 8 Unit(s) SubCutaneous at bedtime  insulin lispro (HumaLOG) corrective regimen sliding scale   SubCutaneous Before meals and at bedtime  metoprolol tartrate 25 milliGRAM(s) Oral two times a day  predniSONE   Tablet 15 milliGRAM(s) Oral daily  silver sulfADIAZINE 1% Cream 1 Application(s) Topical daily  sodium bicarbonate 650 milliGRAM(s) Oral three times a day  sodium zirconium cyclosilicate 10 Gram(s) Oral daily    MEDICATIONS  (PRN):  acetaminophen   Tablet .. 650 milliGRAM(s) Oral every 6 hours PRN Temp greater or equal to 38C (100.4F), Mild Pain (1 - 3), Moderate Pain (4 - 6)  ALBUTerol    90 MICROgram(s) HFA Inhaler 2 Puff(s) Inhalation every 6 hours PRN Shortness of Breath and/or Wheezing  dextrose 40% Gel 15 Gram(s) Oral once PRN Blood Glucose LESS THAN 70 milliGRAM(s)/deciliter  dextrose 40% Gel 15 Gram(s) Oral once PRN Blood Glucose LESS THAN 70 milliGRAM(s)/deciliter  glucagon  Injectable 1 milliGRAM(s) IntraMuscular once PRN Glucose LESS THAN 70 milligrams/deciliter  midodrine. 10 milliGRAM(s) Oral <User Schedule> PRN Pre-HD for Hypotension  ondansetron Injectable 4 milliGRAM(s) IV Push every 8 hours PRN Nausea and/or Vomiting  simethicone 80 milliGRAM(s) Chew two times a day PRN Upset Stomach        =================================================================================================  ----- SYMPTOM ASSESSMENT: [ ]Unable to obtain due to poor mentation   Source if other than patient:  [ ]Family   [ ]Team     Pain (Impact on QOL):    Location -   Severity -        Minimal acceptable level (0-10 scale):  Quality:   Onset:   Duration:                 Aggravating factors -  Relieving factors -  Radiation -    PAIN AD Score:     REVIEW OF SYSTEMS (not present if not checked off):  Unable to elicit [ ]  Dyspnea: [ ]  Anxiety: [ ]  Fatigue: [ ]  Nausea: [ ]                       Loss of appetite: [ ]  Constipation: [ ]  Grief: [ ]    Other Symptoms:  [ ]All other review of systems negative       =================================================================================================  ----- PHYSICAL EXAM:  Vital Signs Last 24 Hrs  T(C): 36.8 (21 Apr 2020 04:10), Max: 36.8 (20 Apr 2020 09:26)  T(F): 98.2 (21 Apr 2020 04:10), Max: 98.3 (20 Apr 2020 09:26)  HR: 99 (21 Apr 2020 04:10) (87 - 109)  BP: 123/59 (21 Apr 2020 07:43) (122/59 - 137/56)  BP(mean): --  RR: 18 (21 Apr 2020 04:10) (18 - 18)  SpO2: 96% (21 Apr 2020 04:10) (95% - 100%) I&O's Summary    20 Apr 2020 07:01  -  21 Apr 2020 07:00  --------------------------------------------------------  IN: 0 mL / OUT: 1100 mL / NET: -1100 mL      =================================================================================================  ----- LABS:                        8.2    10.57 )-----------( 231      ( 21 Apr 2020 06:20 )             29.9   04-21    147<H>  |  103  |  113<H>  ----------------------------<  134<H>  5.1   |  25  |  3.83<H>    Ca    8.0<L>      21 Apr 2020 06:20  Phos  7.3     04-21  Mg     2.1     04-21    TPro  5.8<L>  /  Alb  2.3<L>  /  TBili  0.2  /  DBili  x   /  AST  12  /  ALT  22  /  AlkPhos  132<H>  04-21        -----RADIOLOGY & ADDITIONAL STUDIES:    PROTEIN CALORIE MALNUTRITION PRESENT: [ ] Yes [ ] No  [ ] PPSV2 < or = to 30% [ ] significant weight loss  [ ] poor nutritional intake [ ] catabolic state [ ] anasarca     Albumin, Serum: 2.3 g/dL (04-21-20 @ 06:20) Due to the efforts to minimize all unnecessary patient interaction in light of the COVID pandemic, this encounter was done virtually from our office. A chart review of the provider's notes and diagnostic data was performed. The patient's symptoms were discussed with his providers. At this time, a physical examination was not performed. Face-to-face visits and PE's have been limited, as per policy, to patients for whom it is required for medical-decision making.     Pls refer to the primary team's note for pertinent examination findings.     =====================================================================  HPI:  Patient is a 74 y.o F w/ PMH HLD, HTN, insulin dependent T2DM, RA (on chronic prednisone, leflunomide, and hydroxychloroquine) c/b bronchiectasis (on home O2 4 L NC) who presents with advanced CKD s/p initiation of iHD. Palliative consulted for GOC.      4/20/20  - I spoke to the pt's  and son over the phone. I answered their questions and broached GOC, namely: renal failure and dialysis, code status, and hospice.  - They both understand that this is advanced CKD. While temporizing interventions were started to treat acidemia, volume overload and hyperkalemia, I explained that her azotemia is profound and that she will likely need maintenance RRT.  - The son confirmed that the patient had said she did NOT want to be on long-term HD  - They want to talk it over right now, as the  is very distraught. The son, however, made it clear that if they opt for comfort measures/ hospice, he would want the patient HOME and not in a facility. He says they need to talk about finances and end of life issues as the pt lives with her  who is also disabled. They have our contact information and said they would reach out.       =================================================================================================  ----- PERTINENT PMH/ SXH/ FHX  Hyperlipidemia  Bronchiectasis  Type 2 diabetes mellitus  Hypertension  Rheumatoid arthritis    No significant past surgical history    FAMILY HISTORY:  No pertinent family history in first degree relatives      ----- SOCIAL HISTORY:   [ ] Unable to elicit  Significant other/partner: Yes [X ]  No [ ] Children:  Yes [X ]  No [ ] Sikhism/Spirituality:  Substance hx: Yes[ ]  No [ ]   Tobacco hx:  Yes [ ] No [ ]   Alcohol hx: Yes [ ] No [ ]   Home Opioid hx:  [ ] I-Stop Reference No:  Living Situation: [ ]Home  [ ]Long term care  [ ]Rehab [ ]Other    ----- ADVANCE DIRECTIVES:    DNR  MOLST  [ ]  Living Will  [ ]   DECISION MAKER(s):  [ ] Health Care Proxy(s)  [ ] Surrogate(s)  [ ] Guardian           Name(s): Phone Number(s):    ----- BASELINE (I)ADL(s) (prior to admission):  [place]: [ ]Total  [ ] Moderate [ ]Dependent  Palliative Performance Status Version 2:            =================================================================================================  -----MEDICATIONS AND ALLERGIES:  Allergies    No Known Allergies    Intolerances    MEDICATIONS  (STANDING):  apixaban 5 milliGRAM(s) Oral two times a day  budesonide  80 MICROgram(s)/formoterol 4.5 MICROgram(s) Inhaler 2 Puff(s) Inhalation two times a day  ciprofloxacin     Tablet 250 milliGRAM(s) Oral two times a day  dextrose 5%. 1000 milliLiter(s) (50 mL/Hr) IV Continuous <Continuous>  dextrose 50% Injectable 12.5 Gram(s) IV Push once  dextrose 50% Injectable 12.5 Gram(s) IV Push once  folic acid 1 milliGRAM(s) Oral daily  furosemide    Tablet 80 milliGRAM(s) Oral two times a day  insulin glargine Injectable (LANTUS) 8 Unit(s) SubCutaneous at bedtime  insulin lispro (HumaLOG) corrective regimen sliding scale   SubCutaneous Before meals and at bedtime  metoprolol tartrate 25 milliGRAM(s) Oral two times a day  predniSONE   Tablet 15 milliGRAM(s) Oral daily  silver sulfADIAZINE 1% Cream 1 Application(s) Topical daily  sodium bicarbonate 650 milliGRAM(s) Oral three times a day  sodium zirconium cyclosilicate 10 Gram(s) Oral daily    MEDICATIONS  (PRN):  acetaminophen   Tablet .. 650 milliGRAM(s) Oral every 6 hours PRN Temp greater or equal to 38C (100.4F), Mild Pain (1 - 3), Moderate Pain (4 - 6)  ALBUTerol    90 MICROgram(s) HFA Inhaler 2 Puff(s) Inhalation every 6 hours PRN Shortness of Breath and/or Wheezing  dextrose 40% Gel 15 Gram(s) Oral once PRN Blood Glucose LESS THAN 70 milliGRAM(s)/deciliter  dextrose 40% Gel 15 Gram(s) Oral once PRN Blood Glucose LESS THAN 70 milliGRAM(s)/deciliter  glucagon  Injectable 1 milliGRAM(s) IntraMuscular once PRN Glucose LESS THAN 70 milligrams/deciliter  midodrine. 10 milliGRAM(s) Oral <User Schedule> PRN Pre-HD for Hypotension  ondansetron Injectable 4 milliGRAM(s) IV Push every 8 hours PRN Nausea and/or Vomiting  simethicone 80 milliGRAM(s) Chew two times a day PRN Upset Stomach        =================================================================================================  ----- SYMPTOM ASSESSMENT: [ ]Unable to obtain due to poor mentation   Source if other than patient:  [ ]Family   [ ]Team     Pain (Impact on QOL):    Location -   Severity -        Minimal acceptable level (0-10 scale):  Quality:   Onset:   Duration:                 Aggravating factors -  Relieving factors -  Radiation -    PAIN AD Score:     REVIEW OF SYSTEMS (not present if not checked off):  Unable to elicit [ ]  Dyspnea: [ ]  Anxiety: [ ]  Fatigue: [ ]  Nausea: [ ]                       Loss of appetite: [ ]  Constipation: [ ]  Grief: [ ]    Other Symptoms:  [ ]All other review of systems negative       =================================================================================================  ----- PHYSICAL EXAM:  Vital Signs Last 24 Hrs  T(C): 36.8 (21 Apr 2020 04:10), Max: 36.8 (20 Apr 2020 09:26)  T(F): 98.2 (21 Apr 2020 04:10), Max: 98.3 (20 Apr 2020 09:26)  HR: 99 (21 Apr 2020 04:10) (87 - 109)  BP: 123/59 (21 Apr 2020 07:43) (122/59 - 137/56)  BP(mean): --  RR: 18 (21 Apr 2020 04:10) (18 - 18)  SpO2: 96% (21 Apr 2020 04:10) (95% - 100%) I&O's Summary    20 Apr 2020 07:01  -  21 Apr 2020 07:00  --------------------------------------------------------  IN: 0 mL / OUT: 1100 mL / NET: -1100 mL      =================================================================================================  ----- LABS:                        8.2    10.57 )-----------( 231      ( 21 Apr 2020 06:20 )             29.9   04-21    147<H>  |  103  |  113<H>  ----------------------------<  134<H>  5.1   |  25  |  3.83<H>    Ca    8.0<L>      21 Apr 2020 06:20  Phos  7.3     04-21  Mg     2.1     04-21    TPro  5.8<L>  /  Alb  2.3<L>  /  TBili  0.2  /  DBili  x   /  AST  12  /  ALT  22  /  AlkPhos  132<H>  04-21        -----RADIOLOGY & ADDITIONAL STUDIES:    PROTEIN CALORIE MALNUTRITION PRESENT: [ ] Yes [ ] No  [ ] PPSV2 < or = to 30% [ ] significant weight loss  [ ] poor nutritional intake [ ] catabolic state [ ] anasarca     Albumin, Serum: 2.3 g/dL (04-21-20 @ 06:20)

## 2020-04-20 NOTE — CHART NOTE - NSCHARTNOTEFT_GEN_A_CORE
IV access was lost. Pt is a very difficult stick. I discussed with ID pharmacist who discussed case with ID attg Dr. Peacock, pt can be switched to PO Cipro to complete 7 day course. I also discussed with nephrology - as they were planning to decrease her diuretic dose today, they suggest that the patient can be switched to PO lasix 80 BID.

## 2020-04-20 NOTE — OCCUPATIONAL THERAPY INITIAL EVALUATION ADULT - DIAGNOSIS, OT EVAL
Acute renal failure complicated by hyperkalemia; Acute on chronic diastolic heart failure; 16 cm left upper quadrant complex cystic mass likely arising from the pancreatic tail; Decreased ability to follow commands; Decreased Bilateral UE Active ROM; Decreased functional mobility; Decreased ADL management

## 2020-04-20 NOTE — OCCUPATIONAL THERAPY INITIAL EVALUATION ADULT - ADDITIONAL COMMENTS
Accuracy of Prior Functioning Status questionable secondary to pt. with noted confusion & difficulty following commands during OT evaluation.     (See continued from above) US Kidney and Bladder on 4/5/2020 displayed large indeterminate complex cystic mass in the left upper quadrant. CT Abdomen and Pelvis No Contrast on 4/8/2020 displayed indeterminate 16 cm left upper quadrant complex cystic mass likely arising from the pancreatic tail. 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 Right lower lobe calcified pleural nodule. CT Head No Contrast on 4/17/2020 displayed no mass effect, hemorrhage or evidence of acute intracranial pathology.

## 2020-04-20 NOTE — PROVIDER CONTACT NOTE (OTHER) - ACTION/TREATMENT ORDERED:
escalated to PA and manager, attempting to escalate to ultrasound  guided IV trained nurse, will monitor

## 2020-04-20 NOTE — CONSULT NOTE ADULT - ASSESSMENT
I spoke to the pt's  and son over the phone. I answered their questions and broached GOC, namely: renal failure and dialysis, code status, and hospice.    They want to talk it over right now, as the  is very distraught. The son, however, made it clear that if they opt for comfort measures/ hospice, he would want the patient HOME and not in a facility. He says they need to talk about finances and end of life issues as the pt lives with her  who is also disabled. They have our contact information and said they would reach out.     *****FULL CONSULT NOTE TO FOLLOW***** 75yo with multiple comorbid conditions, now admitted with worsening CKD s/p initiation of iHD. Palliative consulted for GOC.

## 2020-04-20 NOTE — PROGRESS NOTE ADULT - PROBLEM SELECTOR PLAN 1
-hypercarbic resp failure, today lethargic agian  -will obtain ABG today, might need to have longer hours with patient on BiPAP  -NH4, normal, infectious work up normal

## 2020-04-20 NOTE — PHYSICAL THERAPY INITIAL EVALUATION ADULT - CRITERIA FOR SKILLED THERAPEUTIC INTERVENTIONS
therapy frequency/rehab potential/anticipated discharge recommendation/predicted duration of therapy intervention/impairments found

## 2020-04-20 NOTE — OCCUPATIONAL THERAPY INITIAL EVALUATION ADULT - LEVEL OF INDEPENDENCE: SUPINE/SIT, REHAB EVAL
Not appropriate to assess secondary to pt. with noted confusion and difficulty following commands at this time. To be assessed at later date/time if and when safe and appropriate.

## 2020-04-20 NOTE — PHYSICAL THERAPY INITIAL EVALUATION ADULT - GENERAL OBSERVATIONS, REHAB EVAL
Consult received, chart reviewed. Patient received semi-supine in bed, no apparent distress. Patient agreed to Evaluation from Physical Therapist.

## 2020-04-20 NOTE — PROVIDER CONTACT NOTE (OTHER) - SITUATION
patients current IV difficult to flush, patient needs new IV, multiple attempts without success, IV nurse not on duty today

## 2020-04-20 NOTE — OCCUPATIONAL THERAPY INITIAL EVALUATION ADULT - GENERAL OBSERVATIONS, REHAB EVAL
Pt. received semisupine in bed. No acute distress. Patient agreed to evaluation from Occupational Therapist. Noted open wound to Right forearm and behind Left knee (RN aware). +Heplock, +O2 via nasal cannula, +Urethral Catheter, +Bilateral heel protectors.

## 2020-04-20 NOTE — CONSULT NOTE ADULT - PROVIDER SPECIALTY LIST ADULT
Palliative Care
Cardiology
Gastroenterology
MICU
Pulmonology
Vascular Surgery
Nephrology
Infectious Disease

## 2020-04-20 NOTE — PROGRESS NOTE ADULT - SUBJECTIVE AND OBJECTIVE BOX
CC: F/U for Bacteremia    Saw/spoke to patient. No fevers, no chills. No new complaints.    Allergies  No Known Allergies    ANTIMICROBIALS:  ciprofloxacin     Tablet 250 two times a day    PE:    Vital Signs Last 24 Hrs  T(C): 36.8 (20 Apr 2020 09:26), Max: 36.8 (20 Apr 2020 09:26)  T(F): 98.3 (20 Apr 2020 09:26), Max: 98.3 (20 Apr 2020 09:26)  HR: 104 (20 Apr 2020 09:26) (98 - 139)  BP: 129/50 (20 Apr 2020 09:26) (101/60 - 129/50)  RR: 18 (20 Apr 2020 09:26) (18 - 18)  SpO2: 100% (20 Apr 2020 09:26) (96% - 100%)    Gen: AOx3, NAD, fatigued, ill appearing  CV: S1+S2 normal, tachycardic  Resp: Clear bilat, no resp distress, no crackles/wheezes  Abd: Soft, nontender, +BS  Ext: No LE edema, no wounds    LABS:                        7.7    10.63 )-----------( 210      ( 19 Apr 2020 10:23 )             26.8     04-20    148<H>  |  104  |  112<H>  ----------------------------<  155<H>  4.9   |  23  |  3.89<H>    Ca    7.9<L>      20 Apr 2020 06:26  Phos  6.9     04-20  Mg     2.1     04-20    TPro  5.6<L>  /  Alb  2.0<L>  /  TBili  0.2  /  DBili  x   /  AST  11  /  ALT  18  /  AlkPhos  129<H>  04-20    MICROBIOLOGY:    .Blood Blood-Peripheral aerobic  04-12-20   No Growth Final     .Blood Blood-Peripheral anaerobic  04-12-20   No Growth Final     .Blood Blood  04-08-20   Growth in aerobic bottle: Escherichia coli  See previous culture [id number]  --    Growth in aerobic bottle: Gram Negative Rods    .Blood Blood-Venous  04-05-20   Growth in aerobic and anaerobic bottles: Escherichia coli  See previous culture [id number]  --    Growth in aerobic bottle: Gram Negative Rods  Growth in anaerobic bottle: Gram Negative Rods    .Blood Blood-Peripheral  04-05-20   Growth in aerobic and anaerobic bottles: Escherichia coli    (otherwise reviewed)    RADIOLOGY:    4/17 CTH:    FINDINGS:    VENTRICLES AND SULCI:  Prominent in size compatible with age-appropriate volume loss    INTRA-AXIAL:  Areas of white matter hypodensity are seen within the cerebral hemispheres bilaterally compatible with mild microvascular-type changes. No mass, blood or abnormal attenuation is otherwise seen.    EXTRA-AXIAL:  No mass or collection is seen.    VISUALIZED SINUSES:  Clear.    VISUALIZED MASTOIDS:  Nonspecific soft tissue bilaterally right greater than left.    CALVARIUM:  Normal.    MISCELLANEOUS:  The sella turcica is enlarged and CSF filled. The pituitary gland is flattened along the sellar floor compatible with an empty sella.    IMPRESSION:    No mass effect, hemorrhage or evidence of acute intracranial pathology.

## 2020-04-20 NOTE — OCCUPATIONAL THERAPY INITIAL EVALUATION ADULT - MD ORDER
Occupational Therapy (OT) to evaluate and treat. Per PANCHO Garcia, pt is okay to participate in OT evaluation and perform activity as tolerated.

## 2020-04-20 NOTE — CONSULT NOTE ADULT - PROBLEM SELECTOR RECOMMENDATION 9
.  Multifactorial: Bacteremia, Azotemia    > Continue clear communication as you are with the patient  > Meds reviewed. Avoid CNS agents.

## 2020-04-20 NOTE — PROGRESS NOTE ADULT - ATTENDING COMMENTS
Patient seems somewhat improved today from respiratory and mental status.  continue present treatment.  for hypernatremia recommend increase po intake if possible.

## 2020-04-20 NOTE — PROGRESS NOTE ADULT - SUBJECTIVE AND OBJECTIVE BOX
[facility] DIVISION OF KIDNEY DISEASES AND HYPERTENSION -- FOLLOW UP NOTE  --------------------------------------------------------------------------------  HPI: 75 yo F with RA c/b chronic bronchiectasis, HTN, and DM2 is admitted with SOB. Nephrology team is consulted for NBA and hyperkalemia. On admission (4/4/20), Scr was elevated to 2.82, which as worsened to 3.31 on 4/5/20. Patient required urgent HD on 4/6/20. Last HD session was on 4/14/20. Patient had expressed previously she would not want HD long term. Pt started on IV diuretics. Scr remains elevated but stable at 3.89 today.    Patient evaluated at bedside, in no acute distress. Smiling when addressed.     PAST HISTORY  --------------------------------------------------------------------------------  No significant changes to PMH, PSH, FHx, SHx, unless otherwise noted    ALLERGIES & MEDICATIONS  --------------------------------------------------------------------------------  Allergies    No Known Allergies    Intolerances    Standing Inpatient Medications  apixaban 5 milliGRAM(s) Oral two times a day  budesonide  80 MICROgram(s)/formoterol 4.5 MICROgram(s) Inhaler 2 Puff(s) Inhalation two times a day  buMETAnide Injectable 2 milliGRAM(s) IV Push two times a day  cefTRIAXone   IVPB 2000 milliGRAM(s) IV Intermittent every 24 hours  dextrose 5%. 1000 milliLiter(s) IV Continuous <Continuous>  dextrose 50% Injectable 12.5 Gram(s) IV Push once  dextrose 50% Injectable 12.5 Gram(s) IV Push once  folic acid 1 milliGRAM(s) Oral daily  insulin glargine Injectable (LANTUS) 8 Unit(s) SubCutaneous at bedtime  insulin lispro (HumaLOG) corrective regimen sliding scale   SubCutaneous Before meals and at bedtime  metoprolol tartrate 25 milliGRAM(s) Oral two times a day  predniSONE   Tablet 15 milliGRAM(s) Oral daily  silver sulfADIAZINE 1% Cream 1 Application(s) Topical daily  sodium zirconium cyclosilicate 10 Gram(s) Oral daily    REVIEW OF SYSTEMS  --------------------------------------------------------------------------------  Unable to obtain    All other systems were reviewed and are negative, except as noted.    VITALS/PHYSICAL EXAM  --------------------------------------------------------------------------------  T(C): 36.6 (04-20-20 @ 05:46), Max: 36.9 (04-19-20 @ 10:14)  HR: 110 (04-20-20 @ 08:41) (98 - 139)  BP: 101/60 (04-20-20 @ 05:46) (101/60 - 123/65)  RR: 18 (04-20-20 @ 05:46) (18 - 18)  SpO2: 97% (04-20-20 @ 08:41) (96% - 98%)  Wt(kg): --    04-19-20 @ 07:01  -  04-20-20 @ 07:00  --------------------------------------------------------  IN: 100 mL / OUT: 560 mL / NET: -460 mL    Physical Exam:  	Gen: drowsy, but  responding, on nasal cannula  	HEENT: supple neck  	Pulm: fair air entry, diminished breath sounds  	CV: RRR, S1S2  	Abd: +BS, soft  	: + Cevallos catheter   	LE: (+) bilateral edema  	Skin: Right dorsal foot wound, wound on right forearm	    LABS/STUDIES  --------------------------------------------------------------------------------              7.7    10.63 >-----------<  210      [04-19-20 @ 10:23]              26.8     148  |  104  |  112  ----------------------------<  155      [04-20-20 @ 06:26]  4.9   |  23  |  3.89        Ca     7.9     [04-20-20 @ 06:26]      Mg     2.1     [04-20-20 @ 06:26]      Phos  6.9     [04-20-20 @ 06:26]    TPro  5.6  /  Alb  2.0  /  TBili  0.2  /  DBili  x   /  AST  11  /  ALT  18  /  AlkPhos  129  [04-20-20 @ 06:26]    Creatinine Trend:  SCr 3.89 [04-20 @ 06:26]  SCr 3.75 [04-19 @ 10:23]  SCr 3.63 [04-18 @ 03:00]  SCr 3.41 [04-17 @ 05:54]  SCr 3.37 [04-16 @ 21:19]    Urinalysis - [04-04-20 @ 21:00]      Color YELLOW / Appearance CLEAR / SG 1.019 / pH 6.0      Gluc NEGATIVE / Ketone NEGATIVE  / Bili NEGATIVE / Urobili 0.2       Blood NEGATIVE / Protein MODERATE / Leuk Est SMALL / Nitrite NEGATIVE      RBC NONE / WBC 2-5 / Hyaline  / Gran  / Sq Epi  / Non Sq Epi FEW / Bacteria MODERATE    Iron 24, TIBC 135, %sat --      [04-14-20 @ 06:00]  Ferritin 1068      [04-14-20 @ 06:00]  HbA1c 6.4      [04-05-20 @ 06:20]  TSH 2.98      [04-14-20 @ 06:00]    HBsAg NEGATIVE      [04-05-20 @ 12:00]  HCV 0.21, Nonreactive Hepatitis C AB

## 2020-04-20 NOTE — CONSULT NOTE ADULT - PROBLEM SELECTOR RECOMMENDATION 6
Thank you for allowing us to participate in your patient's care. We will continue to follow with you. Please page [phone] for any q's or c's.     German Doyle  Palliative Medicine

## 2020-04-21 DIAGNOSIS — E87.2 ACIDOSIS: ICD-10-CM

## 2020-04-21 DIAGNOSIS — G93.49 OTHER ENCEPHALOPATHY: ICD-10-CM

## 2020-04-21 DIAGNOSIS — Z51.5 ENCOUNTER FOR PALLIATIVE CARE: ICD-10-CM

## 2020-04-21 DIAGNOSIS — Z71.89 OTHER SPECIFIED COUNSELING: ICD-10-CM

## 2020-04-21 LAB
ALBUMIN SERPL ELPH-MCNC: 2.3 G/DL — LOW (ref 3.3–5)
ALP SERPL-CCNC: 132 U/L — HIGH (ref 40–120)
ALT FLD-CCNC: 22 U/L — SIGNIFICANT CHANGE UP (ref 4–33)
ANION GAP SERPL CALC-SCNC: 19 MMO/L — HIGH (ref 7–14)
AST SERPL-CCNC: 12 U/L — SIGNIFICANT CHANGE UP (ref 4–32)
BASE EXCESS BLDA CALC-SCNC: 2.7 MMOL/L — SIGNIFICANT CHANGE UP
BILIRUB SERPL-MCNC: 0.2 MG/DL — SIGNIFICANT CHANGE UP (ref 0.2–1.2)
BLOOD GAS ARTERIAL - FIO2: 0 — SIGNIFICANT CHANGE UP
BUN SERPL-MCNC: 113 MG/DL — HIGH (ref 7–23)
CALCIUM SERPL-MCNC: 8 MG/DL — LOW (ref 8.4–10.5)
CHLORIDE SERPL-SCNC: 103 MMOL/L — SIGNIFICANT CHANGE UP (ref 98–107)
CO2 SERPL-SCNC: 25 MMOL/L — SIGNIFICANT CHANGE UP (ref 22–31)
CREAT SERPL-MCNC: 3.83 MG/DL — HIGH (ref 0.5–1.3)
GLUCOSE BLDC GLUCOMTR-MCNC: 142 MG/DL — HIGH (ref 70–99)
GLUCOSE BLDC GLUCOMTR-MCNC: 147 MG/DL — HIGH (ref 70–99)
GLUCOSE BLDC GLUCOMTR-MCNC: 169 MG/DL — HIGH (ref 70–99)
GLUCOSE BLDC GLUCOMTR-MCNC: 181 MG/DL — HIGH (ref 70–99)
GLUCOSE SERPL-MCNC: 134 MG/DL — HIGH (ref 70–99)
HCO3 BLDA-SCNC: 26 MMOL/L — SIGNIFICANT CHANGE UP (ref 22–26)
HCT VFR BLD CALC: 29.9 % — LOW (ref 34.5–45)
HGB BLD-MCNC: 8.2 G/DL — LOW (ref 11.5–15.5)
MAGNESIUM SERPL-MCNC: 2.1 MG/DL — SIGNIFICANT CHANGE UP (ref 1.6–2.6)
MCHC RBC-ENTMCNC: 27.4 % — LOW (ref 32–36)
MCHC RBC-ENTMCNC: 28 PG — SIGNIFICANT CHANGE UP (ref 27–34)
MCV RBC AUTO: 102 FL — HIGH (ref 80–100)
NRBC # FLD: 0.18 K/UL — SIGNIFICANT CHANGE UP (ref 0–0)
NRBC FLD-RTO: 1.7 — SIGNIFICANT CHANGE UP
PCO2 BLDA: 61 MMHG — HIGH (ref 32–48)
PH BLDA: 7.29 PH — LOW (ref 7.35–7.45)
PHOSPHATE SERPL-MCNC: 7.3 MG/DL — HIGH (ref 2.5–4.5)
PLATELET # BLD AUTO: 231 K/UL — SIGNIFICANT CHANGE UP (ref 150–400)
PMV BLD: 11.7 FL — SIGNIFICANT CHANGE UP (ref 7–13)
PO2 BLDA: 161 MMHG — HIGH (ref 83–108)
POTASSIUM SERPL-MCNC: 5.1 MMOL/L — SIGNIFICANT CHANGE UP (ref 3.5–5.3)
POTASSIUM SERPL-SCNC: 5.1 MMOL/L — SIGNIFICANT CHANGE UP (ref 3.5–5.3)
PROT SERPL-MCNC: 5.8 G/DL — LOW (ref 6–8.3)
RBC # BLD: 2.93 M/UL — LOW (ref 3.8–5.2)
RBC # FLD: 17.4 % — HIGH (ref 10.3–14.5)
SAO2 % BLDA: 98.6 % — SIGNIFICANT CHANGE UP (ref 95–99)
SODIUM SERPL-SCNC: 147 MMOL/L — HIGH (ref 135–145)
WBC # BLD: 10.57 K/UL — HIGH (ref 3.8–10.5)
WBC # FLD AUTO: 10.57 K/UL — HIGH (ref 3.8–10.5)

## 2020-04-21 PROCEDURE — 99233 SBSQ HOSP IP/OBS HIGH 50: CPT | Mod: GC

## 2020-04-21 PROCEDURE — 99233 SBSQ HOSP IP/OBS HIGH 50: CPT

## 2020-04-21 PROCEDURE — 99232 SBSQ HOSP IP/OBS MODERATE 35: CPT | Mod: CS

## 2020-04-21 PROCEDURE — 99497 ADVNCD CARE PLAN 30 MIN: CPT | Mod: 25

## 2020-04-21 PROCEDURE — 99232 SBSQ HOSP IP/OBS MODERATE 35: CPT

## 2020-04-21 RX ADMIN — APIXABAN 5 MILLIGRAM(S): 2.5 TABLET, FILM COATED ORAL at 18:01

## 2020-04-21 RX ADMIN — Medication 1 MILLIGRAM(S): at 11:55

## 2020-04-21 RX ADMIN — Medication 80 MILLIGRAM(S): at 07:45

## 2020-04-21 RX ADMIN — INSULIN GLARGINE 8 UNIT(S): 100 INJECTION, SOLUTION SUBCUTANEOUS at 21:43

## 2020-04-21 RX ADMIN — Medication 15 MILLIGRAM(S): at 05:19

## 2020-04-21 RX ADMIN — Medication 1: at 12:13

## 2020-04-21 RX ADMIN — Medication 1 APPLICATION(S): at 11:56

## 2020-04-21 RX ADMIN — BUDESONIDE AND FORMOTEROL FUMARATE DIHYDRATE 2 PUFF(S): 160; 4.5 AEROSOL RESPIRATORY (INHALATION) at 21:43

## 2020-04-21 RX ADMIN — APIXABAN 5 MILLIGRAM(S): 2.5 TABLET, FILM COATED ORAL at 07:44

## 2020-04-21 RX ADMIN — Medication 25 MILLIGRAM(S): at 18:00

## 2020-04-21 RX ADMIN — Medication 250 MILLIGRAM(S): at 05:18

## 2020-04-21 RX ADMIN — Medication 250 MILLIGRAM(S): at 18:01

## 2020-04-21 RX ADMIN — Medication 650 MILLIGRAM(S): at 21:43

## 2020-04-21 RX ADMIN — Medication 80 MILLIGRAM(S): at 18:00

## 2020-04-21 RX ADMIN — Medication 1: at 18:01

## 2020-04-21 RX ADMIN — Medication 650 MILLIGRAM(S): at 11:57

## 2020-04-21 RX ADMIN — Medication 650 MILLIGRAM(S): at 05:21

## 2020-04-21 RX ADMIN — BUDESONIDE AND FORMOTEROL FUMARATE DIHYDRATE 2 PUFF(S): 160; 4.5 AEROSOL RESPIRATORY (INHALATION) at 08:49

## 2020-04-21 RX ADMIN — Medication 25 MILLIGRAM(S): at 07:45

## 2020-04-21 RX ADMIN — SODIUM ZIRCONIUM CYCLOSILICATE 10 GRAM(S): 10 POWDER, FOR SUSPENSION ORAL at 15:15

## 2020-04-21 NOTE — PROGRESS NOTE ADULT - PROBLEM SELECTOR PLAN 2
Pt. with likely NBA in the setting of infection, low BP, and decreased oral intake. Pt. with probable CKD in the setting of long-standing history of DM and HTN. Pt. with normal Scr of 0.96 on 6/7/16. SCr remains elevated but stable at 3.83 today. GOC appreciated. Continue PO Furosemide. Monitor labs, monitor urine output.

## 2020-04-21 NOTE — PROGRESS NOTE ADULT - PROBLEM SELECTOR PLAN 1
-hypercarbic resp failure, lethargic   -pt with both respiratory and metabolic acidosis  -on BiPAP at night for resp acidosis, sodium bicarb for metabolic acidosis  -NH4, normal, infectious work up normal

## 2020-04-21 NOTE — PROGRESS NOTE ADULT - PROBLEM SELECTOR PLAN 2
.  > 4/21: Family opting not to proceed with renal replacement therapy  > Would continue NaHCO3 and Lokelma as temporizing interventions until a hospice plan is set

## 2020-04-21 NOTE — PROGRESS NOTE ADULT - PROBLEM SELECTOR PLAN 4
.  # Code: FULL  # HCP: Guanakito samaniego) @ [phone]    > No dialysis  > General plan is to discharge patient home with hospice  > Hospice meeting today  > Pending issues: HHA availability and hours + delivery of medical equipment    Due to visitation restrictions in the hospital in light of COVID pandemic, all discussions with the patient/ patient's healthcare proxy or surrogate have been done via telehealth. This is to prevent spread of infection within the hospital and out in the community. Total time discussing advance care planning, goals of care discussions, and discussions about code status and hospice = 30 mins

## 2020-04-21 NOTE — PROGRESS NOTE ADULT - PROBLEM SELECTOR PLAN 10
PT eval  [facility] discussion with palliative, given pt has verbally repeated she is interested in comfort measures when she had normal mental status  No family pursuing home hospice; ongoing discussion. Still full code.

## 2020-04-21 NOTE — PROGRESS NOTE ADULT - SUBJECTIVE AND OBJECTIVE BOX
DIOR SUTTON  74y  Female      Patient is a 74y old  Female who presents with a chief complaint of LE swelling (21 Apr 2020 11:01)      INTERVAL HPI/OVERNIGHT EVENTS:  Pt feels ok. However, pt still lethargic. Denies any pain, fevers.     Medications:  acetaminophen   Tablet .. 650 milliGRAM(s) Oral every 6 hours PRN  ALBUTerol    90 MICROgram(s) HFA Inhaler 2 Puff(s) Inhalation every 6 hours PRN  apixaban 5 milliGRAM(s) Oral two times a day  budesonide  80 MICROgram(s)/formoterol 4.5 MICROgram(s) Inhaler 2 Puff(s) Inhalation two times a day  ciprofloxacin     Tablet 250 milliGRAM(s) Oral two times a day  dextrose 40% Gel 15 Gram(s) Oral once PRN  dextrose 40% Gel 15 Gram(s) Oral once PRN  dextrose 5%. 1000 milliLiter(s) IV Continuous <Continuous>  dextrose 50% Injectable 12.5 Gram(s) IV Push once  dextrose 50% Injectable 12.5 Gram(s) IV Push once  folic acid 1 milliGRAM(s) Oral daily  furosemide    Tablet 80 milliGRAM(s) Oral two times a day  glucagon  Injectable 1 milliGRAM(s) IntraMuscular once PRN  insulin glargine Injectable (LANTUS) 8 Unit(s) SubCutaneous at bedtime  insulin lispro (HumaLOG) corrective regimen sliding scale   SubCutaneous Before meals and at bedtime  metoprolol tartrate 25 milliGRAM(s) Oral two times a day  midodrine. 10 milliGRAM(s) Oral <User Schedule> PRN  ondansetron Injectable 4 milliGRAM(s) IV Push every 8 hours PRN  predniSONE   Tablet 15 milliGRAM(s) Oral daily  silver sulfADIAZINE 1% Cream 1 Application(s) Topical daily  simethicone 80 milliGRAM(s) Chew two times a day PRN  sodium bicarbonate 650 milliGRAM(s) Oral three times a day  sodium zirconium cyclosilicate 10 Gram(s) Oral daily      REVIEW OF SYSTEMS:  limited ROS given pt lethargic     T(C): 36.9 (04-21-20 @ 11:55), Max: 36.9 (04-21-20 @ 11:55)  HR: 99 (04-21-20 @ 11:55) (87 - 109)  BP: 133/52 (04-21-20 @ 11:55) (122/59 - 137/56)  RR: 18 (04-21-20 @ 11:55) (18 - 18)  SpO2: 98% (04-21-20 @ 11:55) (95% - 100%)  Wt(kg): --Vital Signs Last 24 Hrs  T(C): 36.9 (21 Apr 2020 11:55), Max: 36.9 (21 Apr 2020 11:55)  T(F): 98.4 (21 Apr 2020 11:55), Max: 98.4 (21 Apr 2020 11:55)  HR: 99 (21 Apr 2020 11:55) (87 - 109)  BP: 133/52 (21 Apr 2020 11:55) (122/59 - 137/56)  BP(mean): 71 (21 Apr 2020 11:55) (71 - 71)  RR: 18 (21 Apr 2020 11:55) (18 - 18)  SpO2: 98% (21 Apr 2020 11:55) (95% - 100%)    PHYSICAL EXAM:  CONSTITUTIONAL: NAD, obese  ENMT: Moist oral mucosa, no pharyngeal injection or exudates; normal dentition  RESPIRATORY: Normal respiratory effort; lungs clear, but low breath sounds  CARDIOVASCULAR: RRR, no m,r,g; anasarca   ABDOMEN: Nontender to palpation, normoactive bowel sounds, no rebound/guarding; No hepatosplenomegaly  PSYCH: A+O X 1  SKIN: superficial hematoma, abrasions of skin consistent with long term prednisone use     Consultant(s) Notes Reviewed:  [x ] YES  [ ] NO  Care Discussed with Consultants/Other Providers [ x] YES  [ ] NO    LABS:                        8.2    10.57 )-----------( 231      ( 21 Apr 2020 06:20 )             29.9     04-21    147<H>  |  103  |  113<H>  ----------------------------<  134<H>  5.1   |  25  |  3.83<H>    Ca    8.0<L>      21 Apr 2020 06:20  Phos  7.3     04-21  Mg     2.1     04-21    TPro  5.8<L>  /  Alb  2.3<L>  /  TBili  0.2  /  DBili  x   /  AST  12  /  ALT  22  /  AlkPhos  132<H>  04-21        CAPILLARY BLOOD GLUCOSE      POCT Blood Glucose.: 181 mg/dL (21 Apr 2020 12:06)  POCT Blood Glucose.: 147 mg/dL (21 Apr 2020 08:18)  POCT Blood Glucose.: 185 mg/dL (20 Apr 2020 21:32)  POCT Blood Glucose.: 188 mg/dL (20 Apr 2020 17:14)  POCT Blood Glucose.: 182 mg/dL (20 Apr 2020 13:05)      ABG - ( 21 Apr 2020 09:56 )  pH, Arterial: 7.29  pH, Blood: x     /  pCO2: 61    /  pO2: 161   / HCO3: 26    / Base Excess: 2.7   /  SaO2: 98.6                  RADIOLOGY & ADDITIONAL TESTS:    Imaging Personally Reviewed:  [ ] YES  [ ] NO    HEALTH ISSUES - PROBLEM Dx:  Acidosis: Acidosis  Palliative care encounter: Palliative care encounter  Advance care planning: Advance care planning  Other encephalopathy: Other encephalopathy  Afib: Afib  Metabolic encephalopathy: Metabolic encephalopathy  Chronic obstructive pulmonary disease, unspecified COPD type: Chronic obstructive pulmonary disease, unspecified COPD type  Pancreatic mass: Pancreatic mass  Anemia due to blood loss: Anemia due to blood loss  Hypertension, unspecified type: Hypertension, unspecified type  E coli bacteremia: E coli bacteremia  Rheumatoid arthritis of hip, unspecified laterality, unspecified rheumatoid factor presence: Rheumatoid arthritis of hip, unspecified laterality, unspecified rheumatoid factor presence  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 complication: 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 complication  Acute renal failure, unspecified acute renal failure type: Acute renal failure, unspecified acute renal failure type  Renal failure, unspecified chronicity: Renal failure, unspecified chronicity  Rheumatoid arthritis: Rheumatoid arthritis  SIRS (systemic inflammatory response syndrome): SIRS (systemic inflammatory response syndrome)  Discharge planning issues: Discharge planning issues  Prophylactic measure: Prophylactic measure  Bronchiectasis: Bronchiectasis  Hypertension: Hypertension  Type 2 diabetes mellitus: Type 2 diabetes mellitus  Acute on chronic diastolic (congestive) heart failure: Acute on chronic diastolic (congestive) heart failure  Hyperkalemia: Hyperkalemia  Acute renal failure: Acute renal failure

## 2020-04-21 NOTE — PROGRESS NOTE ADULT - SUBJECTIVE AND OBJECTIVE BOX
CC: F/U for Bacteremia    Saw/spoke to patient. No fevers, no chills. No new complaints. Unchanged.    Allergies  No Known Allergies    ANTIMICROBIALS:  ciprofloxacin     Tablet 250 two times a day    PE:    Vital Signs Last 24 Hrs  T(C): 36.9 (21 Apr 2020 11:55), Max: 36.9 (21 Apr 2020 11:55)  T(F): 98.4 (21 Apr 2020 11:55), Max: 98.4 (21 Apr 2020 11:55)  HR: 99 (21 Apr 2020 11:55) (87 - 109)  BP: 133/52 (21 Apr 2020 11:55) (122/59 - 137/56)  BP(mean): 71 (21 Apr 2020 11:55) (71 - 71)  RR: 18 (21 Apr 2020 11:55) (18 - 18)  SpO2: 98% (21 Apr 2020 11:55) (95% - 100%)    Gen: AOx3, NAD, non-toxic  CV: S1+S2 normal, nontachycardic  Resp: Clear bilat, no resp distress, no crackles/wheezes  Abd: Soft, nontender, +BS  Ext: No LE edema, no wounds    LABS:                        8.2    10.57 )-----------( 231      ( 21 Apr 2020 06:20 )             29.9     04-21    147<H>  |  103  |  113<H>  ----------------------------<  134<H>  5.1   |  25  |  3.83<H>    Ca    8.0<L>      21 Apr 2020 06:20  Phos  7.3     04-21  Mg     2.1     04-21    TPro  5.8<L>  /  Alb  2.3<L>  /  TBili  0.2  /  DBili  x   /  AST  12  /  ALT  22  /  AlkPhos  132<H>  04-21    MICROBIOLOGY:    .Blood Blood-Peripheral aerobic  04-12-20   No Growth Final    .Blood Blood-Peripheral anaerobic  04-12-20   No Growth Final     .Blood Blood  04-08-20   Growth in aerobic bottle: Escherichia coli  See previous culture [id number]  --    Growth in aerobic bottle: Gram Negative Rods    (otherwise reviewed)    RADIOLOGY:    4/10 XR:     FINDINGS:      Technically very limited study. Made and upper abdomen is only partially included within the field-of-view. No abnormal bowel distention.    IMPRESSION:     Very limited study demonstrates no abnormal bowel distention.

## 2020-04-21 NOTE — PROGRESS NOTE ADULT - PROBLEM SELECTOR PLAN 7
Renal follow up appreciated, for supportive care, not a candidate for HD and pt in the past expressively stated she was not interested in this.  sodium bicarb for metabolic acidosis  lokelma for hyperkalemia  lasix for volume overload

## 2020-04-21 NOTE — PROGRESS NOTE ADULT - ASSESSMENT
Spoke with patient's son and  over the phone. In summary:    - They are opting NOT to proceed with HD  - They are planning for home with hospice, pending a hospice meeting to consolidate a safe discharge plan    *****FULL CONSULT NOTE TO FOLLOW***** Patient is a 74 y.o F w/ PMH HLD, HTN, insulin dependent T2DM, RA (on chronic prednisone, leflunomide, and hydroxychloroquine) c/b bronchiectasis (on home O2 4 L NC) who presents with advanced CKD s/p initiation of iHD. Palliative consulted for GOC.

## 2020-04-21 NOTE — PROGRESS NOTE ADULT - SUBJECTIVE AND OBJECTIVE BOX
HPI:  Patient is a 74 y.o F w/ PMH HLD, HTN, insulin dependent T2DM, RA (on chronic prednisone, leflunomide, and hydroxychloroquine) c/b bronchiectasis (on home O2 4 L NC) who presents with advanced CKD s/p initiation of iHD. Palliative consulted for GOC.      4/21/20  - This morning, I spoke to the patient's son (Guanakito) and  (Javid Jimenez) over the phone.   - In summary:    1. They are opting not to proceed with HD  2. All discussions were focused on brining the patient home with hospice. We emphasized the importance of a safe discharge plan as the patient's  is disabled  3. Guanakito was willing to take time off to move in with his parents to help care for his mother  4. Guanakito wants to learn more about HHA availability and hours (the patient had been getting HHA services prior to admission)    - I offered to contact our Hospice services and raise Guanakito's questions/ concerns with them.     =================================================================================================  ----- PERTINENT PMH/ SXH/ FHX  Hyperlipidemia  Bronchiectasis  Type 2 diabetes mellitus  Hypertension  Rheumatoid arthritis    No significant past surgical history    FAMILY HISTORY:  No pertinent family history in first degree relatives      ----- SOCIAL HISTORY:   [ ] Unable to elicit  Significant other/partner: Yes [X ]  No [ ] Children:  Yes [X ]  No [ ] Rastafarian/Spirituality:  Substance hx: Yes[ ]  No [ ]   Tobacco hx:  Yes [ ] No [ ]   Alcohol hx: Yes [ ] No [ ]   Home Opioid hx:  [ ] I-Stop Reference No:  Living Situation: [ ]Home  [ ]Long term care  [ ]Rehab [ ]Other    ----- ADVANCE DIRECTIVES:    DNR  MOLST  [ ]  Living Will  [ ]   DECISION MAKER(s):  [ ] Health Care Proxy(s)  [ ] Surrogate(s)  [ ] Guardian           Name(s): Phone Number(s):    ----- BASELINE (I)ADL(s) (prior to admission):  Martin: [ ]Total  [ ] Moderate [ ]Dependent  Palliative Performance Status Version 2:            =================================================================================================  -----MEDICATIONS AND ALLERGIES:  Allergies    No Known Allergies    Intolerances    MEDICATIONS  (STANDING):  apixaban 5 milliGRAM(s) Oral two times a day  budesonide  80 MICROgram(s)/formoterol 4.5 MICROgram(s) Inhaler 2 Puff(s) Inhalation two times a day  ciprofloxacin     Tablet 250 milliGRAM(s) Oral two times a day  dextrose 5%. 1000 milliLiter(s) (50 mL/Hr) IV Continuous <Continuous>  dextrose 50% Injectable 12.5 Gram(s) IV Push once  dextrose 50% Injectable 12.5 Gram(s) IV Push once  folic acid 1 milliGRAM(s) Oral daily  furosemide    Tablet 80 milliGRAM(s) Oral two times a day  insulin glargine Injectable (LANTUS) 8 Unit(s) SubCutaneous at bedtime  insulin lispro (HumaLOG) corrective regimen sliding scale   SubCutaneous Before meals and at bedtime  metoprolol tartrate 25 milliGRAM(s) Oral two times a day  predniSONE   Tablet 15 milliGRAM(s) Oral daily  silver sulfADIAZINE 1% Cream 1 Application(s) Topical daily  sodium bicarbonate 650 milliGRAM(s) Oral three times a day  sodium zirconium cyclosilicate 10 Gram(s) Oral daily    MEDICATIONS  (PRN):  acetaminophen   Tablet .. 650 milliGRAM(s) Oral every 6 hours PRN Temp greater or equal to 38C (100.4F), Mild Pain (1 - 3), Moderate Pain (4 - 6)  ALBUTerol    90 MICROgram(s) HFA Inhaler 2 Puff(s) Inhalation every 6 hours PRN Shortness of Breath and/or Wheezing  dextrose 40% Gel 15 Gram(s) Oral once PRN Blood Glucose LESS THAN 70 milliGRAM(s)/deciliter  dextrose 40% Gel 15 Gram(s) Oral once PRN Blood Glucose LESS THAN 70 milliGRAM(s)/deciliter  glucagon  Injectable 1 milliGRAM(s) IntraMuscular once PRN Glucose LESS THAN 70 milligrams/deciliter  midodrine. 10 milliGRAM(s) Oral <User Schedule> PRN Pre-HD for Hypotension  ondansetron Injectable 4 milliGRAM(s) IV Push every 8 hours PRN Nausea and/or Vomiting  simethicone 80 milliGRAM(s) Chew two times a day PRN Upset Stomach        =================================================================================================  ----- SYMPTOM ASSESSMENT: [ ]Unable to obtain due to poor mentation   Source if other than patient:  [ ]Family   [ ]Team     Pain (Impact on QOL):    Location -   Severity -        Minimal acceptable level (0-10 scale):  Quality:   Onset:   Duration:                 Aggravating factors -  Relieving factors -  Radiation -    PAIN AD Score:     REVIEW OF SYSTEMS (not present if not checked off):  Unable to elicit [ ]  Dyspnea: [ ]  Anxiety: [ ]  Fatigue: [ ]  Nausea: [ ]                       Loss of appetite: [ ]  Constipation: [ ]  Grief: [ ]    Other Symptoms:  [ ]All other review of systems negative       =================================================================================================  ----- PHYSICAL EXAM:  Vital Signs Last 24 Hrs  T(C): 36.9 (21 Apr 2020 11:55), Max: 36.9 (21 Apr 2020 11:55)  T(F): 98.4 (21 Apr 2020 11:55), Max: 98.4 (21 Apr 2020 11:55)  HR: 99 (21 Apr 2020 11:55) (87 - 109)  BP: 133/52 (21 Apr 2020 11:55) (122/59 - 137/56)  BP(mean): 71 (21 Apr 2020 11:55) (71 - 71)  RR: 18 (21 Apr 2020 11:55) (18 - 18)  SpO2: 98% (21 Apr 2020 11:55) (95% - 100%)      =================================================================================================  ----- LABS:  04-21    147<H>  |  103  |  113<H>  ----------------------------<  134<H>  5.1   |  25  |  3.83<H>    Ca    8.0<L>      21 Apr 2020 06:20  Phos  7.3     04-21  Mg     2.1     04-21    TPro  5.8<L>  /  Alb  2.3<L>  /  TBili  0.2  /  DBili  x   /  AST  12  /  ALT  22  /  AlkPhos  132<H>  04-21

## 2020-04-21 NOTE — PROVIDER CONTACT NOTE (OTHER) - SITUATION
patients current IV difficult to flush, patient needs new IV, multiple attempts without success, IV nurse not on duty at this time.

## 2020-04-21 NOTE — PROGRESS NOTE ADULT - SUBJECTIVE AND OBJECTIVE BOX
[facility] DIVISION OF KIDNEY DISEASES AND HYPERTENSION -- FOLLOW UP NOTE  --------------------------------------------------------------------------------  HPI: 75 yo F with RA c/b chronic bronchiectasis, HTN, and DM2 is admitted with SOB. Nephrology team is consulted for NBA and hyperkalemia. On admission (4/4/20), Scr was elevated to 2.82, which as worsened to 3.31 on 4/5/20. Patient required urgent HD on 4/6/20. Last HD session was on 4/14/20. Patient had expressed previously she would not want HD long term. Pt initialyl on IV diuretics but switched to PO yesterday (4/20/20) with good urine output. Scr remains elevated but stable at 3.83 today.    Patient evaluated at bedside, in no acute distress. Lethargic during exam.    PAST HISTORY  --------------------------------------------------------------------------------  No significant changes to PMH, PSH, FHx, SHx, unless otherwise noted    ALLERGIES & MEDICATIONS  --------------------------------------------------------------------------------  Allergies    No Known Allergies    Intolerances    Standing Inpatient Medications  apixaban 5 milliGRAM(s) Oral two times a day  budesonide  80 MICROgram(s)/formoterol 4.5 MICROgram(s) Inhaler 2 Puff(s) Inhalation two times a day  ciprofloxacin     Tablet 250 milliGRAM(s) Oral two times a day  dextrose 5%. 1000 milliLiter(s) IV Continuous <Continuous>  dextrose 50% Injectable 12.5 Gram(s) IV Push once  dextrose 50% Injectable 12.5 Gram(s) IV Push once  folic acid 1 milliGRAM(s) Oral daily  furosemide    Tablet 80 milliGRAM(s) Oral two times a day  insulin glargine Injectable (LANTUS) 8 Unit(s) SubCutaneous at bedtime  insulin lispro (HumaLOG) corrective regimen sliding scale   SubCutaneous Before meals and at bedtime  metoprolol tartrate 25 milliGRAM(s) Oral two times a day  predniSONE   Tablet 15 milliGRAM(s) Oral daily  silver sulfADIAZINE 1% Cream 1 Application(s) Topical daily  sodium bicarbonate 650 milliGRAM(s) Oral three times a day  sodium zirconium cyclosilicate 10 Gram(s) Oral daily    REVIEW OF SYSTEMS  --------------------------------------------------------------------------------  Unable to obtain due to medical condition    All other systems were reviewed and are negative, except as noted.    VITALS/PHYSICAL EXAM  --------------------------------------------------------------------------------  T(C): 36.8 (04-21-20 @ 04:10), Max: 36.8 (04-20-20 @ 09:26)  HR: 99 (04-21-20 @ 04:10) (87 - 109)  BP: 123/59 (04-21-20 @ 07:43) (122/59 - 137/56)  RR: 18 (04-21-20 @ 04:10) (18 - 18)  SpO2: 96% (04-21-20 @ 04:10) (95% - 100%)  Wt(kg): --    04-20-20 @ 07:01  -  04-21-20 @ 07:00  --------------------------------------------------------  IN: 0 mL / OUT: 1100 mL / NET: -1100 mL    Physical Exam:  	Gen: drowsy, but  responding, on nasal cannula  	HEENT: supple neck  	Pulm: fair air entry, diminished breath sounds  	CV: RRR, S1S2  	Abd: +BS, soft  	: + Cevallos catheter   	LE: (+) bilateral edema  	Skin: Right dorsal foot wound, wound on right forearm    LABS/STUDIES  --------------------------------------------------------------------------------              8.2    10.57 >-----------<  231      [04-21-20 @ 06:20]              29.9     147  |  103  |  113  ----------------------------<  134      [04-21-20 @ 06:20]  5.1   |  25  |  3.83        Ca     8.0     [04-21-20 @ 06:20]      Mg     2.1     [04-21-20 @ 06:20]      Phos  7.3     [04-21-20 @ 06:20]    TPro  5.8  /  Alb  2.3  /  TBili  0.2  /  DBili  x   /  AST  12  /  ALT  22  /  AlkPhos  132  [04-21-20 @ 06:20]    Creatinine Trend:  SCr 3.83 [04-21 @ 06:20]  SCr 3.89 [04-20 @ 06:26]  SCr 3.75 [04-19 @ 10:23]  SCr 3.63 [04-18 @ 03:00]  SCr 3.41 [04-17 @ 05:54]    Urinalysis - [04-04-20 @ 21:00]      Color YELLOW / Appearance CLEAR / SG 1.019 / pH 6.0      Gluc NEGATIVE / Ketone NEGATIVE  / Bili NEGATIVE / Urobili 0.2       Blood NEGATIVE / Protein MODERATE / Leuk Est SMALL / Nitrite NEGATIVE      RBC NONE / WBC 2-5 / Hyaline  / Gran  / Sq Epi  / Non Sq Epi FEW / Bacteria MODERATE    Iron 24, TIBC 135, %sat --      [04-14-20 @ 06:00]  Ferritin 1068      [04-14-20 @ 06:00]  HbA1c 6.4      [04-05-20 @ 06:20]  TSH 2.98      [04-14-20 @ 06:00]    HBsAg NEGATIVE      [04-05-20 @ 12:00]  HCV 0.21, Nonreactive Hepatitis C AB  C3 Complement 156.1      [04-05-20 @ 12:00]  C4 Complement 41.5      [04-05-20 @ 12:00] [facility] DIVISION OF KIDNEY DISEASES AND HYPERTENSION -- FOLLOW UP NOTE  --------------------------------------------------------------------------------  HPI: 73 yo F with RA c/b chronic bronchiectasis, HTN, and DM2 is admitted with SOB. Nephrology team is consulted for NBA and hyperkalemia. On admission (4/4/20), Scr was elevated to 2.82, which as worsened to 3.31 on 4/5/20. Patient required urgent HD on 4/6/20. Last HD session was on 4/14/20. Patient had expressed previously she would not want HD long term. Pt initialyl on IV diuretics but switched to PO yesterday (4/20/20) with good urine output. Scr remains elevated but stable at 3.83 today.    Patient evaluated at bedside, in no acute distress. Lethargic during exam.    PAST HISTORY  --------------------------------------------------------------------------------  No significant changes to PMH, PSH, FHx, SHx, unless otherwise noted    ALLERGIES & MEDICATIONS  --------------------------------------------------------------------------------  Allergies    No Known Allergies    Intolerances    Standing Inpatient Medications  apixaban 5 milliGRAM(s) Oral two times a day  budesonide  80 MICROgram(s)/formoterol 4.5 MICROgram(s) Inhaler 2 Puff(s) Inhalation two times a day  ciprofloxacin     Tablet 250 milliGRAM(s) Oral two times a day  dextrose 5%. 1000 milliLiter(s) IV Continuous <Continuous>  dextrose 50% Injectable 12.5 Gram(s) IV Push once  dextrose 50% Injectable 12.5 Gram(s) IV Push once  folic acid 1 milliGRAM(s) Oral daily  furosemide    Tablet 80 milliGRAM(s) Oral two times a day  insulin glargine Injectable (LANTUS) 8 Unit(s) SubCutaneous at bedtime  insulin lispro (HumaLOG) corrective regimen sliding scale   SubCutaneous Before meals and at bedtime  metoprolol tartrate 25 milliGRAM(s) Oral two times a day  predniSONE   Tablet 15 milliGRAM(s) Oral daily  silver sulfADIAZINE 1% Cream 1 Application(s) Topical daily  sodium bicarbonate 650 milliGRAM(s) Oral three times a day  sodium zirconium cyclosilicate 10 Gram(s) Oral daily    REVIEW OF SYSTEMS  --------------------------------------------------------------------------------  Unable to obtain due to medical condition    All other systems were reviewed and are negative, except as noted.    VITALS/PHYSICAL EXAM  --------------------------------------------------------------------------------  T(C): 36.8 (04-21-20 @ 04:10), Max: 36.8 (04-20-20 @ 09:26)  HR: 99 (04-21-20 @ 04:10) (87 - 109)  BP: 123/59 (04-21-20 @ 07:43) (122/59 - 137/56)  RR: 18 (04-21-20 @ 04:10) (18 - 18)  SpO2: 96% (04-21-20 @ 04:10) (95% - 100%)    04-20-20 @ 07:01  -  04-21-20 @ 07:00  --------------------------------------------------------  IN: 0 mL / OUT: 1100 mL / NET: -1100 mL    Physical Exam:  	Gen: drowsy, but  responding, on nasal cannula  	HEENT: supple neck  	Pulm: fair air entry, diminished breath sounds  	CV: RRR, S1S2  	Abd: +BS, soft  	: + Cevallos catheter   	LE: (+) bilateral edema  	Skin: Right dorsal foot wound, wound on right forearm    LABS/STUDIES  --------------------------------------------------------------------------------              8.2    10.57 >-----------<  231      [04-21-20 @ 06:20]              29.9     147  |  103  |  113  ----------------------------<  134      [04-21-20 @ 06:20]  5.1   |  25  |  3.83        Ca     8.0     [04-21-20 @ 06:20]      Mg     2.1     [04-21-20 @ 06:20]      Phos  7.3     [04-21-20 @ 06:20]    TPro  5.8  /  Alb  2.3  /  TBili  0.2  /  DBili  x   /  AST  12  /  ALT  22  /  AlkPhos  132  [04-21-20 @ 06:20]    Creatinine Trend:  SCr 3.83 [04-21 @ 06:20]  SCr 3.89 [04-20 @ 06:26]  SCr 3.75 [04-19 @ 10:23]  SCr 3.63 [04-18 @ 03:00]  SCr 3.41 [04-17 @ 05:54]    Urinalysis - [04-04-20 @ 21:00]      Color YELLOW / Appearance CLEAR / SG 1.019 / pH 6.0      Gluc NEGATIVE / Ketone NEGATIVE  / Bili NEGATIVE / Urobili 0.2       Blood NEGATIVE / Protein MODERATE / Leuk Est SMALL / Nitrite NEGATIVE      RBC NONE / WBC 2-5 / Hyaline  / Gran  / Sq Epi  / Non Sq Epi FEW / Bacteria MODERATE    Iron 24, TIBC 135, %sat --      [04-14-20 @ 06:00]  Ferritin 1068      [04-14-20 @ 06:00]  HbA1c 6.4      [04-05-20 @ 06:20]  TSH 2.98      [04-14-20 @ 06:00]    HBsAg NEGATIVE      [04-05-20 @ 12:00]  HCV 0.21, Nonreactive Hepatitis C AB  C3 Complement 156.1      [04-05-20 @ 12:00]  C4 Complement 41.5      [04-05-20 @ 12:00]

## 2020-04-21 NOTE — PROGRESS NOTE ADULT - ASSESSMENT
75 yo F w/ PMH HLD, HTN, insulin dependent T2DM, RA (on chronic prednisone, leflunomide, and hydroxychloroquine) c/b bronchiectasis (on home O2 4 L NC) who p/w worsening LE swelling  Leukocytosis, no fever  E coli bacteremia, unclear source, S to CTX  COVID neg, CXR clear, no fevers--would not retest as we have alternate explanation for inflammatory response  CT A/P reassuring but with pancreatic lesion  Overall,  1) E coli bacteremia  - Cipro PO 250mg q 12 through 4/24/20  2) R/O COVID  - CXR clear, no fevers, COVID PCR neg, lower suspicion for COVID  3) Leukocytosis  - Trend to normal  4) LE wounds  - Wound care per primary team  5) Pancreatic lesion  - Care/workup per primary team    Signing off. Please call with further questions or change in status.    Oral Peacock MD  Pager [phone]  After 5pm and on weekends call [phone]

## 2020-04-21 NOTE — PROGRESS NOTE ADULT - PROBLEM SELECTOR PLAN 1
Pt. initially presented with severe hyperkalemia in the setting of NBA requiring urgent HD. Serum potassium today is 5.1 (mildly hemolyzed). Pt on Lokelma 10 mg daily. Low potassium diet. Monitor urine output.  Monitor serum potassium.

## 2020-04-22 LAB
ALBUMIN SERPL ELPH-MCNC: 2.1 G/DL — LOW (ref 3.3–5)
ALP SERPL-CCNC: 126 U/L — HIGH (ref 40–120)
ALT FLD-CCNC: 21 U/L — SIGNIFICANT CHANGE UP (ref 4–33)
ANION GAP SERPL CALC-SCNC: 20 MMO/L — HIGH (ref 7–14)
AST SERPL-CCNC: 11 U/L — SIGNIFICANT CHANGE UP (ref 4–32)
BILIRUB SERPL-MCNC: 0.2 MG/DL — SIGNIFICANT CHANGE UP (ref 0.2–1.2)
BUN SERPL-MCNC: 108 MG/DL — HIGH (ref 7–23)
CALCIUM SERPL-MCNC: 7.7 MG/DL — LOW (ref 8.4–10.5)
CHLORIDE SERPL-SCNC: 107 MMOL/L — SIGNIFICANT CHANGE UP (ref 98–107)
CO2 SERPL-SCNC: 22 MMOL/L — SIGNIFICANT CHANGE UP (ref 22–31)
CREAT SERPL-MCNC: 4.03 MG/DL — HIGH (ref 0.5–1.3)
GLUCOSE BLDC GLUCOMTR-MCNC: 131 MG/DL — HIGH (ref 70–99)
GLUCOSE BLDC GLUCOMTR-MCNC: 152 MG/DL — HIGH (ref 70–99)
GLUCOSE BLDC GLUCOMTR-MCNC: 172 MG/DL — HIGH (ref 70–99)
GLUCOSE BLDC GLUCOMTR-MCNC: 191 MG/DL — HIGH (ref 70–99)
GLUCOSE SERPL-MCNC: 119 MG/DL — HIGH (ref 70–99)
HCT VFR BLD CALC: 28.8 % — LOW (ref 34.5–45)
HGB BLD-MCNC: 8.1 G/DL — LOW (ref 11.5–15.5)
MAGNESIUM SERPL-MCNC: 2.1 MG/DL — SIGNIFICANT CHANGE UP (ref 1.6–2.6)
MCHC RBC-ENTMCNC: 28.1 % — LOW (ref 32–36)
MCHC RBC-ENTMCNC: 29 PG — SIGNIFICANT CHANGE UP (ref 27–34)
MCV RBC AUTO: 103.2 FL — HIGH (ref 80–100)
NRBC # FLD: 0.59 K/UL — SIGNIFICANT CHANGE UP (ref 0–0)
NRBC FLD-RTO: 5.2 — SIGNIFICANT CHANGE UP
PHOSPHATE SERPL-MCNC: 7.6 MG/DL — HIGH (ref 2.5–4.5)
PLATELET # BLD AUTO: 268 K/UL — SIGNIFICANT CHANGE UP (ref 150–400)
PMV BLD: 12.1 FL — SIGNIFICANT CHANGE UP (ref 7–13)
POTASSIUM SERPL-MCNC: 5.1 MMOL/L — SIGNIFICANT CHANGE UP (ref 3.5–5.3)
POTASSIUM SERPL-MCNC: 5.5 MMOL/L — HIGH (ref 3.5–5.3)
POTASSIUM SERPL-SCNC: 5.1 MMOL/L — SIGNIFICANT CHANGE UP (ref 3.5–5.3)
POTASSIUM SERPL-SCNC: 5.5 MMOL/L — HIGH (ref 3.5–5.3)
PROT SERPL-MCNC: 5.6 G/DL — LOW (ref 6–8.3)
RBC # BLD: 2.79 M/UL — LOW (ref 3.8–5.2)
RBC # FLD: 17.7 % — HIGH (ref 10.3–14.5)
SODIUM SERPL-SCNC: 149 MMOL/L — HIGH (ref 135–145)
WBC # BLD: 11.39 K/UL — HIGH (ref 3.8–10.5)
WBC # FLD AUTO: 11.39 K/UL — HIGH (ref 3.8–10.5)

## 2020-04-22 PROCEDURE — 93010 ELECTROCARDIOGRAM REPORT: CPT

## 2020-04-22 PROCEDURE — 99232 SBSQ HOSP IP/OBS MODERATE 35: CPT | Mod: CS

## 2020-04-22 RX ADMIN — Medication 1: at 12:20

## 2020-04-22 RX ADMIN — Medication 80 MILLIGRAM(S): at 17:38

## 2020-04-22 RX ADMIN — Medication 250 MILLIGRAM(S): at 17:37

## 2020-04-22 RX ADMIN — APIXABAN 5 MILLIGRAM(S): 2.5 TABLET, FILM COATED ORAL at 17:38

## 2020-04-22 RX ADMIN — Medication 250 MILLIGRAM(S): at 04:36

## 2020-04-22 RX ADMIN — Medication 1 MILLIGRAM(S): at 12:10

## 2020-04-22 RX ADMIN — Medication 650 MILLIGRAM(S): at 21:32

## 2020-04-22 RX ADMIN — Medication 650 MILLIGRAM(S): at 12:10

## 2020-04-22 RX ADMIN — Medication 650 MILLIGRAM(S): at 04:36

## 2020-04-22 RX ADMIN — Medication 80 MILLIGRAM(S): at 04:36

## 2020-04-22 RX ADMIN — INSULIN GLARGINE 8 UNIT(S): 100 INJECTION, SOLUTION SUBCUTANEOUS at 21:31

## 2020-04-22 RX ADMIN — Medication 1: at 17:43

## 2020-04-22 RX ADMIN — BUDESONIDE AND FORMOTEROL FUMARATE DIHYDRATE 2 PUFF(S): 160; 4.5 AEROSOL RESPIRATORY (INHALATION) at 21:32

## 2020-04-22 RX ADMIN — Medication 25 MILLIGRAM(S): at 04:15

## 2020-04-22 RX ADMIN — Medication 1 APPLICATION(S): at 12:11

## 2020-04-22 RX ADMIN — APIXABAN 5 MILLIGRAM(S): 2.5 TABLET, FILM COATED ORAL at 04:36

## 2020-04-22 RX ADMIN — Medication 15 MILLIGRAM(S): at 04:36

## 2020-04-22 RX ADMIN — BUDESONIDE AND FORMOTEROL FUMARATE DIHYDRATE 2 PUFF(S): 160; 4.5 AEROSOL RESPIRATORY (INHALATION) at 09:40

## 2020-04-22 RX ADMIN — Medication 25 MILLIGRAM(S): at 17:39

## 2020-04-22 NOTE — PROVIDER CONTACT NOTE (OTHER) - ACTION/TREATMENT ORDERED:
Metoprolol given. EKG completed in chart. IV inserted for any medications needed to be given IV push. No further recommendations at this time, will continue to monitor.

## 2020-04-22 NOTE — PROGRESS NOTE ADULT - PROBLEM SELECTOR PLAN 6
on chronic prednisone  spoke w outpat Rheumatolgist, for spinal stenosis  on pred 15mg, will lower to 12.5mg after 14 days

## 2020-04-22 NOTE — CHART NOTE - NSCHARTNOTEFT_GEN_A_CORE
K+5.5 spoke to attending sample hemolyzed will rpt at 6:00pm no need to correct at the moment.   d/w attending

## 2020-04-22 NOTE — PROGRESS NOTE ADULT - PROBLEM SELECTOR PLAN 10
GOC discussion with palliative, given pt has verbally repeated she is interested in comfort measures when she had normal mental status  Now family pursuing home hospice; ongoing discussion. Still full code.

## 2020-04-22 NOTE — PROGRESS NOTE ADULT - PROBLEM SELECTOR PLAN 1
-hypercarbic resp failure, lethargic   -pt with both respiratory and metabolic acidosis  -on BiPAP at night for resp acidosis, will have her use more hours, sodium bicarb for metabolic acidosis;   -NH4, normal, infectious work up normal

## 2020-04-22 NOTE — PROGRESS NOTE ADULT - COVID-19 NEGATIVE LAB RESULT
COVID-19 ruled out

## 2020-04-22 NOTE — PROVIDER CONTACT NOTE (OTHER) - ASSESSMENT
Vital signs stable, afebrile. Blood pressure 116/96. Rectal Temp of 99.7. Oral 98.3. No acute signs of distress noted.

## 2020-04-22 NOTE — PROGRESS NOTE ADULT - SUBJECTIVE AND OBJECTIVE BOX
DIOR SUTTON  74y  Female      Patient is a 74y old  Female who presents with a chief complaint of LE swelling (21 Apr 2020 12:53)      INTERVAL HPI/OVERNIGHT EVENTS:  Pt lethargic. No answering questions.     Medications:  acetaminophen   Tablet .. 650 milliGRAM(s) Oral every 6 hours PRN  ALBUTerol    90 MICROgram(s) HFA Inhaler 2 Puff(s) Inhalation every 6 hours PRN  apixaban 5 milliGRAM(s) Oral two times a day  budesonide  80 MICROgram(s)/formoterol 4.5 MICROgram(s) Inhaler 2 Puff(s) Inhalation two times a day  ciprofloxacin     Tablet 250 milliGRAM(s) Oral two times a day  dextrose 40% Gel 15 Gram(s) Oral once PRN  dextrose 40% Gel 15 Gram(s) Oral once PRN  dextrose 5%. 1000 milliLiter(s) IV Continuous <Continuous>  dextrose 50% Injectable 12.5 Gram(s) IV Push once  dextrose 50% Injectable 12.5 Gram(s) IV Push once  folic acid 1 milliGRAM(s) Oral daily  furosemide    Tablet 80 milliGRAM(s) Oral two times a day  glucagon  Injectable 1 milliGRAM(s) IntraMuscular once PRN  insulin glargine Injectable (LANTUS) 8 Unit(s) SubCutaneous at bedtime  insulin lispro (HumaLOG) corrective regimen sliding scale   SubCutaneous Before meals and at bedtime  metoprolol tartrate 25 milliGRAM(s) Oral two times a day  midodrine. 10 milliGRAM(s) Oral <User Schedule> PRN  ondansetron Injectable 4 milliGRAM(s) IV Push every 8 hours PRN  predniSONE   Tablet 15 milliGRAM(s) Oral daily  silver sulfADIAZINE 1% Cream 1 Application(s) Topical daily  simethicone 80 milliGRAM(s) Chew two times a day PRN  sodium bicarbonate 650 milliGRAM(s) Oral three times a day      REVIEW OF SYSTEMS:  could not obtain, as pt lethargic. Minimally verbal.     T(C): 37.6 (04-22-20 @ 04:17), Max: 37.6 (04-22-20 @ 04:17)  HR: 96 (04-22-20 @ 04:14) (77 - 125)  BP: 116/96 (04-22-20 @ 04:17) (92/56 - 135/58)  RR: 19 (04-22-20 @ 03:23) (18 - 19)  SpO2: 100% (04-22-20 @ 03:23) (98% - 100%)  Wt(kg): --Vital Signs Last 24 Hrs  T(C): 37.6 (22 Apr 2020 04:17), Max: 37.6 (22 Apr 2020 04:17)  T(F): 99.7 (22 Apr 2020 04:17), Max: 99.7 (22 Apr 2020 04:17)  HR: 96 (22 Apr 2020 04:14) (77 - 125)  BP: 116/96 (22 Apr 2020 04:17) (92/56 - 135/58)  BP(mean): 71 (21 Apr 2020 11:55) (71 - 71)  RR: 19 (22 Apr 2020 03:23) (18 - 19)  SpO2: 100% (22 Apr 2020 03:23) (98% - 100%)    PHYSICAL EXAM:  PHYSICAL EXAM:  CONSTITUTIONAL: NAD, obese  ENMT: Moist oral mucosa, no pharyngeal injection or exudates; normal dentition  RESPIRATORY: Normal respiratory effort; lungs clear, but low breath sounds  CARDIOVASCULAR: RRR, no m,r,g; anasarca   ABDOMEN: Nontender to palpation, normoactive bowel sounds, no rebound/guarding; No hepatosplenomegaly  PSYCH: A+O X 1  SKIN: superficial hematoma, abrasions of skin consistent with long term prednisone use     Consultant(s) Notes Reviewed:  [x ] YES  [ ] NO  Care Discussed with Consultants/Other Providers [ x] YES  [ ] NO    LABS:                        8.1    11.39 )-----------( 268      ( 22 Apr 2020 07:05 )             28.8     04-22    149<H>  |  107  |  108<H>  ----------------------------<  119<H>  5.5<H>   |  22  |  4.03<H>    Ca    7.7<L>      22 Apr 2020 07:05  Phos  7.6     04-22  Mg     2.1     04-22    TPro  5.6<L>  /  Alb  2.1<L>  /  TBili  0.2  /  DBili  x   /  AST  11  /  ALT  21  /  AlkPhos  126<H>  04-22        CAPILLARY BLOOD GLUCOSE      POCT Blood Glucose.: 131 mg/dL (22 Apr 2020 08:42)  POCT Blood Glucose.: 142 mg/dL (21 Apr 2020 21:18)  POCT Blood Glucose.: 169 mg/dL (21 Apr 2020 17:16)  POCT Blood Glucose.: 181 mg/dL (21 Apr 2020 12:06)      ABG - ( 21 Apr 2020 09:56 )  pH, Arterial: 7.29  pH, Blood: x     /  pCO2: 61    /  pO2: 161   / HCO3: 26    / Base Excess: 2.7   /  SaO2: 98.6                  RADIOLOGY & ADDITIONAL TESTS:    Imaging Personally Reviewed:  [ ] YES  [ ] NO    HEALTH ISSUES - PROBLEM Dx:  Acidosis: Acidosis  Palliative care encounter: Palliative care encounter  Advance care planning: Advance care planning  Other encephalopathy: Other encephalopathy  Afib: Afib  Metabolic encephalopathy: Metabolic encephalopathy  Chronic obstructive pulmonary disease, unspecified COPD type: Chronic obstructive pulmonary disease, unspecified COPD type  Pancreatic mass: Pancreatic mass  Anemia due to blood loss: Anemia due to blood loss  Hypertension, unspecified type: Hypertension, unspecified type  E coli bacteremia: E coli bacteremia  Rheumatoid arthritis of hip, unspecified laterality, unspecified rheumatoid factor presence: Rheumatoid arthritis of hip, unspecified laterality, unspecified rheumatoid factor presence  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 complication: 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 complication  Acute renal failure, unspecified acute renal failure type: Acute renal failure, unspecified acute renal failure type  Renal failure, unspecified chronicity: Renal failure, unspecified chronicity  Rheumatoid arthritis: Rheumatoid arthritis  SIRS (systemic inflammatory response syndrome): SIRS (systemic inflammatory response syndrome)  Discharge planning issues: Discharge planning issues  Prophylactic measure: Prophylactic measure  Bronchiectasis: Bronchiectasis  Hypertension: Hypertension  Type 2 diabetes mellitus: Type 2 diabetes mellitus  Acute on chronic diastolic (congestive) heart failure: Acute on chronic diastolic (congestive) heart failure  Hyperkalemia: Hyperkalemia  Acute renal failure: Acute renal failure

## 2020-04-23 DIAGNOSIS — I48.11 LONGSTANDING PERSISTENT ATRIAL FIBRILLATION: ICD-10-CM

## 2020-04-23 DIAGNOSIS — E87.0 HYPEROSMOLALITY AND HYPERNATREMIA: ICD-10-CM

## 2020-04-23 LAB
ANION GAP SERPL CALC-SCNC: 17 MMO/L — HIGH (ref 7–14)
BASOPHILS # BLD AUTO: 0.02 K/UL — SIGNIFICANT CHANGE UP (ref 0–0.2)
BASOPHILS NFR BLD AUTO: 0.2 % — SIGNIFICANT CHANGE UP (ref 0–2)
BUN SERPL-MCNC: 122 MG/DL — HIGH (ref 7–23)
CALCIUM SERPL-MCNC: 7.9 MG/DL — LOW (ref 8.4–10.5)
CHLORIDE SERPL-SCNC: 107 MMOL/L — SIGNIFICANT CHANGE UP (ref 98–107)
CO2 SERPL-SCNC: 29 MMOL/L — SIGNIFICANT CHANGE UP (ref 22–31)
CREAT SERPL-MCNC: 4.32 MG/DL — HIGH (ref 0.5–1.3)
EOSINOPHIL # BLD AUTO: 0.12 K/UL — SIGNIFICANT CHANGE UP (ref 0–0.5)
EOSINOPHIL NFR BLD AUTO: 1.1 % — SIGNIFICANT CHANGE UP (ref 0–6)
GLUCOSE BLDC GLUCOMTR-MCNC: 166 MG/DL — HIGH (ref 70–99)
GLUCOSE BLDC GLUCOMTR-MCNC: 175 MG/DL — HIGH (ref 70–99)
GLUCOSE BLDC GLUCOMTR-MCNC: 194 MG/DL — HIGH (ref 70–99)
GLUCOSE BLDC GLUCOMTR-MCNC: 222 MG/DL — HIGH (ref 70–99)
GLUCOSE SERPL-MCNC: 170 MG/DL — HIGH (ref 70–99)
HCT VFR BLD CALC: 29.9 % — LOW (ref 34.5–45)
HGB BLD-MCNC: 8.2 G/DL — LOW (ref 11.5–15.5)
IMM GRANULOCYTES NFR BLD AUTO: 12.7 % — HIGH (ref 0–1.5)
LYMPHOCYTES # BLD AUTO: 0.62 K/UL — LOW (ref 1–3.3)
LYMPHOCYTES # BLD AUTO: 5.7 % — LOW (ref 13–44)
MAGNESIUM SERPL-MCNC: 2.2 MG/DL — SIGNIFICANT CHANGE UP (ref 1.6–2.6)
MANUAL SMEAR VERIFICATION: SIGNIFICANT CHANGE UP
MCHC RBC-ENTMCNC: 27.4 % — LOW (ref 32–36)
MCHC RBC-ENTMCNC: 28.2 PG — SIGNIFICANT CHANGE UP (ref 27–34)
MCV RBC AUTO: 102.7 FL — HIGH (ref 80–100)
MONOCYTES # BLD AUTO: 0.5 K/UL — SIGNIFICANT CHANGE UP (ref 0–0.9)
MONOCYTES NFR BLD AUTO: 4.6 % — SIGNIFICANT CHANGE UP (ref 2–14)
NEUTROPHILS # BLD AUTO: 8.28 K/UL — HIGH (ref 1.8–7.4)
NEUTROPHILS NFR BLD AUTO: 75.7 % — SIGNIFICANT CHANGE UP (ref 43–77)
NRBC # FLD: 0.37 K/UL — SIGNIFICANT CHANGE UP (ref 0–0)
NRBC FLD-RTO: 3.4 — SIGNIFICANT CHANGE UP
PHOSPHATE SERPL-MCNC: 7.2 MG/DL — HIGH (ref 2.5–4.5)
PLATELET # BLD AUTO: 279 K/UL — SIGNIFICANT CHANGE UP (ref 150–400)
PMV BLD: 11.4 FL — SIGNIFICANT CHANGE UP (ref 7–13)
POTASSIUM SERPL-MCNC: 5.3 MMOL/L — SIGNIFICANT CHANGE UP (ref 3.5–5.3)
POTASSIUM SERPL-SCNC: 5.3 MMOL/L — SIGNIFICANT CHANGE UP (ref 3.5–5.3)
RBC # BLD: 2.91 M/UL — LOW (ref 3.8–5.2)
RBC # FLD: 18 % — HIGH (ref 10.3–14.5)
SODIUM SERPL-SCNC: 153 MMOL/L — HIGH (ref 135–145)
WBC # BLD: 10.93 K/UL — HIGH (ref 3.8–10.5)
WBC # FLD AUTO: 10.93 K/UL — HIGH (ref 3.8–10.5)

## 2020-04-23 RX ORDER — METOPROLOL TARTRATE 50 MG
50 TABLET ORAL
Refills: 0 | Status: DISCONTINUED | OUTPATIENT
Start: 2020-04-23 | End: 2020-04-23

## 2020-04-23 RX ORDER — METOPROLOL TARTRATE 50 MG
50 TABLET ORAL
Refills: 0 | Status: DISCONTINUED | OUTPATIENT
Start: 2020-04-23 | End: 2020-04-24

## 2020-04-23 RX ORDER — METOPROLOL TARTRATE 50 MG
2.5 TABLET ORAL ONCE
Refills: 0 | Status: COMPLETED | OUTPATIENT
Start: 2020-04-23 | End: 2020-04-23

## 2020-04-23 RX ADMIN — Medication 650 MILLIGRAM(S): at 12:49

## 2020-04-23 RX ADMIN — Medication 1 APPLICATION(S): at 12:49

## 2020-04-23 RX ADMIN — Medication 250 MILLIGRAM(S): at 04:12

## 2020-04-23 RX ADMIN — Medication 250 MILLIGRAM(S): at 18:12

## 2020-04-23 RX ADMIN — INSULIN GLARGINE 8 UNIT(S): 100 INJECTION, SOLUTION SUBCUTANEOUS at 21:30

## 2020-04-23 RX ADMIN — Medication 80 MILLIGRAM(S): at 04:12

## 2020-04-23 RX ADMIN — Medication 80 MILLIGRAM(S): at 18:12

## 2020-04-23 RX ADMIN — APIXABAN 5 MILLIGRAM(S): 2.5 TABLET, FILM COATED ORAL at 18:11

## 2020-04-23 RX ADMIN — Medication 650 MILLIGRAM(S): at 21:17

## 2020-04-23 RX ADMIN — Medication 1: at 09:00

## 2020-04-23 RX ADMIN — Medication 25 MILLIGRAM(S): at 05:42

## 2020-04-23 RX ADMIN — Medication 50 MILLIGRAM(S): at 18:11

## 2020-04-23 RX ADMIN — APIXABAN 5 MILLIGRAM(S): 2.5 TABLET, FILM COATED ORAL at 04:12

## 2020-04-23 RX ADMIN — Medication 1: at 12:49

## 2020-04-23 RX ADMIN — Medication 2.5 MILLIGRAM(S): at 04:11

## 2020-04-23 RX ADMIN — Medication 2: at 17:55

## 2020-04-23 RX ADMIN — Medication 1 MILLIGRAM(S): at 12:48

## 2020-04-23 RX ADMIN — BUDESONIDE AND FORMOTEROL FUMARATE DIHYDRATE 2 PUFF(S): 160; 4.5 AEROSOL RESPIRATORY (INHALATION) at 09:00

## 2020-04-23 RX ADMIN — BUDESONIDE AND FORMOTEROL FUMARATE DIHYDRATE 2 PUFF(S): 160; 4.5 AEROSOL RESPIRATORY (INHALATION) at 21:44

## 2020-04-23 RX ADMIN — Medication 1: at 21:30

## 2020-04-23 RX ADMIN — Medication 15 MILLIGRAM(S): at 04:12

## 2020-04-23 RX ADMIN — Medication 650 MILLIGRAM(S): at 05:42

## 2020-04-23 NOTE — ADVANCED PRACTICE NURSE CONSULT - ASSESSMENT
A&Ox0, confused, patient received with supplemental oxygen via nasal cannula, patient obese with large ABD pannus and generalized severe non pitting edema. Generalized 4+ edema, left elbow with intact scab, currently bedbound, incontinent of urine and stool. Indwelling cathether in place. Skin warm, dry with increased moisture in intertriginous folds, scattered areas of hyperpigmentation and hypopigmentation, scattered areas of ecchymosis on bilateral upper extremities. Blanchable erythema on bilateral heels.     Right anterior arm wound previously assessed as opened blister)- 2 open ulcers noted exposing necrotic tissue   Right anterior arm [id number]- Tissue type presenting as 30% fluid filled blister and 70% black dry eschar firmly attach.   0.5cm away towards medial arm there is a second ulcer measuring [id number]. Unable to determine true anatomical depth secondary to necrotic tissue. Tissue type presenting with 100% black dry eschar firmly attach.   For both ulcers Scant serous drainage. NO odor. Periwound skin with ecchymosis and fragile thin skin. No increased warmth, no erythema, no induration. Goals of care: monitor for tissue type changes, manage drainage, reduce/control bioburden, maintain stable eschar.     Severe Moisture/Incontinence associated dermatitis in bilateral buttocks extending to posterior thighs with areas of denuded epidermis exposing pink dermis. Left buttock with linear area of purple maroon discoloration measuring 1.5cmx0.5cm. No tissue type changes palpated underneath purple maroon discoloration.  Sacral fold with linear fissure exposing yellow translucent fibrin film and pink dermis. (Unable to visualized skin impairment in patients natural anatomical position).      Moisture associated dermatitis in bilateral breast and ABD pannus as evident by moist and hyperpigmentation.     Bilateral posterior knees with wounds secondary to moisture associated dermatitis and severe edema (previously assessed as linear fissures) exposing 100% fibrinous film and pink dermis. Moist base. Scant serous drainage. Periwound skin intact. No increased warmth, no erythema, no induration. Goals of care: monitor for tissue type changes, continue to offload, manage moisture.     Vascular: Bilateral lower extremities with scattered areas of hyper and hypopigmentation. Lichenification. Dry, flaky skin. Thin, shiny, taught skin. Thickened-yellow hyperpigmented overgrown toenails. Multiple wounds present. No temperature changes noted. 4+ Edema (mostly in dorsal foot). Capillary refill >3 seconds. DP/PT pulses unable to palpate secondary to edema. + pallor on elevation. Left posterior second toe with hyperpigmented callus. Right 5th toe with purple maroon discoloration.     Right anterior lower leg de roof blister- [id number]. Tissue type presents as 100% pale pink dermis. Friable. Scant serous drainage. No odor. Goals of care: monitor for tissue type changes, reduce, control bioburden, manage drainage.     Right dorsal aspect of foot open blister spontaneously opened upon cleansing (creamy discharge). No odor. [id number]. Tissue type presenting as 60% yellow moist fibrinous tissue and 40% pale pink dermis. Small serofibrinous drainage. No odor. Regular borders. Periwound skin intact. No increased warmth, no erythema, no induration. Goals of care: monitor for tissue type changes. manage drainage, reduce/control bioburden.     Left dorsal foot with scattered 5 intact reabsorbed fluid filled blisters largest one measures [id number].   Left posterior foot wound of unknown etiology vs. open blister- 2.5cmx2.5cmx0.2cm. Tissue type presenting as 50% yellow moist fibrinous tissue and 50% pale pink dermis. Scant serous drainage. No door. No increased warmth, no erythema, no induration.     Left medial to lateral lower leg open blister measuring [id number]. Regular borders. Tissue type exposing 75% pale pink dermis and 25% yellow moist firmly attach slough from 11-2 o'clock. Small serous drainage. No odor. Periwound skin intact. No increased warmth, no erythema, no induration. Goals of care: monitor for tissue type changes, reduce/control bioburden, atraumatic dressing change, reduce/control bioburden.

## 2020-04-23 NOTE — PROVIDER CONTACT NOTE (OTHER) - ACTION/TREATMENT ORDERED:
Metoprolol 2.5mg given. Will reassess heart rate. If no changes per PA administer AM metoprolol medication. Will continue to monitor.

## 2020-04-23 NOTE — CHART NOTE - NSCHARTNOTEFT_GEN_A_CORE
Received a message from nursing staff Received a message from nursing staff pt's son Cong Zapien HCP wanted the pt to be DNR/ DNI.    I spoke with Son over the phone -@[phone] - he wanted pt to be DNR / DNI and wanted comfort measures and use of antibiotics.  MOLST form was completed over the phone and witnessed by Diamond Billingsley PA-C - [facility] Medicine team.     Dr Wei aware - and to sign MOLST form in am.  DNR/ DNI order placed in sunrise Received a message from nursing staff pt's son Cong Zapien HCP wanted the pt to be DNR/ DNI.  Palliative team, hospice team, , and attending have spoken to pt's son Cong regarding advance directives during this admission.    I spoke with Son over the phone -@[phone]. He wanted pt to be DNR / DNI and wanted comfort measures and use of antibiotics.  MOLST form was completed over the phone and witnessed by Diamond Billingsley PA-C - [facility] Medicine team.     Dr Wei aware - and to sign MOLST form in am.  DNR/ DNI order placed in sunrise

## 2020-04-23 NOTE — PROGRESS NOTE ADULT - PROBLEM SELECTOR PLAN 1
secondary to long standing COPD due to chronic bronchiectasis with C02 retention when off the BiPAP will encourage increase use of this

## 2020-04-23 NOTE — PROGRESS NOTE ADULT - PROBLEM SELECTOR PLAN 6
slow increase in creat, patient has been very clear in the past when she was competent/lucid  that she is not interested in HD

## 2020-04-23 NOTE — ADVANCED PRACTICE NURSE CONSULT - REASON FOR CONSULT
Follow up note. Patient seen as a reconsult for multiple skin impairments.   Patient seen by wound care team for multiple skin impairment/wounds. Last seen on 4/10/2020. Chart reviewed. Since last seen patient consulted by Palliative team for GOC discussion in the setting for CKD and patient expressions of not wanting HD long term. As per Palliative team patient awaiting transfer to Hospice care at home. Patient receiving PO antibiotics for Ecoli bacteremia.

## 2020-04-23 NOTE — PROGRESS NOTE ADULT - PROBLEM SELECTOR PLAN 8
Patient was informed of the advised and has no further questions at this time.   old, no further eval needed

## 2020-04-23 NOTE — PROVIDER CONTACT NOTE (OTHER) - RECOMMENDATIONS
patient needs ultra sound guided IV access or midline
patient needs ultra sound guided IV access or midline
Administer prescribed metoprolol IV push.
Give IV metoprolol
Lowered BFr to 200ml/min and adjust fluid removal
Order EKG. Administer prescribed metoprolol.
PO Pain and Nausea Medication to relieve symptoms.
Please assess.
Please renew order
Potassium serum pending for 1800
Stop HD TX due to bleeding access site; reassess pt. and must be seen by Vascular for site reassessment.
Tele PA made aware, per Tele PA continue to monitor Pt Heart Rate.
Telemetry PA made aware
Ultrasound guided IV to be ordered
awaiting further orders at this time
follow hypoglycemia protocol
stop treatment

## 2020-04-23 NOTE — PROGRESS NOTE ADULT - ASSESSMENT
74 year old woman with multiple medical co morbidities, with renal failure, RA, afib,  and COPD with c02 retention when off BiPAP, who continued to have rising creat, rising Na, lethargy off BiPAP, no interest in HD when she was lucid,  awaiting home hospice however full CODE

## 2020-04-23 NOTE — PROGRESS NOTE ADULT - SUBJECTIVE AND OBJECTIVE BOX
Medicine Progress Note    Patient is a 74y old  Female  wtih multiple medical problems, initially presenting with LLE edema      SUBJECTIVE / OVERNIGHT EVENTS:   remains lethargic when off Bipap   ADDITIONAL REVIEW OF SYSTEMS:    MEDICATIONS  (STANDING):  apixaban 5 milliGRAM(s) Oral two times a day  budesonide  80 MICROgram(s)/formoterol 4.5 MICROgram(s) Inhaler 2 Puff(s) Inhalation two times a day  ciprofloxacin     Tablet 250 milliGRAM(s) Oral two times a day  dextrose 5%. 1000 milliLiter(s) (50 mL/Hr) IV Continuous <Continuous>  dextrose 50% Injectable 12.5 Gram(s) IV Push once  dextrose 50% Injectable 12.5 Gram(s) IV Push once  folic acid 1 milliGRAM(s) Oral daily  furosemide    Tablet 80 milliGRAM(s) Oral two times a day  insulin glargine Injectable (LANTUS) 8 Unit(s) SubCutaneous at bedtime  insulin lispro (HumaLOG) corrective regimen sliding scale   SubCutaneous Before meals and at bedtime  metoprolol tartrate 25 milliGRAM(s) Oral two times a day  predniSONE   Tablet 15 milliGRAM(s) Oral daily  silver sulfADIAZINE 1% Cream 1 Application(s) Topical daily  sodium bicarbonate 650 milliGRAM(s) Oral three times a day    MEDICATIONS  (PRN):  acetaminophen   Tablet .. 650 milliGRAM(s) Oral every 6 hours PRN Temp greater or equal to 38C (100.4F), Mild Pain (1 - 3), Moderate Pain (4 - 6)  ALBUTerol    90 MICROgram(s) HFA Inhaler 2 Puff(s) Inhalation every 6 hours PRN Shortness of Breath and/or Wheezing  dextrose 40% Gel 15 Gram(s) Oral once PRN Blood Glucose LESS THAN 70 milliGRAM(s)/deciliter  dextrose 40% Gel 15 Gram(s) Oral once PRN Blood Glucose LESS THAN 70 milliGRAM(s)/deciliter  glucagon  Injectable 1 milliGRAM(s) IntraMuscular once PRN Glucose LESS THAN 70 milligrams/deciliter  midodrine. 10 milliGRAM(s) Oral <User Schedule> PRN Pre-HD for Hypotension  ondansetron Injectable 4 milliGRAM(s) IV Push every 8 hours PRN Nausea and/or Vomiting  simethicone 80 milliGRAM(s) Chew two times a day PRN Upset Stomach    CAPILLARY BLOOD GLUCOSE      POCT Blood Glucose.: 166 mg/dL (23 Apr 2020 08:34)  POCT Blood Glucose.: 152 mg/dL (22 Apr 2020 21:31)  POCT Blood Glucose.: 191 mg/dL (22 Apr 2020 17:34)  POCT Blood Glucose.: 172 mg/dL (22 Apr 2020 12:05)    I&O's Summary    22 Apr 2020 07:01  -  23 Apr 2020 07:00  --------------------------------------------------------  IN: 0 mL / OUT: 600 mL / NET: -600 mL        PHYSICAL EXAM: on NC and lethargic   Vital Signs Last 24 Hrs  T(C): 36.4 (23 Apr 2020 10:15), Max: 37 (23 Apr 2020 03:44)  T(F): 97.5 (23 Apr 2020 10:15), Max: 98.6 (23 Apr 2020 03:44)  HR: 121 (23 Apr 2020 10:15) (86 - 136)  BP: 123/55 (23 Apr 2020 10:15) (117/68 - 133/60)  BP(mean): --  RR: 20 (23 Apr 2020 10:15) (18 - 20)  SpO2: 100% (23 Apr 2020 10:15) (97% - 100%)  CONSTITUTIONAL: NAD, well-developed, well-groomed  RESPIRATORY: Normal respiratory effort; lungs are clear to auscultation bilaterally  CARDIOVASCULAR: Regular rate and rhythm, normal S1 and S2, no murmur/rub/gallop; No lower extremity edema; Peripheral pulses are 2+ bilaterally  ABDOMEN: Nontender to palpation, normoactive bowel sounds, no rebound/guarding; No hepatosplenomegaly  PSYCH: lethargic, opens eyes to verbal stim    LABS:                        8.2    10.93 )-----------( 279      ( 23 Apr 2020 08:15 )             29.9     04-23    153<H>  |  107  |  122<H>  ----------------------------<  170<H>  5.3   |  29  |  4.32<H>    Ca    7.9<L>      23 Apr 2020 08:15  Phos  7.2     04-23  Mg     2.2     04-23    TPro  5.6<L>  /  Alb  2.1<L>  /  TBili  0.2  /  DBili  x   /  AST  11  /  ALT  21  /  AlkPhos  126<H>  04-22              COVID-19 PCR: NotDetec (05 Apr 2020 02:56)      RADIOLOGY & ADDITIONAL TESTS:  Imaging from Last 24 Hours: none

## 2020-04-23 NOTE — ADVANCED PRACTICE NURSE CONSULT - RECOMMEDATIONS
Patient awaiting transfer to home hospice    Topical Recommendations     All Intertriginous foldS: Clean with soap and water. Pat dry. Place Interdry textile sheeting, under intertriginous folds leaving 2 inches exposed at ends to wick, remove to wash & dry affected area, then replace. Individual sheeting may be used for up to 5 days unless soiled.     Protect posterior ears from nasal cannula with [place] offloading trach toe.     Right arm area of eschar: Betadine wipe, allow to dry. Cover with ABD pad and secure with kerlix. Change daily.     Posterior knees: Clean with NS. Pat dry. Apply Liquid barrier film to periwound skin. Cover with Silicone foam with borders. Change daily.     Left dorsal foot (intact fluid filled blisters) and bilateral heels: Apply Liquid barrier film daily.     Right dorsal foot, right anterior lower leg, left posterior lower leg, left posterior foot: Clean with NS. Pat dry. Apply Liquid barrier film to periwound skin. Cover with Silicone foam AG w/o borders. Secure with Kerlix. Change daily.     Continue low air loss bed therapy, continue heel elevation with Z-flex fluidized positioning boots, continue to turn & reposition q2h with Z-rebekah fluidized positioning device, soft pillow behind kneescontinue moisture management with barrier creams & single breathable pad, continue measures to decrease friction/shear/pressure. Continue with nutritional support as per dietary/orders.    Please contact Wound Care Service Line if we can be of further assistance (ext [phone]).

## 2020-04-24 ENCOUNTER — TRANSCRIPTION ENCOUNTER (OUTPATIENT)
Age: 75
End: 2020-04-24

## 2020-04-24 VITALS
TEMPERATURE: 98 F | DIASTOLIC BLOOD PRESSURE: 62 MMHG | RESPIRATION RATE: 20 BRPM | OXYGEN SATURATION: 100 % | HEART RATE: 100 BPM | SYSTOLIC BLOOD PRESSURE: 110 MMHG

## 2020-04-24 DIAGNOSIS — I48.91 UNSPECIFIED ATRIAL FIBRILLATION: ICD-10-CM

## 2020-04-24 LAB
GLUCOSE BLDC GLUCOMTR-MCNC: 179 MG/DL — HIGH (ref 70–99)
GLUCOSE BLDC GLUCOMTR-MCNC: 187 MG/DL — HIGH (ref 70–99)

## 2020-04-24 PROCEDURE — 99497 ADVNCD CARE PLAN 30 MIN: CPT | Mod: 25

## 2020-04-24 PROCEDURE — 99233 SBSQ HOSP IP/OBS HIGH 50: CPT

## 2020-04-24 PROCEDURE — 99238 HOSP IP/OBS DSCHRG MGMT 30/<: CPT

## 2020-04-24 RX ORDER — INSULIN DEGLUDEC 100 U/ML
8 INJECTION, SOLUTION SUBCUTANEOUS
Qty: 0 | Refills: 0 | DISCHARGE

## 2020-04-24 RX ORDER — MULTIVIT-MIN/FERROUS GLUCONATE 9 MG/15 ML
1 LIQUID (ML) ORAL
Qty: 0 | Refills: 0 | DISCHARGE

## 2020-04-24 RX ORDER — FOLIC ACID 0.8 MG
1 TABLET ORAL
Qty: 0 | Refills: 0 | DISCHARGE
Start: 2020-04-24

## 2020-04-24 RX ORDER — INSULIN ASPART 100 [IU]/ML
0 INJECTION, SOLUTION SUBCUTANEOUS
Qty: 0 | Refills: 0 | DISCHARGE

## 2020-04-24 RX ORDER — HYDRALAZINE HCL 50 MG
1 TABLET ORAL
Qty: 0 | Refills: 0 | DISCHARGE

## 2020-04-24 RX ORDER — LEFLUNOMIDE 10 MG/1
1 TABLET ORAL
Qty: 0 | Refills: 0 | DISCHARGE

## 2020-04-24 RX ORDER — BENAZEPRIL HYDROCHLORIDE 40 MG/1
1 TABLET ORAL
Qty: 0 | Refills: 0 | DISCHARGE

## 2020-04-24 RX ORDER — METOPROLOL TARTRATE 50 MG
1 TABLET ORAL
Qty: 60 | Refills: 0
Start: 2020-04-24 | End: 2020-05-23

## 2020-04-24 RX ORDER — APIXABAN 2.5 MG/1
1 TABLET, FILM COATED ORAL
Qty: 60 | Refills: 0
Start: 2020-04-24 | End: 2020-05-23

## 2020-04-24 RX ORDER — PANTOPRAZOLE SODIUM 20 MG/1
1 TABLET, DELAYED RELEASE ORAL
Qty: 0 | Refills: 0 | DISCHARGE

## 2020-04-24 RX ORDER — INSULIN DEGLUDEC 100 U/ML
40 INJECTION, SOLUTION SUBCUTANEOUS
Qty: 0 | Refills: 0 | DISCHARGE

## 2020-04-24 RX ORDER — CHOLECALCIFEROL (VITAMIN D3) 125 MCG
1 CAPSULE ORAL
Qty: 0 | Refills: 0 | DISCHARGE

## 2020-04-24 RX ORDER — METOPROLOL TARTRATE 50 MG
75 TABLET ORAL
Refills: 0 | Status: DISCONTINUED | OUTPATIENT
Start: 2020-04-24 | End: 2020-04-24

## 2020-04-24 RX ORDER — UBIDECARENONE 100 MG
1 CAPSULE ORAL
Qty: 0 | Refills: 0 | DISCHARGE

## 2020-04-24 RX ORDER — FUROSEMIDE 40 MG
1 TABLET ORAL
Qty: 60 | Refills: 0
Start: 2020-04-24 | End: 2020-05-23

## 2020-04-24 RX ORDER — FUROSEMIDE 40 MG
1 TABLET ORAL
Qty: 0 | Refills: 0 | DISCHARGE

## 2020-04-24 RX ORDER — GLIMEPIRIDE 1 MG
1 TABLET ORAL
Qty: 0 | Refills: 0 | DISCHARGE

## 2020-04-24 RX ORDER — AMLODIPINE BESYLATE 2.5 MG/1
1 TABLET ORAL
Qty: 0 | Refills: 0 | DISCHARGE

## 2020-04-24 RX ORDER — HYDROXYCHLOROQUINE SULFATE 200 MG
1 TABLET ORAL
Qty: 0 | Refills: 0 | DISCHARGE

## 2020-04-24 RX ORDER — BUDESONIDE AND FORMOTEROL FUMARATE DIHYDRATE 160; 4.5 UG/1; UG/1
2 AEROSOL RESPIRATORY (INHALATION)
Qty: 0 | Refills: 0 | DISCHARGE
Start: 2020-04-24

## 2020-04-24 RX ORDER — ALBUTEROL 90 UG/1
2 AEROSOL, METERED ORAL
Qty: 1 | Refills: 0
Start: 2020-04-24 | End: 2020-05-23

## 2020-04-24 RX ADMIN — Medication 1 APPLICATION(S): at 12:56

## 2020-04-24 RX ADMIN — BUDESONIDE AND FORMOTEROL FUMARATE DIHYDRATE 2 PUFF(S): 160; 4.5 AEROSOL RESPIRATORY (INHALATION) at 08:16

## 2020-04-24 RX ADMIN — Medication 80 MILLIGRAM(S): at 05:09

## 2020-04-24 RX ADMIN — Medication 1 MILLIGRAM(S): at 12:56

## 2020-04-24 RX ADMIN — Medication 75 MILLIGRAM(S): at 14:38

## 2020-04-24 RX ADMIN — Medication 1: at 08:43

## 2020-04-24 RX ADMIN — Medication 50 MILLIGRAM(S): at 05:10

## 2020-04-24 RX ADMIN — Medication 15 MILLIGRAM(S): at 05:09

## 2020-04-24 RX ADMIN — APIXABAN 5 MILLIGRAM(S): 2.5 TABLET, FILM COATED ORAL at 05:09

## 2020-04-24 RX ADMIN — Medication 250 MILLIGRAM(S): at 05:09

## 2020-04-24 RX ADMIN — Medication 650 MILLIGRAM(S): at 05:09

## 2020-04-24 RX ADMIN — Medication 1: at 12:36

## 2020-04-24 RX ADMIN — Medication 650 MILLIGRAM(S): at 12:56

## 2020-04-24 NOTE — PROGRESS NOTE ADULT - SUBJECTIVE AND OBJECTIVE BOX
HPI:  Patient is a 74 y.o F w/ PMH HLD, HTN, insulin dependent T2DM, RA (on chronic prednisone, leflunomide, and hydroxychloroquine) c/b bronchiectasis (on home O2 4 L NC) who presents with advanced CKD s/p initiation of iHD. Palliative consulted for GOC.      4/24/20  - Spoke to [place], the patient's son earlier  - He was understandably upset at the discharge plan for his mother. His bottomline is that: 1) He wants the patient discharged right away, 2) He wants home health aide services 24/7 to be implemented right away  - For the former, the patient's secondary insurance reportedly will pay for 24/7 HHA service according to [facility]  - However, for the latter, despite the efforts of hospice and case management, there is unfortunately no way for us to expedite starting home health aid services quickly. The process, if it will be indeed covered by the secondary insurance, will have to be approved.   - Our team SW has been coordinating diligently with [facility], but right now, we cannot fulfill [facility]'s requests for a 24/7 HHA  - A good compromise that we have offered is for the patient to be discharged to inpatient hospice first, while arranging the HHA situation, and if/when that is resolved, the patient could potentially be transferred home. This would afford the family the chance to visit the patient in the meantimes.  - This option has been presented to [facility] by [facility] and was unfortunately declined    =================================================================================================  ----- PERTINENT PMH/ SXH/ FHX  Hyperlipidemia  Bronchiectasis  Type 2 diabetes mellitus  Hypertension  Rheumatoid arthritis    No significant past surgical history    FAMILY HISTORY:  No pertinent family history in first degree relatives      ----- SOCIAL HISTORY:   [ ] Unable to elicit  Significant other/partner: Yes [X ]  No [ ] Children:  Yes [X ]  No [ ] Rastafarian/Spirituality:  Substance hx: Yes[ ]  No [ ]   Tobacco hx:  Yes [ ] No [ ]   Alcohol hx: Yes [ ] No [ ]   Home Opioid hx:  [ ] I-Stop Reference No:  Living Situation: [ ]Home  [ ]Long term care  [ ]Rehab [ ]Other    ----- ADVANCE DIRECTIVES:    DNR  MOLST  [ ]  Living Will  [ ]   DECISION MAKER(s):  [ ] Health Care Proxy(s)  [ ] Surrogate(s)  [ ] Guardian           Name(s): Phone Number(s):    ----- BASELINE (I)ADL(s) (prior to admission):  [place]: [ ]Total  [ ] Moderate [ ]Dependent  Palliative Performance Status Version 2:            =================================================================================================  -----MEDICATIONS AND ALLERGIES:  Allergies    No Known Allergies    Intolerances    MEDICATIONS  (STANDING):  apixaban 5 milliGRAM(s) Oral two times a day  budesonide  80 MICROgram(s)/formoterol 4.5 MICROgram(s) Inhaler 2 Puff(s) Inhalation two times a day  ciprofloxacin     Tablet 250 milliGRAM(s) Oral two times a day  dextrose 5%. 1000 milliLiter(s) (50 mL/Hr) IV Continuous <Continuous>  dextrose 50% Injectable 12.5 Gram(s) IV Push once  dextrose 50% Injectable 12.5 Gram(s) IV Push once  folic acid 1 milliGRAM(s) Oral daily  furosemide    Tablet 80 milliGRAM(s) Oral two times a day  insulin glargine Injectable (LANTUS) 8 Unit(s) SubCutaneous at bedtime  insulin lispro (HumaLOG) corrective regimen sliding scale   SubCutaneous Before meals and at bedtime  metoprolol tartrate 25 milliGRAM(s) Oral two times a day  predniSONE   Tablet 15 milliGRAM(s) Oral daily  silver sulfADIAZINE 1% Cream 1 Application(s) Topical daily  sodium bicarbonate 650 milliGRAM(s) Oral three times a day  sodium zirconium cyclosilicate 10 Gram(s) Oral daily    MEDICATIONS  (PRN):  acetaminophen   Tablet .. 650 milliGRAM(s) Oral every 6 hours PRN Temp greater or equal to 38C (100.4F), Mild Pain (1 - 3), Moderate Pain (4 - 6)  ALBUTerol    90 MICROgram(s) HFA Inhaler 2 Puff(s) Inhalation every 6 hours PRN Shortness of Breath and/or Wheezing  dextrose 40% Gel 15 Gram(s) Oral once PRN Blood Glucose LESS THAN 70 milliGRAM(s)/deciliter  dextrose 40% Gel 15 Gram(s) Oral once PRN Blood Glucose LESS THAN 70 milliGRAM(s)/deciliter  glucagon  Injectable 1 milliGRAM(s) IntraMuscular once PRN Glucose LESS THAN 70 milligrams/deciliter  midodrine. 10 milliGRAM(s) Oral <User Schedule> PRN Pre-HD for Hypotension  ondansetron Injectable 4 milliGRAM(s) IV Push every 8 hours PRN Nausea and/or Vomiting  simethicone 80 milliGRAM(s) Chew two times a day PRN Upset Stomach        =================================================================================================  ----- SYMPTOM ASSESSMENT: [ ]Unable to obtain due to poor mentation   Source if other than patient:  [ ]Family   [ ]Team     Pain (Impact on QOL):    Location -   Severity -        Minimal acceptable level (0-10 scale):  Quality:   Onset:   Duration:                 Aggravating factors -  Relieving factors -  Radiation -    PAIN AD Score:     REVIEW OF SYSTEMS (not present if not checked off):  Unable to elicit [ ]  Dyspnea: [ ]  Anxiety: [ ]  Fatigue: [ ]  Nausea: [ ]                       Loss of appetite: [ ]  Constipation: [ ]  Grief: [ ]    Other Symptoms:  [ ]All other review of systems negative       =================================================================================================  ----- PHYSICAL EXAM:  Vital Signs Last 24 Hrs  T(C): 37 (24 Apr 2020 12:54), Max: 37.2 (23 Apr 2020 21:08)  T(F): 98.6 (24 Apr 2020 12:54), Max: 99 (24 Apr 2020 05:04)  HR: 81 (24 Apr 2020 12:54) (81 - 117)  BP: 112/67 (24 Apr 2020 12:54) (112/67 - 129/98)  BP(mean): --  RR: 16 (24 Apr 2020 12:54) (16 - 18)  SpO2: 100% (24 Apr 2020 12:54) (92% - 100%)      =================================================================================================  ----- LABS:  04-23    153<H>  |  107  |  122<H>  ----------------------------<  170<H>  5.3   |  29  |  4.32<H>    Ca    7.9<L>      23 Apr 2020 08:15  Phos  7.2     04-23  Mg     2.2     04-23 HPI:  Patient is a 74 y.o F w/ PMH HLD, HTN, insulin dependent T2DM, RA (on chronic prednisone, leflunomide, and hydroxychloroquine) c/b bronchiectasis (on home O2 4 L NC) who presents with advanced CKD s/p initiation of iHD. Palliative consulted for GOC.      4/24/20 [LATE ENTRY]  - Spoke to Guan[place]ito, the patient's son earlier today  - He was understandably upset at the discharge plan for his mother. His bottomline is that: 1) He wants the patient discharged right away, 2) He wants home health aide services 24/7 to be implemented right away  - For the former, the patient's secondary insurance reportedly will pay for 24/7 HHA service according to [facility]  - However, for the latter, despite the efforts of hospice and case management, there is unfortunately no way for us to expedite starting home health aid services quickly. The process, if it will be indeed covered by the secondary insurance, will have to be approved.   - Our team SW has been coordinating diligently with [facility], but right now, we cannot fulfill [facility]'s requests for a 24/7 HHA  - A good compromise that we have offered is for the patient to be discharged to inpatient hospice first, while arranging the HHA situation, and if/when that is resolved, the patient could potentially be transferred home. This would afford the family the chance to visit the patient in the meantimes.  - This option has been presented to [facility] by [facility] and was unfortunately declined    =================================================================================================  ----- PERTINENT PMH/ SXH/ FHX  Hyperlipidemia  Bronchiectasis  Type 2 diabetes mellitus  Hypertension  Rheumatoid arthritis    No significant past surgical history    FAMILY HISTORY:  No pertinent family history in first degree relatives      ----- SOCIAL HISTORY:   [ ] Unable to elicit  Significant other/partner: Yes [X ]  No [ ] Children:  Yes [X ]  No [ ] Synagogue/Spirituality:  Substance hx: Yes[ ]  No [ ]   Tobacco hx:  Yes [ ] No [ ]   Alcohol hx: Yes [ ] No [ ]   Home Opioid hx:  [ ] I-Stop Reference No:  Living Situation: [ ]Home  [ ]Long term care  [ ]Rehab [ ]Other    ----- ADVANCE DIRECTIVES:    DNR  MOLST  [ ]  Living Will  [ ]   DECISION MAKER(s):  [ ] Health Care Proxy(s)  [ ] Surrogate(s)  [ ] Guardian           Name(s): Phone Number(s):    ----- BASELINE (I)ADL(s) (prior to admission):  [place]: [ ]Total  [ ] Moderate [ ]Dependent  Palliative Performance Status Version 2:            =================================================================================================  -----MEDICATIONS AND ALLERGIES:  Allergies    No Known Allergies    Intolerances    MEDICATIONS  (STANDING):  apixaban 5 milliGRAM(s) Oral two times a day  budesonide  80 MICROgram(s)/formoterol 4.5 MICROgram(s) Inhaler 2 Puff(s) Inhalation two times a day  ciprofloxacin     Tablet 250 milliGRAM(s) Oral two times a day  dextrose 5%. 1000 milliLiter(s) (50 mL/Hr) IV Continuous <Continuous>  dextrose 50% Injectable 12.5 Gram(s) IV Push once  dextrose 50% Injectable 12.5 Gram(s) IV Push once  folic acid 1 milliGRAM(s) Oral daily  furosemide    Tablet 80 milliGRAM(s) Oral two times a day  insulin glargine Injectable (LANTUS) 8 Unit(s) SubCutaneous at bedtime  insulin lispro (HumaLOG) corrective regimen sliding scale   SubCutaneous Before meals and at bedtime  metoprolol tartrate 25 milliGRAM(s) Oral two times a day  predniSONE   Tablet 15 milliGRAM(s) Oral daily  silver sulfADIAZINE 1% Cream 1 Application(s) Topical daily  sodium bicarbonate 650 milliGRAM(s) Oral three times a day  sodium zirconium cyclosilicate 10 Gram(s) Oral daily    MEDICATIONS  (PRN):  acetaminophen   Tablet .. 650 milliGRAM(s) Oral every 6 hours PRN Temp greater or equal to 38C (100.4F), Mild Pain (1 - 3), Moderate Pain (4 - 6)  ALBUTerol    90 MICROgram(s) HFA Inhaler 2 Puff(s) Inhalation every 6 hours PRN Shortness of Breath and/or Wheezing  dextrose 40% Gel 15 Gram(s) Oral once PRN Blood Glucose LESS THAN 70 milliGRAM(s)/deciliter  dextrose 40% Gel 15 Gram(s) Oral once PRN Blood Glucose LESS THAN 70 milliGRAM(s)/deciliter  glucagon  Injectable 1 milliGRAM(s) IntraMuscular once PRN Glucose LESS THAN 70 milligrams/deciliter  midodrine. 10 milliGRAM(s) Oral <User Schedule> PRN Pre-HD for Hypotension  ondansetron Injectable 4 milliGRAM(s) IV Push every 8 hours PRN Nausea and/or Vomiting  simethicone 80 milliGRAM(s) Chew two times a day PRN Upset Stomach        =================================================================================================  ----- SYMPTOM ASSESSMENT: [ ]Unable to obtain due to poor mentation   Source if other than patient:  [ ]Family   [ ]Team     Pain (Impact on QOL):    Location -   Severity -        Minimal acceptable level (0-10 scale):  Quality:   Onset:   Duration:                 Aggravating factors -  Relieving factors -  Radiation -    PAIN AD Score:     REVIEW OF SYSTEMS (not present if not checked off):  Unable to elicit [ ]  Dyspnea: [ ]  Anxiety: [ ]  Fatigue: [ ]  Nausea: [ ]                       Loss of appetite: [ ]  Constipation: [ ]  Grief: [ ]    Other Symptoms:  [ ]All other review of systems negative       =================================================================================================  ----- PHYSICAL EXAM:  Vital Signs Last 24 Hrs  T(C): 37 (24 Apr 2020 12:54), Max: 37.2 (23 Apr 2020 21:08)  T(F): 98.6 (24 Apr 2020 12:54), Max: 99 (24 Apr 2020 05:04)  HR: 81 (24 Apr 2020 12:54) (81 - 117)  BP: 112/67 (24 Apr 2020 12:54) (112/67 - 129/98)  BP(mean): --  RR: 16 (24 Apr 2020 12:54) (16 - 18)  SpO2: 100% (24 Apr 2020 12:54) (92% - 100%)      =================================================================================================  ----- LABS:  04-23    153<H>  |  107  |  122<H>  ----------------------------<  170<H>  5.3   |  29  |  4.32<H>    Ca    7.9<L>      23 Apr 2020 08:15  Phos  7.2     04-23  Mg     2.2     04-23

## 2020-04-24 NOTE — PROGRESS NOTE ADULT - REASON FOR ADMISSION
LE swelling
LE swelling/NBA
LE swelling

## 2020-04-24 NOTE — PROGRESS NOTE ADULT - PROBLEM SELECTOR PROBLEM 5
Acute on chronic diastolic (congestive) heart failure
E coli bacteremia
E coli bacteremia
Hypernatremia
Hypertension, unspecified type
Hypertension, unspecified type
Palliative care encounter
Rheumatoid arthritis
Rheumatoid arthritis
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 complication
Palliative care encounter

## 2020-04-24 NOTE — DISCHARGE NOTE PROVIDER - CARE PROVIDER_API CALL
Sylwia Wei)  Internal Medicine  [address], [address]  [place] [address]  Phone: [phone]  Fax: [phone]  Follow Up Time: 2 weeks

## 2020-04-24 NOTE — PROGRESS NOTE ADULT - PROBLEM SELECTOR PROBLEM 2
Acute on chronic diastolic (congestive) heart failure
Acute on chronic diastolic (congestive) heart failure
Acute renal failure, unspecified acute renal failure type
Acute renal failure, unspecified acute renal failure type
Chronic obstructive pulmonary disease, unspecified COPD type
E coli bacteremia
Hyperkalemia
Hyperkalemia
Renal failure, unspecified chronicity
Acute renal failure, unspecified acute renal failure type
Renal failure, unspecified chronicity
Renal failure, unspecified chronicity

## 2020-04-24 NOTE — PROGRESS NOTE ADULT - PROBLEM SELECTOR PROBLEM 1
Acute renal failure
Acute renal failure
Acute renal failure, unspecified acute renal failure type
Acute renal failure, unspecified acute renal failure type
E coli bacteremia
Hyperkalemia
Metabolic encephalopathy
Other encephalopathy
Hyperkalemia
Hyperkalemia
Other encephalopathy

## 2020-04-24 NOTE — PROGRESS NOTE ADULT - PROBLEM SELECTOR PROBLEM 3
Acidosis
Acute on chronic diastolic (congestive) heart failure
Acute on chronic diastolic (congestive) heart failure
Atrial fibrillation, unspecified type
Chronic obstructive pulmonary disease, unspecified COPD type
Longstanding persistent atrial fibrillation
Pancreatic mass
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 complication
Chronic obstructive pulmonary disease, unspecified COPD type

## 2020-04-24 NOTE — PROGRESS NOTE ADULT - SUBJECTIVE AND OBJECTIVE BOX
Medicine Progress Note    Patient is a 74y old  Female who presented initially  with a chief complaint of LE swelling (23 Apr 2020 11:44), secondary to NBA on CKD      SUBJECTIVE / OVERNIGHT EVENTS: no acute events, awake now that she is on BIPAP    ADDITIONAL REVIEW OF SYSTEMS:    MEDICATIONS  (STANDING):  apixaban 5 milliGRAM(s) Oral two times a day  budesonide  80 MICROgram(s)/formoterol 4.5 MICROgram(s) Inhaler 2 Puff(s) Inhalation two times a day  dextrose 5%. 1000 milliLiter(s) (50 mL/Hr) IV Continuous <Continuous>  dextrose 50% Injectable 12.5 Gram(s) IV Push once  dextrose 50% Injectable 12.5 Gram(s) IV Push once  folic acid 1 milliGRAM(s) Oral daily  furosemide    Tablet 80 milliGRAM(s) Oral two times a day  insulin glargine Injectable (LANTUS) 8 Unit(s) SubCutaneous at bedtime  insulin lispro (HumaLOG) corrective regimen sliding scale   SubCutaneous Before meals and at bedtime  metoprolol tartrate 50 milliGRAM(s) Oral two times a day  predniSONE   Tablet 15 milliGRAM(s) Oral daily  silver sulfADIAZINE 1% Cream 1 Application(s) Topical daily  sodium bicarbonate 650 milliGRAM(s) Oral three times a day    MEDICATIONS  (PRN):  acetaminophen   Tablet .. 650 milliGRAM(s) Oral every 6 hours PRN Temp greater or equal to 38C (100.4F), Mild Pain (1 - 3), Moderate Pain (4 - 6)  ALBUTerol    90 MICROgram(s) HFA Inhaler 2 Puff(s) Inhalation every 6 hours PRN Shortness of Breath and/or Wheezing  dextrose 40% Gel 15 Gram(s) Oral once PRN Blood Glucose LESS THAN 70 milliGRAM(s)/deciliter  dextrose 40% Gel 15 Gram(s) Oral once PRN Blood Glucose LESS THAN 70 milliGRAM(s)/deciliter  glucagon  Injectable 1 milliGRAM(s) IntraMuscular once PRN Glucose LESS THAN 70 milligrams/deciliter  midodrine. 10 milliGRAM(s) Oral <User Schedule> PRN Pre-HD for Hypotension  ondansetron Injectable 4 milliGRAM(s) IV Push every 8 hours PRN Nausea and/or Vomiting  simethicone 80 milliGRAM(s) Chew two times a day PRN Upset Stomach    CAPILLARY BLOOD GLUCOSE      POCT Blood Glucose.: 187 mg/dL (24 Apr 2020 12:16)  POCT Blood Glucose.: 179 mg/dL (24 Apr 2020 08:30)  POCT Blood Glucose.: 175 mg/dL (23 Apr 2020 21:25)  POCT Blood Glucose.: 222 mg/dL (23 Apr 2020 17:14)  POCT Blood Glucose.: 194 mg/dL (23 Apr 2020 12:40)    I&O's Summary    23 Apr 2020 07:01  -  24 Apr 2020 07:00  --------------------------------------------------------  IN: 275 mL / OUT: 375 mL / NET: -100 mL        PHYSICAL EXAM:  Vital Signs Last 24 Hrs  T(C): 37.2 (24 Apr 2020 05:04), Max: 37.2 (23 Apr 2020 21:08)  T(F): 99 (24 Apr 2020 05:04), Max: 99 (24 Apr 2020 05:04)  HR: 116 (24 Apr 2020 07:32) (80 - 117)  BP: 115/79 (24 Apr 2020 05:04) (113/82 - 129/98)-  RR: 18 (24 Apr 2020 05:04) (18 - 19)  SpO2: 93% (24 Apr 2020 07:32) (92% - 100%)  CONSTITUTIONAL: NAD, on BiPAP  RESPIRATORY: Normal respiratory effort; lungs are clear to auscultation bilaterally  CARDIOVASCULAR: Regular rate and rhythm, normal S1 and S2, no murmur/rub/gallop; +2-3 extremity edema;   ABDOMEN: Nontender to palpation, normoactive bowel sounds, no rebound/guarding;   PSYCH: A+O to person, place, and time; affect appropriate  NEUROLOGY: CN 2-12 are intact and symmetric; no gross sensory deficits       LABS:                        8.2    10.93 )-----------( 279      ( 23 Apr 2020 08:15 )             29.9     04-23    153<H>  |  107  |  122<H>  ----------------------------<  170<H>  5.3   |  29  |  4.32<H>    Ca    7.9<L>      23 Apr 2020 08:15  Phos  7.2     04-23  Mg     2.2     04-23                COVID-19 PCR: NotDetec (05 Apr 2020 02:56)      RADIOLOGY & ADDITIONAL TESTS:  Imaging from Last 24 Hours: none

## 2020-04-24 NOTE — PROGRESS NOTE ADULT - ASSESSMENT
Patient is a 74 y.o F w/ PMH HLD, HTN, insulin dependent T2DM, RA (on chronic prednisone, leflunomide, and hydroxychloroquine) c/b bronchiectasis (on home O2 4 L NC) who presents with advanced CKD s/p initiation of iHD. Palliative consulted for GOC.

## 2020-04-24 NOTE — DISCHARGE NOTE PROVIDER - NSDCCPCAREPLAN_GEN_ALL_CORE_FT
PRINCIPAL DISCHARGE DIAGNOSIS  Diagnosis: E coli bacteremia  Assessment and Plan of Treatment: s/P Zosyn, s/P Ceftriaxone, done with the antibiotics      SECONDARY DISCHARGE DIAGNOSES  Diagnosis: Atrial fibrillation  Assessment and Plan of Treatment: continue on metoprolol    Diagnosis: Carbon dioxide retention  Assessment and Plan of Treatment: continue on Bipap continue aT night

## 2020-04-24 NOTE — PROGRESS NOTE ADULT - PROBLEM SELECTOR PLAN 4
.  # Code: FULL  # HCP: Guanakito samaniego) @ [phone]    > No dialysis  > General plan is to discharge patient home with hospice  > Hospice meeting today  > Pending issues: HHA availability and hours + delivery of medical equipment    Due to visitation restrictions in the hospital in light of COVID pandemic, all discussions with the patient/ patient's healthcare proxy or surrogate have been done via telehealth. This is to prevent spread of infection within the hospital and out in the community. Total time discussing advance care planning, goals of care discussions, and discussions about code status and hospice = 30 mins .  # Code: DNR, DNI, NO HD  # HCP: Guanakito (son) @ [phone]    4/24  > As above, very difficult situation as the son wants the patient home understandably. We want to be able to provide as much support as possible for the family, but given the situation, hospice cannot initiate 24/7 HHA support currently  > We are trying to find a compromise with family: possibly to transfer the patient to inptient hospice first while the HHA issue is being sorted out. This is so family can visit in the meantime. The pt can be transferred home later on, if stable, and once the HHA issue has been resolved.  > Guanakito has been declining this option for now, but this cn always be revisited.  > We will continue to work with case management and [facility] to hopefully follow through with Guanakito's requests    Due to visitation restrictions in the hospital in light of COVID pandemic, all discussions with the patient/ patient's healthcare proxy or surrogate have been done via telehealth. This is to prevent spread of infection within the hospital and out in the community. Total time discussing advance care planning, goals of care discussions, and discussions about code status and hospice = 16 mins

## 2020-04-24 NOTE — DISCHARGE NOTE NURSING/CASE MANAGEMENT/SOCIAL WORK - PATIENT PORTAL LINK FT
You can access the FollowMyHealth Patient Portal offered by [facility] by registering at the following website: [email or web address]. By joining [facility]’s FollowMyHealth portal, you will also be able to view your health information using other applications (apps) compatible with our system.

## 2020-04-24 NOTE — PROGRESS NOTE ADULT - PROBLEM SELECTOR PLAN 5
-Continue Cetriaxone for now  -Repeat BCX is negative so far  -Per ID, will likely need Cipro to finish course as outpatient  -CT of abdomen/pelvis shows no active source
-Continue Zosyn  -Will repeat BCX to ensure clearance  -CT of abdomen/pelvis shows no active source
FS in 200s
Home medications include prednisone 20 mg daily, leflunomide 20 daily, and hydroxychloroquine 200 BID  -c/w prednisone 20 daily  -Hold hydroxychloroquine and leflunamide for now
Home medications include prednisone 20 mg daily, leflunomide 20 daily, and hydroxychloroquine 200 BID -- joints appear quiescent, concern that steroids contributing to fluid overload  - prednisone taper to 17.5mg daily   - resume hydroxychloroquine   - hold leflunamide
ISS
ISS
Thank you for allowing us to participate in your patient's care. We will continue to follow with you. Please page [phone] for any q's or c's.     German Doyle  Palliative Medicine
controlled
controlled, bp slightly low this am
not well controlled would slightly increase her insulin for slightly better control and get FS under 200.
not well controlled would slightly increase her insulin for slightly better control and get FS under 200.
rising, was 149 yesterday and today 153, however she is total body volume overloaded, to discuss with Renal, increasing the diuresis may mobilize her extravascular volume and actually improve, however to discuss with them
seen by cardiol , stable no further eval, not on anticoag. Hx of afib with chads 4
Thank you for allowing us to participate in your patient's care. We will continue to follow with you. Please page [phone] for any q's or c's.     German Doyle  Palliative Medicine

## 2020-04-24 NOTE — PROGRESS NOTE ADULT - ASSESSMENT
74 year old woman with COPD/bronchiectasis and CO2 retention, on BiPAP, CHF, declining renal function, more awake and alert for home hospice care

## 2020-04-24 NOTE — PROGRESS NOTE ADULT - PROBLEM SELECTOR PLAN 3
-FS ok  -Continue Lantus and HISS for now
-FS ok  -Continue Lantus and HISS for now
-S/p Lasix 40 mg IV   -S/p Bumex 1 mg   -F/u on TTE
-S/p Lasix 40 mg IV   -S/p Bumex 1 mg   -F/u on TTE
.  > Nocturnal BIPAP
HR improved, on 50 bid metoprolol would increase to 75 bid
Patient with a primarily respiratory acidosis in the setting of DHEERAJ. Metabolic component as well secondary to renal failure. Agree with Bipap at night. Started on Sodium bicarbonate 650 mg TID. Monitor serum bicarbonate.    If any questions, please feel free to contact me  Oliver Ricci   Nephrology Fellow  [phone]  (After 5 pm or on weekends please page the on-call fellow)
Spoke with the family, this is not new and no further evaluation is needed
better controlled since starting on low dose Lantus( 5)
will need GI eval as patient not competent to give consent or not  and family cannot be reached
with RVR over the last 24 hours, will increase the metoprolol to 50 bid
.  > Nocturnal BIPAP

## 2020-04-24 NOTE — PROGRESS NOTE ADULT - PROBLEM SELECTOR PROBLEM 4
Acute renal failure, unspecified acute renal failure type
Advance care planning
E coli bacteremia
Hyperkalemia
Rheumatoid arthritis of hip, unspecified laterality, unspecified rheumatoid factor presence
Rheumatoid arthritis of hip, unspecified laterality, unspecified rheumatoid factor presence
SIRS (systemic inflammatory response syndrome)
SIRS (systemic inflammatory response syndrome)
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 complication
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 complication
Advance care planning

## 2020-04-24 NOTE — DISCHARGE NOTE PROVIDER - HOSPITAL COURSE
74 year old woman with COPD/bronchiectasis and CO2 retention, on BiPAP, CHF, declining renal function, more awake and alert for home hospice care         encephalopathy.  Plan: resolved with BiPAP secondary to CO2 retention from her lung disease.     Renal failure, unspecified chronicity.  Plan: slowly worsening, no HD and family and patient was clear about not wanting this.     Atrial fibrillation, unspecified type.  Plan: HR improved, on 50 bid metoprolol would increase to 75 bid.     Pancreatic mass.  Plan: old, no further w/u needed.     Patient's condition is poor.  Patient is going home with home hospice.        Patient is hemodynamically stable and without complaints and patient is ready for discharge.  Case discussed and medications reviewed with patient and PMD.  Agreed with above.  Medications needed sent to pharmacy.

## 2020-04-24 NOTE — GOALS OF CARE CONVERSATION - ADVANCED CARE PLANNING - CONVERSATION DETAILS
[facility]:    Received pc from son, informing that he was still having trouble making arrangements for 1 of the ([facility]-par) agencies he's contacted to agree to receive payment for the hospice-cov'd 20hrs/wk from [facility], & then bill whatever extra hours son arranges for to [facility].       Called [employer] Head of Hospice Tim, Elizabeth Nieto @ [phone].  Got her VM, requested urgent c/b to help getting this issue resolved.      When didn't hear back after ~1hr, called [facility] back.  Was directed to their HC Advocacy Prog, s/w nurse, Cris.  She now informed that some [facility] pt's do have the type of benefit son had been told about...  but that this pt has [facility] Secondary/ M'care as Primary, so her cov'age goes strictly by M'care rules.              Related to Cris that I could understand if son had mis-apprehended ins cov'age information...  but that both I, as well as  HCN , had heard BC reps relating the SAME MIS-information to us, on separate pc's -- even knowing the right specific questions to ask, so as not to get just general, "...yes, pt can be covered if..." type info.  Further, since every rep asks for pt's policy # &  before starting a conversation, how could we each also have been given the information for ANOTHER type of policy???        Now however, Cris is saying that pt's hospice benefit is the same as always for M'care pts....  what I had told son back on Wed.  But on Wed...  & ever since...  son kept saying that he had been told by [facility] that pt DID have a benefit for fully paid for extra HHA hours.   And he was determined that his mother should receive all the cov'age he thought she was contracted for (& that he feels she needs).   He has been delaying pt's d/c home for past 2 days in effort to arrange for it.     Told Cris that BC had given son - and myself & [facility] Billing - wrong information...  & that has resulted in 2-days of fruitless pc's + delayed d/c for a near-terminal pt.   She offered no apology, no complaint-registration mechanism.       Called son who, as would be expected, was furious, kept insisting what BC had told him was true - what I was telling him was not. Told him I very well understood his anger, but that for his & his father's sake (pt's luly is disabled, w/c-bound at home), we have to try to get his mother d/c'd today.       Again offered that pt could be transf'd to [facility] (where tho limited, family visiting is being allowed during this Covid crisis)  but son declined, wants her home.  He agreed to try to arrange for HHA weekend cov'age from par agency (cov'd by [facility]), & says he will stay at parents home over the weekend as well, to assist pt.  But will refuse to samanta pay for any addt'l HHA hrs... as he still wishes to fight that out w BC.     Called KEVIN Jensen.  She brought MD to phone, who informs that she now has pt on BiPap ATC.  Camila will arrange for [facility] to transport pt home on BiPap.  ([facility] Resp Therapist already delivered Bipap w MD-ord'd settings to pt's home Wed evening.  Son informed Therapist demonstrated usage to him.)       [facility] Wilder Renteria Mgr aware that evening home visit by RN will be needed.
[facility]:  Called son/PCG to discuss having pt go home w home hospice svces.  He is in agreement w d/c plan.  Emailed [facility] consents to him ([email or web address]).  We reviewed consents; son initialed & signed where indicated, then faxed documents to [facility].       Ord'd Bed/ELÍAS & 10L O2 Concentrator for delivery to pt's home by [facility].  JOE Mares completed form that [facility] requires for ordering BiPap & faxed it to [facility].  BiPap will be delivered to the home by Resp Therapist once pt returns home; therapist will instruct family in its use at home.  Will f/u on d/c plan tomorrow.
[facility]:  DME has been delivered to pt's home.  Son cannot take pt home until he arranges 24/7 HHA.  Son informed he found out that pt's BC Supplemental ins will pay for HHA indep of what M'care will cover under hospice (4H x 5D).   Have never found this to be true w supplemental policies, so doubted this.  Son later patched me into a conversation he was having w BC, & they confirmed that this was true... said that, per this pt's policy, they do not follow M'care guidelines for hospice pt.       He made calls to multiple agencies on [facility] samanta hire list, but these agencies won't set up an aide until they can be assured of getting paid by pt's BC.  Got approval for pt to go to [facility], if pt's current BiPap requirements can be tapered,  But son wants pt at home.  No resolution by end of day.
[facility] - Consents Emailed to son.
Talked extensively with father, and son. Explained situation with mother, as mother renal function has not improved, and she is not a candidate for HD. Also explained that mother does not wish for HD, and has asked about comfort care and is interested in palliative consult. Explained pt given her various comorbidities has poor prognosis (advanced CKD, bronchiectasis). Told them we will clarify goals for her, and that palliative care will help with these decisions.

## 2020-09-10 NOTE — DIETITIAN INITIAL EVALUATION ADULT. - NUTRITION DIAGNOSITC TERMINOLOGY #1
Spoke with patient and made an appointment with Dr. Gross in the [place] office. Gave patient the date,time and address. Verbalized understanding.    ----- Message from Elinor Low sent at 9/10/2020  3:38 PM CDT -----   Name of Who is Calling:     What is the request in detail: patient request call back in reference to  when should schedule appointment // patient state have bad back if do not   please leave a message Please contact to further discuss and advise      Can the clinic reply by [facility]: no     What Number to Call Back if not in [facility]: [phone]        Altered Nutrition Related Lab Values

## 2022-04-11 NOTE — PROGRESS NOTE ADULT - SUBJECTIVE AND OBJECTIVE BOX
CC: F/U for Bacteremia    Saw/spoke to patient. Patient more fatigued today. Less interactive.    Allergies  No Known Allergies    ANTIMICROBIALS:  cefTRIAXone   IVPB 2000 every 24 hours    PE:    Vital Signs Last 24 Hrs  T(C): 36.3 (17 Apr 2020 05:34), Max: 36.8 (16 Apr 2020 22:53)  T(F): 97.4 (17 Apr 2020 05:34), Max: 98.2 (16 Apr 2020 22:53)  HR: 102 (17 Apr 2020 05:34) (90 - 102)  BP: 108/50 (17 Apr 2020 05:34) (108/50 - 139/59)  RR: 17 (17 Apr 2020 05:34) (16 - 17)  SpO2: 99% (17 Apr 2020 05:34) (95% - 100%)    Gen: AOx3, NAD, non-toxic  CV: S1+S2 normal, tachycardic  Resp: Clear bilat, no resp distress, no crackles/wheezes  Abd: Soft, nontender, +BS  Ext: No LE edema, no wounds    LABS:                        7.8    12.12 )-----------( 185      ( 17 Apr 2020 05:54 )             27.5     04-17    141  |  100  |  88<H>  ----------------------------<  217<H>  5.7<H>   |  25  |  3.41<H>    Ca    8.6      17 Apr 2020 05:54  Mg     2.2     04-17    TPro  5.7<L>  /  Alb  2.0<L>  /  TBili  0.2  /  DBili  x   /  AST  10  /  ALT  17  /  AlkPhos  160<H>  04-17    MICROBIOLOGY:    .Blood Blood-Peripheral aerobic  04-12-20   No growth to date.    .Blood Blood-Venous  04-11-20   No Growth Final    .Blood Blood  04-08-20   Growth in aerobic bottle: Escherichia coli  See previous culture [id number]  --    Growth in aerobic bottle: Gram Negative Rods    (otherwise reviewed)    RADIOLOGY:    4/10 XR:     FINDINGS:      Technically very limited study. Made and upper abdomen is only partially included within the field-of-view. No abnormal bowel distention.    IMPRESSION:     Very limited study demonstrates no abnormal bowel distention. 10-Apr-2022 20:15

## 2023-10-02 NOTE — PROGRESS NOTE ADULT - ASSESSMENT
"DATE: 10/10/2023  PATIENT: Brandi Parks    Attending Physician: Blayne Owens M.D.    HISTORY:  Brandi Parks is a 49 y.o. female who returns to me today for MRI results.  She was last seen by me 9/27/2023.  Today she is doing well but notes low back and left posterior leg pain (Back - 1, Leg - 5).  The Patient denies myelopathic symptoms such as handwriting changes or difficulty with buttons/coins/keys. Denies perineal paresthesias, bowel/bladder dysfunction.    PMH/PSH/FamHx/SocHx:  Unchanged from prior visit    ROS:  REVIEW OF SYSTEMS:  Constitution: Negative. Negative for chills, fever and night sweats.   HENT: Negative for congestion and headaches.    Eyes: Negative for blurred vision, left vision loss and right vision loss.   Cardiovascular: Negative for chest pain and syncope.   Respiratory: Negative for cough and shortness of breath.    Endocrine: Negative for polydipsia, polyphagia and polyuria.   Hematologic/Lymphatic: Negative for bleeding problem. Does not bruise/bleed easily.   Skin: Negative for dry skin, itching and rash.   Musculoskeletal: Negative for falls and muscle weakness.   Gastrointestinal: Negative for abdominal pain and bowel incontinence.   Allergic/Immunologic: Negative for hives and persistent infections.  Genitourinary: Negative for urinary retention/incontinence and nocturia.   Neurological: negative for disturbances in coordination, no myelopathic symptoms such as handwriting changes or difficulty with buttons, coins, keys or small objects. No loss of balance and seizures.   Psychiatric/Behavioral: Negative for depression. The patient does not have insomnia.   Denies perineal paresthesias, bowel or bladder incontinence    EXAM:  Ht 5' 7" (1.702 m)   Wt 79.5 kg (175 lb 2.5 oz)   LMP 09/13/2023   BMI 27.43 kg/m²   Stable.     IMAGING:    Today I personally re- reviewed AP, Lat and Flex/Ex  upright L-spine that demonstrate mild DDD at L5/S1.      Lumbar MRI shows left " "sided disc bulge at L5/S1.    Body mass index is 27.43 kg/m².    No results found for: "HGBA1C"      ASSESSMENT/PLAN:    Summer was seen today for low-back pain.    Diagnoses and all orders for this visit:    Lumbar radiculopathy  -     Procedure Order to Pain Management; Future      Left TF LUISA ordered, she may follow up 2 weeks after if her pain persists, I will discuss surgery at that time.       " Patient is a 73 yo woma with HLD, HTN, insulin dependent T2DM, RA (on chronic prednisone, leflunomide, and hydroxychloroquine) c/b bronchiectasis (on home O2 4 L NC) who p/w worsening LE swelling and was noted to be in atrial fibrillation.    Elevated troponins, in setting of NBA on HD, type II MI  - In the setting sepsis (E coli bacteremia) and new pancreatic tail cystic lesion, Blood ctx 4/12/20 NGTD  - Low suspicion for ACS, given clinical context and with underlying NBA now on HD with negative CK  - TTE with grossly normal left ventricular systolic function  - Last stress test less than a year in Dr. Mays's office was reportedly unremarkable   - ECG NSR with non ST specific changes   - Aspirin held    Atrial fibrillation  - NAHOMI-VASc score 4   - Rate better controlled, not on medications at this time  - s/p blood transfusions. Not on anticoagulants at this time  - Please call with questions. Spectra [id number].  Thank you.

## 2025-04-08 NOTE — ED ADULT TRIAGE NOTE - PRO INTERPRETER NEED 2
HR=75 bpm, KQIS=945/76 mmhg, QnS7=626.0 %, Resp=8 B/min, EtCO2=38 mmHg, Apnea=3 Seconds, Comment=SR English